# Patient Record
Sex: FEMALE | Race: WHITE | NOT HISPANIC OR LATINO | Employment: FULL TIME | URBAN - METROPOLITAN AREA
[De-identification: names, ages, dates, MRNs, and addresses within clinical notes are randomized per-mention and may not be internally consistent; named-entity substitution may affect disease eponyms.]

---

## 2017-05-30 ENCOUNTER — TRANSCRIBE ORDERS (OUTPATIENT)
Dept: LAB | Facility: CLINIC | Age: 72
End: 2017-05-30

## 2017-05-30 ENCOUNTER — GENERIC CONVERSION - ENCOUNTER (OUTPATIENT)
Dept: OTHER | Facility: OTHER | Age: 72
End: 2017-05-30

## 2017-05-30 ENCOUNTER — APPOINTMENT (OUTPATIENT)
Dept: LAB | Facility: CLINIC | Age: 72
End: 2017-05-30
Payer: MEDICARE

## 2017-05-30 DIAGNOSIS — F32.A VASCULAR DEMENTIA WITH DEPRESSED MOOD (HCC): ICD-10-CM

## 2017-05-30 DIAGNOSIS — I10 ESSENTIAL HYPERTENSION, MALIGNANT: ICD-10-CM

## 2017-05-30 DIAGNOSIS — E78.5 OTHER AND UNSPECIFIED HYPERLIPIDEMIA: ICD-10-CM

## 2017-05-30 DIAGNOSIS — I10 ESSENTIAL HYPERTENSION, MALIGNANT: Primary | ICD-10-CM

## 2017-05-30 DIAGNOSIS — E78.5 HYPERLIPIDEMIA: ICD-10-CM

## 2017-05-30 DIAGNOSIS — F32.9 MAJOR DEPRESSIVE DISORDER, SINGLE EPISODE: ICD-10-CM

## 2017-05-30 DIAGNOSIS — F01.50 VASCULAR DEMENTIA WITH DEPRESSED MOOD (HCC): ICD-10-CM

## 2017-05-30 DIAGNOSIS — E55.9 UNSPECIFIED VITAMIN D DEFICIENCY: ICD-10-CM

## 2017-05-30 DIAGNOSIS — E55.9 VITAMIN D DEFICIENCY: ICD-10-CM

## 2017-05-30 DIAGNOSIS — I10 ESSENTIAL (PRIMARY) HYPERTENSION: ICD-10-CM

## 2017-05-30 LAB
25(OH)D3 SERPL-MCNC: 24 NG/ML (ref 30–100)
ALBUMIN SERPL BCP-MCNC: 3.6 G/DL (ref 3.5–5)
ALP SERPL-CCNC: 44 U/L (ref 46–116)
ALT SERPL W P-5'-P-CCNC: 20 U/L (ref 12–78)
ANION GAP SERPL CALCULATED.3IONS-SCNC: 5 MMOL/L (ref 4–13)
AST SERPL W P-5'-P-CCNC: 20 U/L (ref 5–45)
BASOPHILS # BLD AUTO: 0.04 THOUSANDS/ΜL (ref 0–0.1)
BASOPHILS NFR BLD AUTO: 1 % (ref 0–1)
BILIRUB SERPL-MCNC: 0.4 MG/DL (ref 0.2–1)
BUN SERPL-MCNC: 16 MG/DL (ref 5–25)
CALCIUM SERPL-MCNC: 9 MG/DL (ref 8.3–10.1)
CHLORIDE SERPL-SCNC: 101 MMOL/L (ref 100–108)
CHOLEST SERPL-MCNC: 202 MG/DL (ref 50–200)
CO2 SERPL-SCNC: 32 MMOL/L (ref 21–32)
CREAT SERPL-MCNC: 0.79 MG/DL (ref 0.6–1.3)
EOSINOPHIL # BLD AUTO: 0.17 THOUSAND/ΜL (ref 0–0.61)
EOSINOPHIL NFR BLD AUTO: 3 % (ref 0–6)
ERYTHROCYTE [DISTWIDTH] IN BLOOD BY AUTOMATED COUNT: 12.4 % (ref 11.6–15.1)
GFR SERPL CREATININE-BSD FRML MDRD: >60 ML/MIN/1.73SQ M
GLUCOSE P FAST SERPL-MCNC: 96 MG/DL (ref 65–99)
HCT VFR BLD AUTO: 37.6 % (ref 34.8–46.1)
HDLC SERPL-MCNC: 64 MG/DL (ref 40–60)
HGB BLD-MCNC: 12.1 G/DL (ref 11.5–15.4)
LDLC SERPL CALC-MCNC: 118 MG/DL (ref 0–100)
LYMPHOCYTES # BLD AUTO: 1.85 THOUSANDS/ΜL (ref 0.6–4.47)
LYMPHOCYTES NFR BLD AUTO: 33 % (ref 14–44)
MCH RBC QN AUTO: 31.3 PG (ref 26.8–34.3)
MCHC RBC AUTO-ENTMCNC: 32.2 G/DL (ref 31.4–37.4)
MCV RBC AUTO: 97 FL (ref 82–98)
MONOCYTES # BLD AUTO: 0.49 THOUSAND/ΜL (ref 0.17–1.22)
MONOCYTES NFR BLD AUTO: 9 % (ref 4–12)
NEUTROPHILS # BLD AUTO: 3.09 THOUSANDS/ΜL (ref 1.85–7.62)
NEUTS SEG NFR BLD AUTO: 54 % (ref 43–75)
PLATELET # BLD AUTO: 256 THOUSANDS/UL (ref 149–390)
PMV BLD AUTO: 9.5 FL (ref 8.9–12.7)
POTASSIUM SERPL-SCNC: 4.1 MMOL/L (ref 3.5–5.3)
PROT SERPL-MCNC: 6.9 G/DL (ref 6.4–8.2)
RBC # BLD AUTO: 3.86 MILLION/UL (ref 3.81–5.12)
SODIUM SERPL-SCNC: 138 MMOL/L (ref 136–145)
TRIGL SERPL-MCNC: 98 MG/DL
TSH SERPL DL<=0.05 MIU/L-ACNC: 0.82 UIU/ML (ref 0.36–3.74)
WBC # BLD AUTO: 5.64 THOUSAND/UL (ref 4.31–10.16)

## 2017-05-30 PROCEDURE — 82306 VITAMIN D 25 HYDROXY: CPT

## 2017-05-30 PROCEDURE — 36415 COLL VENOUS BLD VENIPUNCTURE: CPT

## 2017-05-30 PROCEDURE — 80061 LIPID PANEL: CPT

## 2017-05-30 PROCEDURE — 84443 ASSAY THYROID STIM HORMONE: CPT

## 2017-05-30 PROCEDURE — 85025 COMPLETE CBC W/AUTO DIFF WBC: CPT

## 2017-05-30 PROCEDURE — 80053 COMPREHEN METABOLIC PANEL: CPT

## 2017-05-31 ENCOUNTER — GENERIC CONVERSION - ENCOUNTER (OUTPATIENT)
Dept: OTHER | Facility: OTHER | Age: 72
End: 2017-05-31

## 2017-06-05 ENCOUNTER — ALLSCRIPTS OFFICE VISIT (OUTPATIENT)
Dept: OTHER | Facility: OTHER | Age: 72
End: 2017-06-05

## 2017-07-06 ENCOUNTER — GENERIC CONVERSION - ENCOUNTER (OUTPATIENT)
Dept: OTHER | Facility: OTHER | Age: 72
End: 2017-07-06

## 2017-12-04 ENCOUNTER — TRANSCRIBE ORDERS (OUTPATIENT)
Dept: LAB | Facility: CLINIC | Age: 72
End: 2017-12-04

## 2017-12-04 ENCOUNTER — GENERIC CONVERSION - ENCOUNTER (OUTPATIENT)
Dept: OTHER | Facility: OTHER | Age: 72
End: 2017-12-04

## 2017-12-04 ENCOUNTER — APPOINTMENT (OUTPATIENT)
Dept: LAB | Facility: CLINIC | Age: 72
End: 2017-12-04
Payer: MEDICARE

## 2017-12-04 DIAGNOSIS — F32.A VASCULAR DEMENTIA WITH DEPRESSED MOOD (HCC): ICD-10-CM

## 2017-12-04 DIAGNOSIS — M15.9 GENERALIZED OSTEOARTHROSIS, INVOLVING MULTIPLE SITES: ICD-10-CM

## 2017-12-04 DIAGNOSIS — E55.9 AVITAMINOSIS D: ICD-10-CM

## 2017-12-04 DIAGNOSIS — F32.9 MAJOR DEPRESSIVE DISORDER, SINGLE EPISODE: ICD-10-CM

## 2017-12-04 DIAGNOSIS — M15.9 POLYOSTEOARTHRITIS: ICD-10-CM

## 2017-12-04 DIAGNOSIS — F01.50 VASCULAR DEMENTIA WITH DEPRESSED MOOD (HCC): ICD-10-CM

## 2017-12-04 DIAGNOSIS — I10 ESSENTIAL (PRIMARY) HYPERTENSION: ICD-10-CM

## 2017-12-04 DIAGNOSIS — E78.5 HYPERLIPIDEMIA: ICD-10-CM

## 2017-12-04 DIAGNOSIS — I10 ESSENTIAL HYPERTENSION, MALIGNANT: ICD-10-CM

## 2017-12-04 DIAGNOSIS — E55.9 VITAMIN D DEFICIENCY: ICD-10-CM

## 2017-12-04 DIAGNOSIS — E78.5 HYPERLIPIDEMIA, UNSPECIFIED HYPERLIPIDEMIA TYPE: ICD-10-CM

## 2017-12-04 DIAGNOSIS — F01.50 VASCULAR DEMENTIA WITH DEPRESSED MOOD (HCC): Primary | ICD-10-CM

## 2017-12-04 DIAGNOSIS — F32.A VASCULAR DEMENTIA WITH DEPRESSED MOOD (HCC): Primary | ICD-10-CM

## 2017-12-04 LAB
25(OH)D3 SERPL-MCNC: 31.5 NG/ML (ref 30–100)
ALBUMIN SERPL BCP-MCNC: 3.6 G/DL (ref 3.5–5)
ALP SERPL-CCNC: 45 U/L (ref 46–116)
ALT SERPL W P-5'-P-CCNC: 22 U/L (ref 12–78)
ANION GAP SERPL CALCULATED.3IONS-SCNC: 8 MMOL/L (ref 4–13)
AST SERPL W P-5'-P-CCNC: 17 U/L (ref 5–45)
BASOPHILS # BLD AUTO: 0.02 THOUSANDS/ΜL (ref 0–0.1)
BASOPHILS NFR BLD AUTO: 0 % (ref 0–1)
BILIRUB SERPL-MCNC: 0.5 MG/DL (ref 0.2–1)
BUN SERPL-MCNC: 18 MG/DL (ref 5–25)
CALCIUM SERPL-MCNC: 8.9 MG/DL (ref 8.3–10.1)
CHLORIDE SERPL-SCNC: 102 MMOL/L (ref 100–108)
CHOLEST SERPL-MCNC: 213 MG/DL (ref 50–200)
CO2 SERPL-SCNC: 30 MMOL/L (ref 21–32)
CREAT SERPL-MCNC: 0.93 MG/DL (ref 0.6–1.3)
EOSINOPHIL # BLD AUTO: 0.1 THOUSAND/ΜL (ref 0–0.61)
EOSINOPHIL NFR BLD AUTO: 1 % (ref 0–6)
ERYTHROCYTE [DISTWIDTH] IN BLOOD BY AUTOMATED COUNT: 12 % (ref 11.6–15.1)
GFR SERPL CREATININE-BSD FRML MDRD: 62 ML/MIN/1.73SQ M
GLUCOSE P FAST SERPL-MCNC: 89 MG/DL (ref 65–99)
HCT VFR BLD AUTO: 37.4 % (ref 34.8–46.1)
HDLC SERPL-MCNC: 71 MG/DL (ref 40–60)
HGB BLD-MCNC: 12.1 G/DL (ref 11.5–15.4)
LDLC SERPL CALC-MCNC: 131 MG/DL (ref 0–100)
LYMPHOCYTES # BLD AUTO: 2.15 THOUSANDS/ΜL (ref 0.6–4.47)
LYMPHOCYTES NFR BLD AUTO: 27 % (ref 14–44)
MCH RBC QN AUTO: 31.5 PG (ref 26.8–34.3)
MCHC RBC AUTO-ENTMCNC: 32.4 G/DL (ref 31.4–37.4)
MCV RBC AUTO: 97 FL (ref 82–98)
MONOCYTES # BLD AUTO: 0.58 THOUSAND/ΜL (ref 0.17–1.22)
MONOCYTES NFR BLD AUTO: 7 % (ref 4–12)
NEUTROPHILS # BLD AUTO: 5.22 THOUSANDS/ΜL (ref 1.85–7.62)
NEUTS SEG NFR BLD AUTO: 65 % (ref 43–75)
PLATELET # BLD AUTO: 251 THOUSANDS/UL (ref 149–390)
PMV BLD AUTO: 9.6 FL (ref 8.9–12.7)
POTASSIUM SERPL-SCNC: 3.8 MMOL/L (ref 3.5–5.3)
PROT SERPL-MCNC: 7.1 G/DL (ref 6.4–8.2)
RBC # BLD AUTO: 3.84 MILLION/UL (ref 3.81–5.12)
SODIUM SERPL-SCNC: 140 MMOL/L (ref 136–145)
TRIGL SERPL-MCNC: 55 MG/DL
TSH SERPL DL<=0.05 MIU/L-ACNC: 1.22 UIU/ML (ref 0.36–3.74)
WBC # BLD AUTO: 8.07 THOUSAND/UL (ref 4.31–10.16)

## 2017-12-04 PROCEDURE — 85025 COMPLETE CBC W/AUTO DIFF WBC: CPT

## 2017-12-04 PROCEDURE — 80061 LIPID PANEL: CPT

## 2017-12-04 PROCEDURE — 36415 COLL VENOUS BLD VENIPUNCTURE: CPT

## 2017-12-04 PROCEDURE — 80053 COMPREHEN METABOLIC PANEL: CPT

## 2017-12-04 PROCEDURE — 84443 ASSAY THYROID STIM HORMONE: CPT

## 2017-12-04 PROCEDURE — 82306 VITAMIN D 25 HYDROXY: CPT

## 2017-12-05 ENCOUNTER — GENERIC CONVERSION - ENCOUNTER (OUTPATIENT)
Dept: OTHER | Facility: OTHER | Age: 72
End: 2017-12-05

## 2017-12-05 DIAGNOSIS — M15.9 POLYOSTEOARTHRITIS: ICD-10-CM

## 2017-12-05 DIAGNOSIS — I10 ESSENTIAL (PRIMARY) HYPERTENSION: ICD-10-CM

## 2017-12-05 DIAGNOSIS — F32.9 MAJOR DEPRESSIVE DISORDER, SINGLE EPISODE: ICD-10-CM

## 2017-12-05 DIAGNOSIS — E78.5 HYPERLIPIDEMIA: ICD-10-CM

## 2017-12-05 DIAGNOSIS — E55.9 VITAMIN D DEFICIENCY: ICD-10-CM

## 2017-12-12 ENCOUNTER — ALLSCRIPTS OFFICE VISIT (OUTPATIENT)
Dept: OTHER | Facility: OTHER | Age: 72
End: 2017-12-12

## 2018-01-10 NOTE — RESULT NOTES
Verified Results  (1) VITAMIN D 25-HYDROXY [id number] 09:01AM Gamaliel Figueredo     Test Name Result Flag Reference   VIT D 25-HYDROX 27 7 ng/mL L 30 0-100 0   This assay is a certified procedure of the CDC Vitamin D Standardization Certification Program (VDSCP)     Deficiency <20ng/ml   Insufficiency 20-30ng/ml   Sufficient  ng/ml     *Patients undergoing fluorescein dye angiography may retain small amounts of fluorescein in the body for 48-72 hours post procedure  Samples containing fluorescein can produce falsely elevated Vitamin D values  If the patient had this procedure, a specimen should be resubmitted post fluorescein clearance

## 2018-01-10 NOTE — RESULT NOTES
Verified Results  (1) VITAMIN D 25-HYDROXY [id number] 10:30AM Shama Ramirez     Test Name Result Flag Reference   VIT D 25-HYDROX 24 0 ng/mL L 30 0-100 0   This assay is a certified procedure of the CDC Vitamin D Standardization Certification Program (VDSCP)     Deficiency <20ng/ml   Insufficiency 20-30ng/ml   Sufficient  ng/ml     *Patients undergoing fluorescein dye angiography may retain small amounts of fluorescein in the body for 48-72 hours post procedure  Samples containing fluorescein can produce falsely elevated Vitamin D values  If the patient had this procedure, a specimen should be resubmitted post fluorescein clearance

## 2018-01-11 NOTE — RESULT NOTES
Verified Results  (1) CBC/PLT/DIFF 31Oct2016 09:01AM Nathen Russell Order Number: [id number]     Test Name Result Flag Reference   WBC COUNT 6 04 Thousand/uL  4 31-10 16   RBC COUNT 3 90 Million/uL  3 81-5 12   HEMOGLOBIN 12 3 g/dL  11 5-15 4   HEMATOCRIT 37 4 %  34 8-46  1   MCV 96 fL  82-98   MCH 31 5 pg  26 8-34 3   MCHC 32 9 g/dL  31 4-37 4   RDW 12 3 %  11 6-15 1   MPV 9 5 fL  8 9-12 7   PLATELET COUNT 261 Thousands/uL  149-390   NEUTROPHILS RELATIVE PERCENT 50 %  43-75   LYMPHOCYTES RELATIVE PERCENT 37 %  14-44   MONOCYTES RELATIVE PERCENT 10 %  4-12   EOSINOPHILS RELATIVE PERCENT 2 %  0-6   BASOPHILS RELATIVE PERCENT 1 %  0-1   NEUTROPHILS ABSOLUTE COUNT 3 05 Thousands/?L  1 85-7 62   LYMPHOCYTES ABSOLUTE COUNT 2 24 Thousands/?L  0 60-4 47   MONOCYTES ABSOLUTE COUNT 0 61 Thousand/?L  0 17-1 22   EOSINOPHILS ABSOLUTE COUNT 0 11 Thousand/?L  0 00-0 61   BASOPHILS ABSOLUTE COUNT 0 03 Thousands/?L  0 00-0 10     (1) COMPREHENSIVE METABOLIC PANEL [id number] 08:86CW [place] [facility]    Order Number: [id number]     Test Name Result Flag Reference   GLUCOSE,RANDM 97 mg/dL     If the patient is fasting, the ADA then defines impaired fasting glucose as > 100 mg/dL and diabetes as > or equal to 123 mg/dL     SODIUM 141 mmol/L  136-145   POTASSIUM 4 0 mmol/L  3 5-5 3   CHLORIDE 103 mmol/L  100-108   CARBON DIOXIDE 31 mmol/L  21-32   ANION GAP (CALC) 7 mmol/L  4-13   BLOOD UREA NITROGEN 17 mg/dL  5-25   CREATININE 0 77 mg/dL  0 60-1 30   Standardized to IDMS reference method   CALCIUM 9 1 mg/dL  8 3-10 1   BILI, TOTAL 0 50 mg/dL  0 20-1 00   ALK PHOSPHATAS 56 U/L     ALT (SGPT) 14 U/L  12-78   AST(SGOT) 17 U/L  5-45   ALBUMIN 3 7 g/dL  3 5-5 0   TOTAL PROTEIN 7 3 g/dL  6 4-8 2   eGFR Non-African American      >60 0 ml/min/1 73sq m   [employer] Disease Education Program recommendations are as follows:  GFR calculation is accurate only with a steady state creatinine  Chronic Kidney disease less than 60 ml/min/1 73 sq  meters  Kidney failure less than 15 ml/min/1 73 sq  meters  (1) LIPID PANEL, FASTING 31Oct2016 09:01AM Daisha Ashlyn Order Number: [id number]     Test Name Result Flag Reference   CHOLESTEROL 230 mg/dL H    HDL,DIRECT 61 mg/dL H 40-60   Specimen collection should occur prior to Metamizole administration due to the potential for falsely depressed results  LDL CHOLESTEROL CALCULATED 154 mg/dL H 0-100   Triglyceride:         Normal              <150 mg/dl       Borderline High    150-199 mg/dl       High               200-499 mg/dl       Very High          >499 mg/dl  Cholesterol:         Desirable        <200 mg/dl      Borderline High  200-239 mg/dl      High             >239 mg/dl  HDL Cholesterol:        High    >59 mg/dL      Low     <41 mg/dL  LDL CALCULATED:    This screening LDL is a calculated result  It does not have the accuracy of the Direct Measured LDL in the monitoring of patients with hyperlipidemia and/or statin therapy  Direct Measure LDL (MXK408) must be ordered separately in these patients  TRIGLYCERIDES 77 mg/dL  <=150   Specimen collection should occur prior to N-Acetylcysteine or Metamizole administration due to the potential for falsely depressed results  (1) TSH WITH FT4 REFLEX 31Oct2016 09:01AM Daisha Ashlyn Order Number: [id number]     Test Name Result Flag Reference   TSH 1 419 uIU/mL  0 358-3 740   Patients undergoing fluorescein dye angiography may retain small amounts of fluorescein in the body for 48-72 hours post procedure  Samples containing fluorescein can produce falsely depressed TSH values  If the patient had this procedure,a specimen should be resubmitted post fluorescein clearance            The recommended reference ranges for TSH during pregnancy are as follows:  First trimester 0 1 to 2 5 uIU/mL  Second trimester  0 2 to 3 0 uIU/mL  Third trimester 0 3 to 3 0 uIU/m

## 2018-01-12 VITALS
RESPIRATION RATE: 16 BRPM | SYSTOLIC BLOOD PRESSURE: 122 MMHG | HEART RATE: 68 BPM | HEIGHT: 66 IN | DIASTOLIC BLOOD PRESSURE: 64 MMHG | WEIGHT: 156 LBS | BODY MASS INDEX: 25.07 KG/M2

## 2018-01-12 NOTE — RESULT NOTES
Verified Results  (1) CBC/PLT/DIFF [id number] 10:30AM [facility] Order Number: [id number]     Test Name Result Flag Reference   WBC COUNT 5 64 Thousand/uL  4 31-10 16   RBC COUNT 3 86 Million/uL  3 81-5 12   HEMOGLOBIN 12 1 g/dL  11 5-15 4   HEMATOCRIT 37 6 %  34 8-46  1   MCV 97 fL  82-98   MCH 31 3 pg  26 8-34 3   MCHC 32 2 g/dL  31 4-37 4   RDW 12 4 %  11 6-15 1   MPV 9 5 fL  8 9-12 7   PLATELET COUNT 711 Thousands/uL  149-390   NEUTROPHILS RELATIVE PERCENT 54 %  43-75   LYMPHOCYTES RELATIVE PERCENT 33 %  14-44   MONOCYTES RELATIVE PERCENT 9 %  4-12   EOSINOPHILS RELATIVE PERCENT 3 %  0-6   BASOPHILS RELATIVE PERCENT 1 %  0-1   NEUTROPHILS ABSOLUTE COUNT 3 09 Thousands/? ??L  1 85-7 62   LYMPHOCYTES ABSOLUTE COUNT 1 85 Thousands/? ??L  0 60-4 47   MONOCYTES ABSOLUTE COUNT 0 49 Thousand/? ??L  0 17-1 22   EOSINOPHILS ABSOLUTE COUNT 0 17 Thousand/? ??L  0 00-0 61   BASOPHILS ABSOLUTE COUNT 0 04 Thousands/? ??L  0 00-0 10   - Patient Instructions: This bloodwork is non-fasting  Please drink two glasses of water morning of bloodwork  (1) COMPREHENSIVE METABOLIC PANEL [id number] 80:13MQ [facility] Order Number: [id number]     Test Name Result Flag Reference   SODIUM 138 mmol/L  136-145   POTASSIUM 4 1 mmol/L  3 5-5 3   CHLORIDE 101 mmol/L  100-108   CARBON DIOXIDE 32 mmol/L  21-32   ANION GAP (CALC) 5 mmol/L  4-13   BLOOD UREA NITROGEN 16 mg/dL  5-25   CREATININE 0 79 mg/dL  0 60-1 30   Standardized to IDMS reference method   CALCIUM 9 0 mg/dL  8 3-10 1   BILI, TOTAL 0 40 mg/dL  0 20-1 00   ALK PHOSPHATAS 44 U/L L    ALT (SGPT) 20 U/L  12-78   AST(SGOT) 20 U/L  5-45   ALBUMIN 3 6 g/dL  3 5-5 0   TOTAL PROTEIN 6 9 g/dL  6 4-8 2   eGFR Non-African American      >60 0 ml/min/1 73sq [facility] Disease Education Program recommendations are as follows:  GFR calculation is accurate only with a steady state creatinine  Chronic Kidney disease less than 60 ml/min/1 73 sq  meters  Kidney failure less than 15 ml/min/1 73 sq  meters  GLUCOSE FASTING 96 mg/dL  65-99     (1) LIPID PANEL, FASTING [id number] 10:30AM Marny Tony Order Number: [id number]     Test Name Result Flag Reference   CHOLESTEROL 202 mg/dL H    HDL,DIRECT 64 mg/dL H 40-60   Specimen collection should occur prior to Metamizole administration due to the potential for falsely depressed results  LDL CHOLESTEROL CALCULATED 118 mg/dL H 0-100   - Patient Instructions: This is a fasting blood test  Water,black tea or black  coffee only after 9:00pm the night before test   Drink 2 glasses of water the morning of test       Triglyceride:         Normal              <150 mg/dl       Borderline High    150-199 mg/dl       High               200-499 mg/dl       Very High          >499 mg/dl  Cholesterol:         Desirable        <200 mg/dl      Borderline High  200-239 mg/dl      High             >239 mg/dl  HDL Cholesterol:        High    >59 mg/dL      Low     <41 mg/dL  LDL CALCULATED:    This screening LDL is a calculated result  It does not have the accuracy of the Direct Measured LDL in the monitoring of patients with hyperlipidemia and/or statin therapy  Direct Measure LDL ([id number]) must be ordered separately in these patients  TRIGLYCERIDES 98 mg/dL  <=150   Specimen collection should occur prior to N-Acetylcysteine or Metamizole administration due to the potential for falsely depressed results  (1) TSH WITH FT4 REFLEX [id number] 10:30AM Harpal Jimenez Order Number: [id number]     Test Name Result Flag Reference   TSH 0 819 uIU/mL  0 358-3 740   Patients undergoing fluorescein dye angiography may retain small amounts of fluorescein in the body for 48-72 hours post procedure  Samples containing fluorescein can produce falsely depressed TSH values  If the patient had this procedure,a specimen should be resubmitted post fluorescein clearance            The recommended reference ranges for TSH during pregnancy are as follows:  First trimester 0 1 to 2 5 uIU/mL  Second trimester  0 2 to 3 0 uIU/mL  Third trimester 0 3 to 3 0 uIU/m

## 2018-01-15 NOTE — RESULT NOTES
Verified Results  (1) COMPREHENSIVE METABOLIC PANEL [id number] 93:64DS [facility]   [facility] Kidney Disease Education Program recommendations are as follows:  GFR calculation is accurate only with a steady state creatinine  Chronic Kidney disease less than 60 ml/min/1 73 sq  meters  Kidney failure less than 15 ml/min/1 73 sq  meters  Test Name Result Flag Reference   GLUCOSE,RANDM 90 mg/dL     If the patient is fasting, the ADA then defines impaired fasting glucose as > 100 mg/dL and diabetes as > or equal to 123 mg/dL  SODIUM 139 mmol/L  136-145   POTASSIUM 4 0 mmol/L  3 5-5 3   CHLORIDE 101 mmol/L  100-108   CARBON DIOXIDE 31 mmol/L  21-32   ANION GAP (CALC) 7 mmol/L  4-13   BLOOD UREA NITROGEN 16 mg/dL  5-25   CREATININE 0 90 mg/dL  0 60-1 30   Standardized to IDMS reference method   CALCIUM 8 8 mg/dL  8 3-10 1   BILI, TOTAL 0 40 mg/dL  0 20-1 00   ALK PHOSPHATAS 54 U/L     ALT (SGPT) 11 U/L L 12-78   AST(SGOT) 14 U/L  5-45   ALBUMIN 3 7 g/dL  3 5-5 0   TOTAL PROTEIN 7 1 g/dL  6 4-8 2   eGFR Non-African American      >60 0 ml/min/1 73sq m     (1) LIPID PANEL, FASTING 25Apr2016 10:34AM Neelima Love   Triglyceride:         Normal              <150 mg/dl       Borderline High    150-199 mg/dl       High               200-499 mg/dl       Very High          >499 mg/dl  Cholesterol:         Desirable        <200 mg/dl      Borderline High  200-239 mg/dl      High             >239 mg/dl  HDL Cholesterol:        High    >59 mg/dL      Low     <41 mg/dL  LDL CALCULATED:    This screening LDL is a calculated result  It does not have the accuracy of the Direct Measured LDL in the monitoring of patients with hyperlipidemia and/or statin therapy  Direct Measure LDL (DVF924) must be ordered separately in these patients       Test Name Result Flag Reference   CHOLESTEROL 218 mg/dL H    HDL,DIRECT 62 mg/dL H 40-60   Specimen collection should occur prior to Metamizole administration due to the potential for falsely depressed results  LDL CHOLESTEROL CALCULATED 140 mg/dL H 0-100   TRIGLYCERIDES 80 mg/dL  <=150   Specimen collection should occur prior to N-Acetylcysteine or Metamizole administration due to the potential for falsely depressed results

## 2018-01-15 NOTE — CONSULTS
Assessment    1  History of colon polyps (V12 72) (Z86 010)    Plan  History of colon polyps    · Dulcolax 5 MG Oral Tablet Delayed Release; TAKE 2 TABLET ONCE   Rx By: Daisy Moncada; Dispense: 1 Days ; #:2 Tablet Delayed Release; Refill: 0; For: History of colon polyps; SHERRON = N; Sent To: [facility]   · Golytely 227 1 GM Oral Solution Reconstituted; TAKE AS DIRECTED   Rx By: Daisy Moncada; Dispense: 0 Days ; #:1 Solution Reconstituted; Refill: 0; For: History of colon polyps; SHERRON = N; Sent To: [facility]   · COLONOSCOPY; Status:Active; Requested YPT:[id number];    Perform:[facility]; [facility]:[id number];SLOAN;  For:History of colon polyps; Ordered By:Anna Valencia; Discussion/Summary  Discussion Summary:     ASSESSMENT:    #1  Personal history of colon polyps- patient is at increased risk for colon cancer screening  Rule out colorectal lesions including polyps or malignancy  #2  Hypertension, hyperlipidemia    PLAN:    #1  Schedule for colonoscopy  #2  High-fiber diet  #3  Patient was given instructions about the colonoscopy prep  #4  Patient was explained about the risks and benefits of the procedure  Risks including but not limited to bleeding, infection, perforation were explained in detail  Also explained about less than 100% sensitivity with the exam and other alternatives  Understands and agrees with treatment plan: The treatment plan was reviewed with the patient/guardian  The patient/guardian understands and agrees with the treatment plan   Counseling Documentation With Imm: The patient was counseled regarding instructions for management, risk factor reductions, patient and family education, risks and benefits of treatment options, importance of compliance with treatment        Chief Complaint  Chief Complaint Free Text Note Form:   Screening for colon cancer      History of Present Illness  HPI:   77-year-old female with history of hypertension was referred for colonoscopy  Patient had colonoscopy in 1998 and was told about a polyp that was removed  She never went back for repeat colonoscopy after that  Patient has regular bowel movements and denies any blood or mucus in the stool  Appetite is good and denies any recent weight loss  Denies any abdominal pain, nausea, or vomiting  Has no heartburn or acid reflux  Denies any difficulty swallowing  History Reviewed: The history was obtained today from the patient and I agree with the documented history  Review of Systems  Complete-Female GI Adult:   Constitutional: No fever, no chills, feels well, no tiredness, no recent weight gain or weight loss  Eyes: No complaints of eye pain, no red eyes, no eyesight problems, no discharge, no dry eyes, no itching of eyes  ENT: no complaints of earache, no loss of hearing, no nose bleeds, no nasal discharge, no sore throat, no hoarseness  Cardiovascular: No complaints of slow heart rate, no fast heart rate, no chest pain, no palpitations, no leg claudication, no lower extremity edema  Respiratory: No complaints of shortness of breath, no wheezing, no cough, no SOB on exertion, no orthopnea, no PND  Gastrointestinal: as noted in HPI  Genitourinary: No complaints of dysuria, no incontinence, no pelvic pain, no dysmenorrhea, no vaginal discharge or bleeding  Musculoskeletal: No complaints of arthralgias, no myalgias, no joint swelling or stiffness, no limb pain or swelling  Integumentary: No complaints of skin rash or lesions, no itching, no skin wounds, no breast pain or lump  Neurological: No complaints of headache, no confusion, no convulsions, no numbness, no dizziness or fainting, no tingling, no limb weakness, no difficulty walking  Psychiatric: Not suicidal, no sleep disturbance, no anxiety or depression, no change in personality, no emotional problems     Endocrine: No complaints of proptosis, no hot flashes, no muscle weakness, no deepening of the voice, no feelings of weakness  Hematologic/Lymphatic: No complaints of swollen glands, no swollen glands in the neck, does not bleed easily, does not bruise easily  Active Problems    1  Abnormal loss of weight (783 21) (R63 4)   2  Back strain (847 9) (S39 012A)   3  Benign essential hypertension (401 1) (I10)   4  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)   5  Colon cancer screening (V76 51) (Z12 11)   6  Depression (311) (F32 9)   7  Disc degeneration, lumbar (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 52) (M51 36)   8  Dizziness (780 4) (R42)   9  Dyspnea on exertion (786 09) (R06 09)   10  Encounter for screening mammogram for breast cancer (V76 12) (Z12 31)   11  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)   12  Generalized osteoarthritis (715 00) (M15 9)   13  Hyperlipidemia (272 4) (E78 5)   14  Insomnia (780 52) (G47 00)   15  Knee pain, right (719 46) (M25 561)   16  Medicare annual wellness visit, subsequent (V70 0) (Z00 00)   17  Menopause (627 2)   18  Muscle ache (729 1) (M79 1)   19  Need for prophylactic vaccination and inoculation against influenza (V04 81) (Z23)   20  Peripheral neuropathy (356 9) (G62 9)   21  Polyp of sigmoid colon (211 3) (D12 5)   22  Primary osteoarthritis of right knee (715 16) (M17 11)   23  Ptosis, left eyelid (374 30) (H02 402)   24  Right knee pain (719 46) (M25 561)   25  Strain of knee and leg, left (844 9) (S86 912A)   26  Toxicity From Tricyclic Antidepressants (969 05)   27  Vertigo (780 4) (R42)    Past Medical History    1  History of Hip pain, unspecified laterality   2  History of Lumbago (724 2) (M54 5)   3  History of Need for chickenpox vaccination (V05 4) (Z23)   4  History of Shoulder joint pain, unspecified laterality (719 41) (M25 519)   5  History of Well adult on routine health check (V70 0) (Z00 00)  Active Problems And Past Medical History Reviewed: The active problems and past medical history were reviewed and updated today        Surgical History  Surgical History Reviewed: The surgical history was reviewed and updated today  Family History    1  Family history of cardiac disorder (V17 49) (Z82 49)  Family History Reviewed: The family history was reviewed and updated today  Social History    · Alcohol Use (History)   · Caffeine Use   · Marital History - Currently    · Never A Smoker  Social History Reviewed: The social history was reviewed and updated today  The social history was reviewed and is unchanged  Current Meds   1  Aspirin 81 MG Oral Tablet; Take 1 tablet daily Recorded   2  Hydrocodone-Acetaminophen 5-325 MG Oral Tablet; take 1 tablet every 8 hours as   needed for pain; Therapy: 16SFK2578 to (Evaluate:07Jun2015); Last Rx:02Jun2015 Ordered   3  Ibuprofen 600 MG Oral Tablet; take one tablet every 8 hours as needed for pain; Therapy: 44FZR4880 to (Evaluate:25Jun2015)  Requested for: 34GPC7379; Last   Rx:15Jun2015 Ordered   4  Lisinopril-Hydrochlorothiazide 20-25 MG Oral Tablet; 1 every day  Requested for:   57YGI1884; Last Rx:26Oct2015 Ordered  Medication List Reviewed: The medication list was reviewed and updated today  Allergies    1  No Known Drug Allergies    Vitals  Vital Signs [Data Includes: Current Encounter]    Recorded: 21Jan2016 01:52PM   Heart Rate 70   Systolic 279, LUE, Sitting   Diastolic 66, LUE, Sitting   BP Comments manual b/p   Height 5 ft 6 in   Weight 160 lb 6 oz   BMI Calculated 25 89   BSA Calculated 1 82     Physical Exam    Constitutional   General appearance: No acute distress, well appearing and well nourished  Eyes   Conjunctiva and lids: No swelling, erythema or discharge  Pupils and irises: Equal, round and reactive to light  Ears, Nose, Mouth, and Throat   External inspection of ears and nose: Normal     Oropharynx: Normal with no erythema, edema, exudate or lesions  Pulmonary   Respiratory effort: No increased work of breathing or signs of respiratory distress      Auscultation of lungs: Clear to auscultation  Cardiovascular   Palpation of heart: Normal PMI, no thrills  Auscultation of heart: Normal rate and rhythm, normal S1 and S2, without murmurs  Examination of extremities for edema and/or varicosities: Normal     Carotid pulses: Normal     Abdomen   Abdomen: Non-tender, no masses  Liver and spleen: No hepatomegaly or splenomegaly  Lymphatic   Palpation of lymph nodes in neck: No lymphadenopathy  Musculoskeletal   Gait and station: Normal     Digits and nails: Normal without clubbing or cyanosis  Inspection/palpation of joints, bones, and muscles: Normal     Skin   Skin and subcutaneous tissue: Normal without rashes or lesions  Psychiatric   Orientation to person, place, and time: Normal     Mood and affect: Normal          Future Appointments    Date/Time Provider Specialty Site   05/02/2016 10:40 AM Akila Luciano DO Family Medicine [facility]   04/11/2016 10:00 AM GORDO Coreas   Gastroenterology Adult [address]     Signatures   Electronically signed by : GORDO Blankenship ; Jan 21 2016  2:07PM EST                       (Author)

## 2018-01-16 NOTE — PROGRESS NOTES
Assessment    1  Benign essential hypertension (401 1) (I10)   2  Depression (311) (F32 9)   3  Hyperlipidemia (272 4) (E78 5)   4  Vitamin D deficiency (268 9) (E55 9)   5  Seasonal allergies (477 9) (J30 2)   6  Medicare annual wellness visit, subsequent (V70 0) (Z00 00)   7  Encounter for screening mammogram for breast cancer (V76 12) (Z12 31)   8  Incurved toenail (703 0) (L60 0)   9  Onychomycosis of toenail (110 1) (B35 1)    Plan   Benign essential hypertension    · Lisinopril-Hydrochlorothiazide 20-25 MG Oral Tablet; 1 every day  Benign essential hypertension, Depression, Hyperlipidemia, Vitamin D deficiency    · (1) CBC/PLT/DIFF; Status:Active; Requested BDW:[id number];    · (1) COMPREHENSIVE METABOLIC PANEL; Status:Active; Requested MAGDI:[id number];    · (1) LIPID PANEL, FASTING; Status:Active; Requested HNO:[id number];    · (1) TSH WITH FT4 REFLEX; Status:Active; Requested JXV:[id number];    · *(Q) VITAMIN D, 25-HYDROXY, LC/MS/MS; Status:Active; Requested RKK:[id number];   Medicare annual wellness visit, subsequent    · Zostavax 36326 UNT/0 65ML Subcutaneous Solution Reconstituted (Zostavax  88333 UNT/0 65ML Subcutaneous Solution Reconstituted); INJECT 0 65 ML Once  Need for prophylactic vaccination and inoculation against influenza    · Fluzone High-Dose 0 5 ML Intramuscular Suspension Prefilled Syringe;  INJECT 0 5  ML Intramuscular; To Be Done: [id number]   · Fluzone High-Dose 0 5 ML Intramuscular Suspension Prefilled Syringe  Seasonal allergies    · Montelukast Sodium 10 MG Oral Tablet; 1 every day  Vitamin D deficiency    · Vitamin D (Ergocalciferol) 22184 UNIT Oral Capsule; TAKE 1 CAPSULE EVERY 2  WEEKS      1 - Wale Tolliver MD, Pee PICKARD (Gastroenterology) Physician Referral  Consult  Status: Active  Requested for: 74DCD1172  Ordered;    For: Colon cancer screening;  Ordered By: Tiffani Casarez  Performed:   Due: 20FNO2898   * MAMMO SCREENING BILATERAL W CAD; Status:Hold For - Scheduling; Requested for:07Ueb9683; Perform:[facility]; XVZ[facility][id number];[facility];    [facility] for screening mammogram for breast cancer, Medicare annual wellness visit, subsequent; Ordered By:Alvaro Guzman;   2 - Marylou Whiting DPM, Kathy Lee  (Podiatry) Physician Referral  Consult  Status: Hold For - Scheduling  Requested for: 71DRK7416  Ordered; For: Incurved toenail, Onychomycosis of toenail;  Ordered By: Radha Lazo  Performed:   Due: 40VDK4600     Discussion/Summary    - Hypertension is well-controlled  She will continue on current dose of lisinopril/hydrochlorothiazide for hypertension   -The patient's symptoms of dizziness are likely due to environmental allergies  She did have carotid Dopplers done last year which were completely normal as well as stress testing which was also unremarkable  We'll place the patient on Singulair 10 mg once daily which should help with her allergy symptoms  - High-dose Flu vaccine provided in the office  -Patient again given an order for screening mammogram  - Patient is noted to be vitamin D deficient  She was given a prescription for vitamin D supplementation 50,000 international units once every 2 weeks  If she is unable to afford this then she can simply buy over-the-counter vitamin D supplement with a recommended dosage of 2000 international units daily   -Patient will follow-up in 6 months with repeat lipid profile, vitamin D level  - Patient given referral to podiatrist for nail care  Impression: Subsequent Annual Wellness Visit, with preventive exam as well as age and risk appropriate counseling completed  Cardiovascular screening and counseling: screening is current  Diabetes screening and counseling: screening is current  Breast cancer screening and counseling: the risks and benefits of screening were discussed and due for a screening mammogram    Cervical cancer screening and counseling: the risks and benefits of screening were discussed and screening not indicated        Chief Complaint  AWV      History of Present Illness  HPI: Patient presents for subsequent annual wellness visit and followup of chronic conditions including hypertension, of her lipidemia, generalized osteoarthritis, dizziness and vertigo  Patient does complain of sneezing, nasal congestion  No fevers or chills  Patient never had screening mammogram performed which was ordered last year  She would like to receive flu vaccination  Hypertension remains well controlled  Patient works 13 hours a day 5 days a week  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  Patient also complains of discoloration of her great toenail as well as difficulty cutting incurved toenails  She would like referral to podiatrist    Welcome to Medicare and Wellness Visits: The patient is being seen for the subsequent annual wellness visit  Medicare Screening and Risk Factors   Hospitalizations: no previous hospitalizations  Medicare Screening Tests Risk Questions   Abdominal aortic aneurysm risk assessment: none indicated  Osteoporosis risk assessment: , female gender and over 48years of age  HIV risk assessment: none indicated  Drug and Alcohol Use: The patient has never smoked cigarettes and has never used smokeless tobacco  The patient reports rare alcohol use  Alcohol concern:   The patient has no concerns about alcohol abuse  She has never used illicit drugs  Diet and Physical Activity: Current diet includes well balanced meals, 1 servings of fruit per day, 1-2 servings of vegetables per day, 1-2 servings of meat per day, 1-2 servings of whole grains per day, 1-2 servings of simple carbohydrates per day, 1-2 servings of dairy products per day, 1-2 cups of coffee per day, 0 cans of regular soda per day and 0 cans of diet soda per day  She exercises infrequently  Exercise: walking, bilke riding     Mood Disorder and Cognitive Impairment Screening: She denies feeling down, depressed, or hopeless over the past two weeks  She denies feeling little interest or pleasure in doing things over the past two weeks  Cognitive impairment screening: denies difficulty learning/retaining new information, denies difficulty handling complex tasks, denies difficulty with reasoning, denies difficulty with spatial ability and orientation, denies difficulty with language and denies difficulty with behavior  Functional Ability/Level of Safety: Hearing is slightly decreased and a hearing aid is not used  The patient is currently able to do activities of daily living without limitations, able to do instrumental activities of daily living without limitations and able to participate in social activities without limitations  Activities of daily living details: does not need help using the phone, no transportation help needed, does not need help shopping, no meal preparation help needed, does not need help doing laundry, does not need help managing medications and does not need help managing money  Fall risk factors:  no polypharmacy, no alcohol use, no mobility impairment, no antidepressant use, no deconditioning, no postural hypotension, no sedative use, no visual impairment, no urinary incontinence, no antihypertensive use, no cognitive impairment and up and go test was normal  Home safety risk factors:  no unfamiliar surroundings, no loose rugs, no poor household lighting, no uneven floors, no household clutter, grab bars in the bathroom and handrails on the stairs  Advance Directives: Advance directives: living will, durable power of  for health care directives and advance directives  Co-Managers and Medical Equipment/Suppliers: See Patient Care Team   Reviewed Updated [employer]:   Last Medicare Wellness Visit Information was reviewed, patient interviewed, no change since last [facility]  Preventive Quality Program 65 and Older: Falls Risk: The patient fell none times in the past 12 months   The patient is currently asymptomatic Symptoms Include:  Associated symptoms:  No associated symptoms are reported no pain from the injury  The patient currently has no urinary incontinence symptoms  Patient Care Team    Care Team Member Role Specialty Office Number   Gegenelson Mian NGUYEN  Orthopedic Surgery [phone]   Jake NGUYEN  Gastroenterology Adult [phone]     Review of Systems    Constitutional: negative  Eyes: negative  ENT: negative  Cardiovascular: negative  Respiratory: negative  Gastrointestinal: negative  Genitourinary: negative  Musculoskeletal: negative  Integumentary and Breasts: negative  Neurological: negative  Psychiatric: negative  Endocrine: negative  Hematologic and Lymphatic: negative  Over the past 2 weeks, how often have you been bothered by the following problems? 1 ) Little interest or pleasure in doing things? Not at all    2 ) Feeling down, depressed or hopeless? Not at all    3 ) Trouble falling asleep or sleeping too much? Not at all    4 ) Feeling tired or having little energy? Not at all    5 ) Poor appetite or overeating? Not at all    6 ) Feeling bad about yourself, or that you are a failure, or have let yourself or your family down? Not at all    7 ) Trouble concentrating on things, such as reading a newspaper or watching television? Not at all    8 ) Moving or speaking so slowly that other people could have noticed, or the opposite, moving or speaking faster than usual? Not at all  How difficult have these problems made it for you to do your work, take care of things at home, or get along with people? 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)   9  Generalized osteoarthritis (715 00) (M15 9)   10  History of colon polyps (V12 72) (Z86 010)   11  Hyperlipidemia (272 4) (E78 5)   12  Insomnia (780 52) (G47 00)   13  Medicare annual wellness visit, subsequent (V70 0) (Z00 00)   14  Menopause (627 2)   15  Need for pneumococcal vaccination (V03 82) (Z23)   16  Peripheral neuropathy (356 9) (G62 9)   17  Polyp of sigmoid colon (211 3) (D12 5)   18  Primary osteoarthritis of right knee (715 16) (M17 11)   19  Ptosis, left eyelid (374 30) (H02 402)   20  Vertigo (780 4) (R42)    Past Medical History    · History of Colon cancer screening (V76 51) (Z12 11)   · History of Hip pain, unspecified laterality   · History of dizziness (V13 89) (K28 726)   · History of strain of back (V13 59) (Z87 39)   · History of Knee pain, right (719 46) (M25 561)   · History of Lumbago (724 2) (M54 5)   · History of Muscle ache (729 1) (M79 1)   · History of Need for chickenpox vaccination (V05 4) (Z23)   · History of Right knee pain (719 46) (M25 561)   · History of Shoulder joint pain, unspecified laterality   · History of Strain of knee and leg, left (844 9) (G39 022S)   · History of Toxicity From Tricyclic Antidepressants (969 05)   · History of Well adult on routine health check (V70 0) (Z00 00)    The active problems and past medical history were reviewed and updated today        Family History  Mother    · Denied: Family history of Colon cancer   · Denied: Family history of Crohn's disease without complication, unspecified  gastrointestinal tract location   · Denied: Family history of liver disease  Father    · Denied: Family history of Colon cancer   · Denied: Family history of Crohn's disease without complication, unspecified  gastrointestinal tract location   · Denied: Family history of liver disease  Grandparent    · Family history of cardiac disorder (V17 49) (Z82 49)    The family history was reviewed and updated today  Social History    · Alcohol Use (History)   · Rarely   · Caffeine Use   · Marital History - Currently    · Never A Smoker  The social history was reviewed and updated today  The social history was reviewed and is unchanged  Current Meds   1  Aspirin 81 MG TABS; Take 1 tablet daily Recorded   2  Ibuprofen 600 MG Oral Tablet; take one tablet every 8 hours as needed for pain; Therapy: 68POT0631 to (Evaluate:82Urc2379)  Requested for: 88MHH6274; Last   Rx:[id number] Ordered   3  Lisinopril-Hydrochlorothiazide 20-25 MG Oral Tablet; 1 every day  Requested for:   61MKN3296; Last Rx:[id number] Ordered    The medication list was reviewed and updated today  Allergies    1  No Known Drug Allergies    Immunizations   1 2 3    Influenza  24-Sep-2013 08-Oct-2014 26-Oct-2015    PCV  09-May-2011 02-May-2016      Vitals  Signs    Temperature: 98 2 F  Heart Rate: 64  Respiration: 16  Systolic: 093  Diastolic: 68  Height: 5 ft 6 in  Weight: 161 lb 6 08 oz  BMI Calculated: 26 05  BSA Calculated: 1 83  O2 Saturation: 99    Physical Exam    Constitutional   General appearance: No acute distress, well appearing and well nourished  Eyes   Conjunctiva and lids: No swelling, erythema or discharge  Pupils and irises: Equal, round, reactive to light  Ophthalmoscopic examination: Normal fundi and optic discs  Ears, Nose, Mouth, and Throat   External inspection of ears and nose: Normal     Otoscopic examination: Tympanic membranes translucent with normal light reflex  Canals patent without erythema  Hearing: Normal     Nasal mucosa, septum, and turbinates: Normal without edema or erythema  Lips, teeth, and gums: Normal, good dentition  Oropharynx: Normal with no erythema, edema, exudate or lesions  Neck   Neck: Supple, symmetric, trachea midline, no masses  Thyroid: Normal, no thyromegaly      Pulmonary   Respiratory effort: No increased work of breathing or signs of respiratory distress  Percussion of chest: Normal     Palpation of chest: Normal     Auscultation of lungs: Clear to auscultation  Cardiovascular   Palpation of heart: Normal PMI, no thrills  Auscultation of heart: Normal rate and rhythm, normal S1 and S2, no murmurs  Carotid pulses: 2+ bilaterally  Abdominal aorta: Normal     Femoral pulses: 2+ bilaterally  Pedal pulses: 2+ bilaterally  Examination of extremities for edema and/or varicosities: Normal     Abdomen   Abdomen: Non-tender, no masses  Liver and spleen: No hepatomegaly or splenomegaly  Examination for hernias: No hernia appreciated  Lymphatic   Palpation of lymph nodes in neck: No lymphadenopathy  Palpation of lymph nodes in axillae: No lymphadenopathy  Palpation of lymph nodes in groin: No lymphadenopathy  Palpation of lymph nodes in other areas: No lymphadenopathy  Musculoskeletal   Gait and station: Normal     Digits and nails: Normal without clubbing or cyanosis  Joints, bones, and muscles: Normal     Range of motion: Normal     Stability: Normal     Muscle strength/tone: Normal     Skin   Skin and subcutaneous tissue: Normal without rashes or lesions  Onychomycosis of bilateral great toenails  Palpation of skin and subcutaneous tissue: Normal turgor  Neurologic   Cranial nerves: Cranial nerves II-XII intact  Reflexes: 2+ and symmetric  Sensation: No sensory loss  Psychiatric   Judgment and insight: Normal     Orientation to person, place, and time: Normal     Recent and remote memory: Intact  Mood and affect: Normal        Health Management  History of colon polyps   COLONOSCOPY; every 3 years; Last 47Dkp3383; Next Due: 96Ofg4480;  Active    Future Appointments    Date/Time Provider Specialty Site   06/05/2017 10:40 AM Fátima Mendez DO [facility]     Signatures   Electronically signed by : Anthony Washburn DO; Dec  1 2016 12:56PM EST (Author)

## 2018-01-23 VITALS
RESPIRATION RATE: 16 BRPM | HEART RATE: 82 BPM | BODY MASS INDEX: 25.39 KG/M2 | WEIGHT: 158 LBS | OXYGEN SATURATION: 95 % | TEMPERATURE: 98 F | DIASTOLIC BLOOD PRESSURE: 62 MMHG | HEIGHT: 66 IN | SYSTOLIC BLOOD PRESSURE: 126 MMHG

## 2018-01-23 NOTE — RESULT NOTES
Verified Results  (1) VITAMIN D 25-HYDROXY [id number] 12:34PM Tereza Ritchie     Test Name Result Flag Reference   VIT D 25-HYDROX 31 5 ng/mL  30 0-100 0   This assay is a certified procedure of the CDC Vitamin D Standardization Certification Program (VDSCP)     Deficiency <20ng/ml   Insufficiency 20-30ng/ml   Sufficient  ng/ml     *Patients undergoing fluorescein dye angiography may retain small amounts of fluorescein in the body for 48-72 hours post procedure  Samples containing fluorescein can produce falsely elevated Vitamin D values  If the patient had this procedure, a specimen should be resubmitted post fluorescein clearance

## 2018-01-23 NOTE — PROGRESS NOTES
Assessment    1  Benign essential hypertension (401 1) (I10)   2  Encounter for preventive health examination (V70 0) (Z00 00)   3  Hyperlipidemia (272 4) (E78 5)   4  Medicare annual wellness visit, subsequent (V70 0) (Z00 00)   5  Vitamin D deficiency (268 9) (E55 9)   6  Generalized osteoarthritis (715 00) (M15 9)    Plan  Benign essential hypertension    · Lisinopril-Hydrochlorothiazide 20-25 MG Oral Tablet; 1 every day  Benign essential hypertension, Generalized osteoarthritis, Medicare annual wellness  visit, subsequent, Vitamin D deficiency    · (1) CBC/PLT/DIFF; Status:Active; Requested [id number];    · (1) COMPREHENSIVE METABOLIC PANEL; Status:Active; Requested SPE:[id number];    · (1) LIPID PANEL, FASTING; Status:Active; Requested JGZ:[id number];    · (1) TSH WITH FT4 REFLEX; Status:Active; Requested VKW:81VWQ1638;    · *(Q) VITAMIN D, 25-HYDROXY, LC/MS/MS; Status:Active; Requested OEX:75BDQ4471;   PMH: Knee pain, right    · Ibuprofen 600 MG Oral Tablet; take one tablet every 8 hours as needed for pain        1 Cyrus Parrish MD, Lesley Khan (Gastroenterology) Physician Referral  Consult  Status: Active  Requested for: [id number]  Ordered; For: Colon cancer screening;  Ordered By: Demetri Daley  Performed:   Due: 96XUJ1568   * MAMMO SCREENING BILATERAL W CAD; Status:Hold For - Scheduling; Requested for:[id number];   Perform:[facility]; JXN:[id number];MDCIOQQ;    David Roman for screening mammogram for breast cancer, Medicare annual wellness visit, subsequent; Ordered By:Roxy Guzman;   2 - Trip Haile DPM, Carolee Gardner  (Podiatry) Physician Referral  Consult  Status: Hold For - Scheduling  Requested for: 56NIK0632  Ordered; For: Incurved toenail, Onychomycosis of toenail;  Ordered By: Demetri Daley  Performed:   Due: 89NZD0622     Discussion/Summary    -subsequent annual wellness visit completed  High-dose flu vaccine provided in the office   -Hypertension is well-controlled   She will continue on current dose of lisinopril/hydrochlorothiazide for hypertension   -vitamin-D improved with over-the-counter supplementation  Continue same  -patient has slightly worse cholesterol  Due to some dietary indiscretion and decreased physical activity  Reluctant to go on cholesterol lowering agents  Lifestyle modifications once again advised  -patient is current with screening colonoscopy  Impression: Subsequent Annual Wellness Visit, with preventive exam as well as age and risk appropriate counseling completed  Cardiovascular screening and counseling: screening is current  Diabetes screening and counseling: screening is current  Colorectal cancer screening and counseling: screening is current  Breast cancer screening and counseling: screening is current  Cervical cancer screening and counseling: screening not indicated and Hysterectomy  Possible side effects of new medications were reviewed with the patient/guardian today  The treatment plan was reviewed with the patient/guardian  The patient/guardian understands and agrees with the treatment plan      Chief Complaint  AWV      History of Present Illness  HPI: Patient presents for subsequent annual wellness visit and followup of chronic conditions including hypertension, hyperlipidemia, generalized osteoarthritis of bilateral knees  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  She does work evening to 3 days per week  Somewhat of an irregular sleep schedule  Labs reviewed which showed normal CBC, CMP, TSH  Cholesterol profile is slightly worse with total cholesterol 213, LDL of 131  Slightly higher than her last cholesterol profile but she states that with heard new client she is less active  Vitamin-D level has improved up to 31 5 on over-the-counter supplementation    Patient also complains of discoloration of her great toenail as well as difficulty cutting incurved toenails   She would like referral to podiatrist    Welcome to Medicare and Wellness Visits: The patient is being seen for the subsequent annual wellness visit  Medicare Screening and Risk Factors   Hospitalizations: no previous hospitalizations  Medicare Screening Tests Risk Questions   Abdominal aortic aneurysm risk assessment: none indicated  Osteoporosis risk assessment: , female gender and over 48years of age  HIV risk assessment: none indicated  Drug and Alcohol Use: The patient has never smoked cigarettes and has never used smokeless tobacco  The patient reports rare alcohol use  Alcohol concern:   The patient has no concerns about alcohol abuse  She has never used illicit drugs  Diet and Physical Activity: Current diet includes well balanced meals, 1 servings of fruit per day, 1-2 servings of vegetables per day, 1-2 servings of meat per day, 1-2 servings of whole grains per day, 1-2 servings of simple carbohydrates per day, 1-2 servings of dairy products per day, 1-2 cups of coffee per day, 0 cans of regular soda per day and 0 cans of diet soda per day  She exercises infrequently  Exercise: walking, bilke riding  Mood Disorder and Cognitive Impairment Screening: She denies feeling down, depressed, or hopeless over the past two weeks  She denies feeling little interest or pleasure in doing things over the past two weeks  Cognitive impairment screening: denies difficulty learning/retaining new information, denies difficulty handling complex tasks, denies difficulty with reasoning, denies difficulty with spatial ability and orientation, denies difficulty with language and denies difficulty with behavior  Advance Directives: Advance directives: living will, durable power of  for health care directives and advance directives  Co-Managers and Medical Equipment/Suppliers: See Patient Care Team   Reviewed Updated [facility]:   Last Medicare Wellness Visit Information was reviewed, patient interviewed, no change since last AWV     Preventive Quality Program 65 and Older: Falls Risk: The patient fell none times in the past 12 months  The patient is currently asymptomatic Symptoms Include:  Associated symptoms:  No associated symptoms are reported no pain from the injury  The patient currently has no urinary incontinence symptoms  Patient Care Team    Care Team Member Role Specialty Office Number   Verito NGUYEN  Orthopedic Surgery [phone]   Micah Turcios M D  Gastroenterology Adult [phone]     Review of Systems    Constitutional: negative  Eyes: negative  ENT: negative  Cardiovascular: negative  Respiratory: negative  Gastrointestinal: negative  Genitourinary: negative  Musculoskeletal: negative  Integumentary and Breasts: negative  Neurological: negative  Psychiatric: negative  Endocrine: negative  Hematologic and Lymphatic: negative  Over the past 2 weeks, how often have you been bothered by the following problems? 1 ) Little interest or pleasure in doing things? Not at all    2 ) Feeling down, depressed or hopeless? Not at all    3 ) Trouble falling asleep or sleeping too much? Not at all    4 ) Feeling tired or having little energy? Not at all    5 ) Poor appetite or overeating? Not at all    6 ) Feeling bad about yourself, or that you are a failure, or have let yourself or your family down? Not at all    7 ) Trouble concentrating on things, such as reading a newspaper or watching television? Not at all    8 ) Moving or speaking so slowly that other people could have noticed, or the opposite, moving or speaking faster than usual? Not at all  How difficult have these problems made it for you to do your work, take care of things at home, or get along with people? 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)   8  Generalized osteoarthritis (715 00) (M15 9)   9  History of colon polyps (V12 72) (Z86 010)   10  Hyperlipidemia (272 4) (E78 5)   11  Incurved toenail (703 0) (L60 0)   12  Insomnia (780 52) (G47 00)   13  Medicare annual wellness visit, subsequent (V70 0) (Z00 00)   14  Menopause (627 2)   15  Need for pneumococcal vaccination (V03 82) (Z23)   16  Need for prophylactic vaccination and inoculation against influenza (V04 81) (Z23)   17  Onychomycosis of toenail (110 1) (B35 1)   18  Peripheral neuropathy (356 9) (G62 9)   19  Polyp of sigmoid colon (211 3) (D12 5)   20  Primary osteoarthritis of right knee (715 16) (M17 11)   21  Seasonal allergies (477 9) (J30 2)   22  Vitamin D deficiency (268 9) (E55 9)    Past Medical History    · History of Colon cancer screening (V76 51) (Z12 11)   · History of Hip pain, unspecified laterality   · History of abnormal weight loss (V13 89) (A10 843)   · 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 (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 59) (Z86 018)   · History of dizziness (V13 89) (P91 291)   · History of strain of back (V13 59) (Z87 39)   · History of vertigo (V12 49) (Z87 898)   · History of Knee pain, right (719 46) (M25 561)   · History of Lumbago (724 2) (M54 5)   · History of Muscle ache (729 1) (M79 1)   · History of Need for chickenpox vaccination (V05 4) (Z23)   · History of Ptosis, left eyelid (374 30) (H02 402)   · History of Right knee pain (719 46) (M25 561)   · History of Shoulder joint pain, unspecified laterality   · History of Strain of knee and leg, left (844 9) (B80 839K)   · History of Toxicity From Tricyclic Antidepressants (969 05)   · History of Well adult on routine health check (V70 0) (Z00 00)    The active problems and past medical history were reviewed and updated today        Family History  Mother    · Denied: Family history of Colon cancer   · Denied: Family history of Crohn's disease without complication, unspecified  gastrointestinal tract location   · Denied: Family history of liver disease  Father    · Denied: Family history of Colon cancer   · Denied: Family history of Crohn's disease without complication, unspecified  gastrointestinal tract location   · Denied: Family history of liver disease  Grandparent    · Family history of cardiac disorder (V17 49) (Z82 49)    The family history was reviewed and updated today  Social History    · Alcohol Use (History)   · Rarely   · Caffeine Use   · Marital History - Currently    · Never A Smoker  The social history was reviewed and updated today  The social history was reviewed and is unchanged  Current Meds   1  Aspirin 81 MG TABS; Take 1 tablet daily Recorded   2  Ibuprofen 600 MG Oral Tablet; take one tablet every 8 hours as needed for pain; Therapy: 09CQQ3854 to (Evaluate:75Fdd8351)  Requested for: 00YOW0236; Last   Rx:79Qsn9013 Ordered   3  Lisinopril-Hydrochlorothiazide 20-25 MG Oral Tablet; 1 every day  Requested for:   89GSL2229; Last Rx:13Wnk9198 Ordered   4  Vitamin D3 2000 UNIT Oral Capsule; take 1 capsule daily; Therapy: 06HIG3881 to Recorded   5  Zostavax 11232 UNT/0 65ML Subcutaneous Solution Reconstituted; INJECT 0 65 ML   Once; Therapy: 14JSK9614 to (Evaluate:59Zrf9102); Last Rx:26Fgq8686 Ordered    The medication list was reviewed and updated today  Allergies    1  No Known Drug Allergies    Immunizations   1 2 3 4    Influenza  24-Sep-2013 08-Oct-2014 26-Oct-2015 01-Dec-2016    PCV  09-May-2011 02-May-2016      PPSV  09-May-2011        Vitals  Signs    Temperature: 98 F  Heart Rate: 82  Respiration: 16  Systolic: 983  Diastolic: 62  Height: 5 ft 6 in  Weight: 158 lb   BMI Calculated: 25 5  BSA Calculated: 1 81  O2 Saturation: 95    Physical Exam    Constitutional   General appearance: No acute distress, well appearing and well nourished      Eyes   Conjunctiva and lids: No swelling, erythema or discharge  Pupils and irises: Equal, round, reactive to light  Ophthalmoscopic examination: Normal fundi and optic discs  Ears, Nose, Mouth, and Throat   External inspection of ears and nose: Normal     Otoscopic examination: Tympanic membranes translucent with normal light reflex  Canals patent without erythema  Hearing: Normal     Nasal mucosa, septum, and turbinates: Normal without edema or erythema  Lips, teeth, and gums: Normal, good dentition  Oropharynx: Normal with no erythema, edema, exudate or lesions  Neck   Neck: Supple, symmetric, trachea midline, no masses  Thyroid: Normal, no thyromegaly  Pulmonary   Respiratory effort: No increased work of breathing or signs of respiratory distress  Percussion of chest: Normal     Palpation of chest: Normal     Auscultation of lungs: Clear to auscultation  Cardiovascular   Palpation of heart: Normal PMI, no thrills  Auscultation of heart: Normal rate and rhythm, normal S1 and S2, no murmurs  Carotid pulses: 2+ bilaterally  Abdominal aorta: Normal     Femoral pulses: 2+ bilaterally  Pedal pulses: 2+ bilaterally  Examination of extremities for edema and/or varicosities: Normal     Abdomen   Abdomen: Non-tender, no masses  Liver and spleen: No hepatomegaly or splenomegaly  Examination for hernias: No hernia appreciated  Lymphatic   Palpation of lymph nodes in neck: No lymphadenopathy  Palpation of lymph nodes in axillae: No lymphadenopathy  Palpation of lymph nodes in groin: No lymphadenopathy  Palpation of lymph nodes in other areas: No lymphadenopathy  Musculoskeletal   Gait and station: Normal     Digits and nails: Normal without clubbing or cyanosis  Joints, bones, and muscles: Normal     Range of motion: Normal     Stability: Normal     Muscle strength/tone: Normal     Skin   Skin and subcutaneous tissue: Normal without rashes or lesions  Onychomycosis of bilateral great toenails  Palpation of skin and subcutaneous tissue: Normal turgor  Neurologic   Cranial nerves: Cranial nerves II-XII intact  Reflexes: 2+ and symmetric  Sensation: No sensory loss  Psychiatric   Judgment and insight: Normal     Orientation to person, place, and time: Normal     Recent and remote memory: Intact  Mood and affect: Normal        Health Management  History of colon polyps   COLONOSCOPY; every 3 years; Last 55Cau6479; Next Due: 75Soq3065;  Active    Signatures   Electronically signed by : Edmond Christopher DO; Dec 12 2017 11:50AM EST                       (Author)

## 2018-01-23 NOTE — RESULT NOTES
Verified Results  (1) CBC/PLT/DIFF 76YMP3330 12:34PM [facility] Order Number: [id number]     Test Name Result Flag Reference   WBC COUNT 8 07 Thousand/uL  4 31-10 16   RBC COUNT 3 84 Million/uL  3 81-5 12   HEMOGLOBIN 12 1 g/dL  11 5-15 4   HEMATOCRIT 37 4 %  34 8-46  1   MCV 97 fL  82-98   MCH 31 5 pg  26 8-34 3   MCHC 32 4 g/dL  31 4-37 4   RDW 12 0 %  11 6-15 1   MPV 9 6 fL  8 9-12 7   PLATELET COUNT 581 Thousands/uL  149-390   NEUTROPHILS RELATIVE PERCENT 65 %  43-75   LYMPHOCYTES RELATIVE PERCENT 27 %  14-44   MONOCYTES RELATIVE PERCENT 7 %  4-12   EOSINOPHILS RELATIVE PERCENT 1 %  0-6   BASOPHILS RELATIVE PERCENT 0 %  0-1   NEUTROPHILS ABSOLUTE COUNT 5 22 Thousands/? ??L  1 85-7 62   LYMPHOCYTES ABSOLUTE COUNT 2 15 Thousands/? ??L  0 60-4 47   MONOCYTES ABSOLUTE COUNT 0 58 Thousand/? ??L  0 17-1 22   EOSINOPHILS ABSOLUTE COUNT 0 10 Thousand/? ??L  0 00-0 61   BASOPHILS ABSOLUTE COUNT 0 02 Thousands/? ??L  0 00-0 10     (1) COMPREHENSIVE METABOLIC PANEL 09PJW6698 62:88CO Av [facility] Order Number: [id number]     Test Name Result Flag Reference   SODIUM 140 mmol/L  136-145   POTASSIUM 3 8 mmol/L  3 5-5 3   CHLORIDE 102 mmol/L  100-108   CARBON DIOXIDE 30 mmol/L  21-32   ANION GAP (CALC) 8 mmol/L  4-13   BLOOD UREA NITROGEN 18 mg/dL  5-25   CREATININE 0 93 mg/dL  0 60-1 30   Standardized to IDMS reference method   CALCIUM 8 9 mg/dL  8 3-10 1   BILI, TOTAL 0 50 mg/dL  0 20-1 00   ALK PHOSPHATAS 45 U/L L    ALT (SGPT) 22 U/L  12-78   Specimen collection should occur prior to Sulfasalazine administration due to the potential for falsely depressed results  AST(SGOT) 17 U/L  5-45   Specimen collection should occur prior to Sulfasalazine administration due to the potential for falsely depressed results     ALBUMIN 3 6 g/dL  3 5-5 0   TOTAL PROTEIN 7 1 g/dL  6 4-8 2   eGFR 62 ml/min/1 73sq m     National Kidney Disease Education Program recommendations are as follows:  GFR calculation is accurate only with a steady state creatinine  Chronic Kidney disease less than 60 ml/min/1 73 sq  meters  Kidney failure less than 15 ml/min/1 73 sq  meters  GLUCOSE FASTING 89 mg/dL  65-99   Specimen collection should occur prior to Sulfasalazine administration due to the potential for falsely depressed results  Specimen collection should occur prior to Sulfapyridine administration due to the potential for falsely elevated results  (1) LIPID PANEL, FASTING 53OBF5076 12:34PM Reg[facility] Order Number: [id number]     Test Name Result Flag Reference   CHOLESTEROL 213 mg/dL H    HDL,DIRECT 71 mg/dL H 40-60   Specimen collection should occur prior to Metamizole administration due to the potential for falsley depressed results  LDL CHOLESTEROL CALCULATED 131 mg/dL H 0-100   Triglyceride:        Normal <150 mg/dl   Borderline High 150-199 mg/dl   High 200-499 mg/dl   Very High >499 mg/dl      Cholesterol:       Desirable <200 mg/dl    Borderline High 200-239 mg/dl    High >239 mg/dl      HDL Cholesterol:       High>59 mg/dL    Low <41 mg/dL      This screening LDL is a calculated result  It does not have the accuracy of the Direct Measured LDL in the monitoring of patients with hyperlipidemia and/or statin therapy  Direct Measure LDL (QBQ136) must be ordered separately in these patients  TRIGLYCERIDES 55 mg/dL  <=150   Specimen collection should occur prior to N-Acetylcysteine or Metamizole administration due to the potential for falsely depressed results  (1) TSH WITH FT4 REFLEX 60HWS4264 12:34PM Deatrice Mean   TW Order Number: [id number]     Test Name Result Flag Reference   TSH 1 223 uIU/mL  0 358-3 740   Patients undergoing fluorescein dye angiography may retain small amounts of fluorescein in the body for 48-72 hours post procedure  Samples containing fluorescein can produce falsely depressed TSH values   If the patient had this procedure,a specimen should be resubmitted post fluorescein clearance            The recommended reference ranges for TSH during pregnancy are as follows:  First trimester 0 1 to 2 5 uIU/mL  Second trimester  0 2 to 3 0 uIU/mL  Third trimester 0 3 to 3 0 uIU/m

## 2018-06-12 ENCOUNTER — APPOINTMENT (OUTPATIENT)
Dept: LAB | Facility: CLINIC | Age: 73
End: 2018-06-12
Payer: MEDICARE

## 2018-06-12 ENCOUNTER — TRANSCRIBE ORDERS (OUTPATIENT)
Dept: LAB | Facility: CLINIC | Age: 73
End: 2018-06-12

## 2018-06-12 DIAGNOSIS — E55.9 VITAMIN D DEFICIENCY: ICD-10-CM

## 2018-06-12 DIAGNOSIS — I10 ESSENTIAL HYPERTENSION, MALIGNANT: ICD-10-CM

## 2018-06-12 DIAGNOSIS — E55.9 AVITAMINOSIS D: ICD-10-CM

## 2018-06-12 DIAGNOSIS — I10 ESSENTIAL (PRIMARY) HYPERTENSION: ICD-10-CM

## 2018-06-12 DIAGNOSIS — Z00.00 ROUTINE GENERAL MEDICAL EXAMINATION AT A HEALTH CARE FACILITY: ICD-10-CM

## 2018-06-12 DIAGNOSIS — Z00.00 ENCOUNTER FOR GENERAL ADULT MEDICAL EXAMINATION WITHOUT ABNORMAL FINDINGS: ICD-10-CM

## 2018-06-12 DIAGNOSIS — I10 ESSENTIAL HYPERTENSION, MALIGNANT: Primary | ICD-10-CM

## 2018-06-12 DIAGNOSIS — M15.9 POLYOSTEOARTHRITIS: ICD-10-CM

## 2018-06-12 DIAGNOSIS — M15.9 GENERALIZED OSTEOARTHROSIS, INVOLVING MULTIPLE SITES: ICD-10-CM

## 2018-06-12 LAB
25(OH)D3 SERPL-MCNC: 45.9 NG/ML (ref 30–100)
ALBUMIN SERPL BCP-MCNC: 3.8 G/DL (ref 3.5–5)
ALP SERPL-CCNC: 45 U/L (ref 46–116)
ALT SERPL W P-5'-P-CCNC: 22 U/L (ref 12–78)
ANION GAP SERPL CALCULATED.3IONS-SCNC: 6 MMOL/L (ref 4–13)
AST SERPL W P-5'-P-CCNC: 18 U/L (ref 5–45)
BASOPHILS # BLD AUTO: 0.04 THOUSANDS/ΜL (ref 0–0.1)
BASOPHILS NFR BLD AUTO: 1 % (ref 0–1)
BILIRUB SERPL-MCNC: 0.4 MG/DL (ref 0.2–1)
BUN SERPL-MCNC: 15 MG/DL (ref 5–25)
CALCIUM SERPL-MCNC: 8.9 MG/DL (ref 8.3–10.1)
CHLORIDE SERPL-SCNC: 101 MMOL/L (ref 100–108)
CHOLEST SERPL-MCNC: 232 MG/DL (ref 50–200)
CO2 SERPL-SCNC: 31 MMOL/L (ref 21–32)
CREAT SERPL-MCNC: 0.84 MG/DL (ref 0.6–1.3)
EOSINOPHIL # BLD AUTO: 0.1 THOUSAND/ΜL (ref 0–0.61)
EOSINOPHIL NFR BLD AUTO: 2 % (ref 0–6)
ERYTHROCYTE [DISTWIDTH] IN BLOOD BY AUTOMATED COUNT: 12.3 % (ref 11.6–15.1)
GFR SERPL CREATININE-BSD FRML MDRD: 70 ML/MIN/1.73SQ M
GLUCOSE P FAST SERPL-MCNC: 102 MG/DL (ref 65–99)
HCT VFR BLD AUTO: 37.5 % (ref 34.8–46.1)
HDLC SERPL-MCNC: 63 MG/DL (ref 40–60)
HGB BLD-MCNC: 12.4 G/DL (ref 11.5–15.4)
LDLC SERPL CALC-MCNC: 149 MG/DL (ref 0–100)
LYMPHOCYTES # BLD AUTO: 1.78 THOUSANDS/ΜL (ref 0.6–4.47)
LYMPHOCYTES NFR BLD AUTO: 38 % (ref 14–44)
MCH RBC QN AUTO: 32 PG (ref 26.8–34.3)
MCHC RBC AUTO-ENTMCNC: 33.1 G/DL (ref 31.4–37.4)
MCV RBC AUTO: 97 FL (ref 82–98)
MONOCYTES # BLD AUTO: 0.38 THOUSAND/ΜL (ref 0.17–1.22)
MONOCYTES NFR BLD AUTO: 8 % (ref 4–12)
NEUTROPHILS # BLD AUTO: 2.44 THOUSANDS/ΜL (ref 1.85–7.62)
NEUTS SEG NFR BLD AUTO: 52 % (ref 43–75)
NONHDLC SERPL-MCNC: 169 MG/DL
PLATELET # BLD AUTO: 254 THOUSANDS/UL (ref 149–390)
PMV BLD AUTO: 9.3 FL (ref 8.9–12.7)
POTASSIUM SERPL-SCNC: 4.1 MMOL/L (ref 3.5–5.3)
PROT SERPL-MCNC: 7.4 G/DL (ref 6.4–8.2)
RBC # BLD AUTO: 3.88 MILLION/UL (ref 3.81–5.12)
SODIUM SERPL-SCNC: 138 MMOL/L (ref 136–145)
TRIGL SERPL-MCNC: 98 MG/DL
TSH SERPL DL<=0.05 MIU/L-ACNC: 1.66 UIU/ML (ref 0.36–3.74)
WBC # BLD AUTO: 4.74 THOUSAND/UL (ref 4.31–10.16)

## 2018-06-12 PROCEDURE — 82306 VITAMIN D 25 HYDROXY: CPT

## 2018-06-12 PROCEDURE — 85025 COMPLETE CBC W/AUTO DIFF WBC: CPT

## 2018-06-12 PROCEDURE — 36415 COLL VENOUS BLD VENIPUNCTURE: CPT

## 2018-06-12 PROCEDURE — 84443 ASSAY THYROID STIM HORMONE: CPT

## 2018-06-12 PROCEDURE — 80053 COMPREHEN METABOLIC PANEL: CPT

## 2018-06-12 PROCEDURE — 80061 LIPID PANEL: CPT

## 2018-06-13 RX ORDER — MAG HYDROX/ALUMINUM HYD/SIMETH 400-400-40
2000 SUSPENSION, ORAL (FINAL DOSE FORM) ORAL EVERY OTHER DAY
COMMUNITY
Start: 2017-06-05

## 2018-06-18 ENCOUNTER — OFFICE VISIT (OUTPATIENT)
Dept: FAMILY MEDICINE CLINIC | Facility: CLINIC | Age: 73
End: 2018-06-18
Payer: MEDICARE

## 2018-06-18 VITALS
DIASTOLIC BLOOD PRESSURE: 60 MMHG | HEIGHT: 66 IN | OXYGEN SATURATION: 96 % | RESPIRATION RATE: 16 BRPM | HEART RATE: 81 BPM | BODY MASS INDEX: 24.72 KG/M2 | SYSTOLIC BLOOD PRESSURE: 122 MMHG | WEIGHT: 153.8 LBS

## 2018-06-18 DIAGNOSIS — Z12.39 BREAST CANCER SCREENING: ICD-10-CM

## 2018-06-18 DIAGNOSIS — G62.9 PERIPHERAL POLYNEUROPATHY: ICD-10-CM

## 2018-06-18 DIAGNOSIS — I10 BENIGN ESSENTIAL HYPERTENSION: Primary | ICD-10-CM

## 2018-06-18 DIAGNOSIS — E78.2 MIXED HYPERLIPIDEMIA: ICD-10-CM

## 2018-06-18 DIAGNOSIS — E55.9 VITAMIN D DEFICIENCY: ICD-10-CM

## 2018-06-18 PROCEDURE — 99215 OFFICE O/P EST HI 40 MIN: CPT | Performed by: FAMILY MEDICINE

## 2018-06-18 RX ORDER — IBUPROFEN 600 MG/1
600 TABLET ORAL EVERY 6 HOURS PRN
COMMUNITY
End: 2019-07-15 | Stop reason: SDUPTHER

## 2018-06-18 NOTE — ASSESSMENT & PLAN NOTE
Significant improvement in vitamin-D level on over-the-counter supplementation  Continue same  Recheck vitamin-D level in 6 months

## 2018-06-18 NOTE — ASSESSMENT & PLAN NOTE
Order provided for screening mammogram   Patient is very much overdue for testing    It has been years since her last mammogram

## 2018-06-18 NOTE — ASSESSMENT & PLAN NOTE
Cholesterol is largely unchanged  Patient is still reluctant to medication     Patient will need to make dietary changes including the limitation of ice cream and pastries

## 2018-06-18 NOTE — PROGRESS NOTES
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, unspecified     Last assessed: 10/17/13    Colon polyps     Depression     Hyperlipidemia     Hypertension     Lumbago     last assessed: 1/15/14    Menopause     Neuropathy     Ptosis, left eyelid     last assessed: 10/16/15    Shortness of breath     Vertigo     last assessed: 10/26/15       Family History   Problem Relation Age of Onset    Heart disease Other         cardiac disorder       Past Surgical History:   Procedure Laterality Date    BLADDER SURGERY      HYSTERECTOMY      IA COLONOSCOPY FLX DX W/COLLJ SPEC WHEN PFRMD N/A 4/11/2016    Procedure: COLONOSCOPY;  Surgeon: Mecca Anderson MD;  Location: [facility]; Service: Gastroenterology        reports that she has never smoked  She has never used smokeless tobacco  She reports that she drinks alcohol  She reports that she does not use drugs  Current Outpatient Prescriptions:     acetaminophen (TYLENOL) 500 mg tablet, Take 500 mg by mouth every 6 (six) hours as needed for mild pain , Disp: , Rfl:     aspirin 81 MG tablet, Take 81 mg by mouth daily  , Disp: , Rfl:     Cholecalciferol (VITAMIN D3) 5000 units CAPS, Take 2,000 Units by mouth every other day  , Disp: , Rfl:     ibuprofen (MOTRIN) 600 mg tablet, Take 600 mg by mouth every 6 (six) hours as needed for mild pain, Disp: , Rfl:     lisinopril-hydrochlorothiazide (PRINZIDE,ZESTORETIC) 20-25 MG per tablet, Take 1 tablet by mouth daily Indications: take 1/2 tablet dialy  , Disp: , Rfl:     The following portions of the patient's history were reviewed and updated as appropriate: allergies, current medications, past family history, past medical history, past social history, past surgical history and problem list     Review of Systems   Constitutional: Negative  HENT: Negative  Eyes: Negative  Respiratory: Negative  Cardiovascular: Negative  Gastrointestinal: Negative  Endocrine: Negative  Genitourinary: Negative  Musculoskeletal: Negative  Skin: Negative  Allergic/Immunologic: Negative  Neurological: Positive for facial asymmetry (mild left facial weakness)  Negative for speech difficulty  Hematological: Negative  Psychiatric/Behavioral: Negative  Objective:    /60   Pulse 81   Resp 16   Ht 5' 6" (1 676 m)   Wt 69 8 kg (153 lb 12 8 oz)   SpO2 96%   BMI 24 82 kg/m²      Physical Exam   Constitutional: She is oriented to person, place, and time  She appears well-developed and well-nourished  HENT:   Head: Normocephalic and atraumatic  Eyes: Conjunctivae are normal  Pupils are equal, round, and reactive to light  Neck: Normal range of motion  Neck supple  Cardiovascular: Normal rate and regular rhythm  Pulmonary/Chest: Effort normal and breath sounds normal    Abdominal: Soft  Bowel sounds are normal    Neurological: She is alert and oriented to person, place, and time  She has normal reflexes  Very mild ptosis of the left eyelid   Psychiatric: She has a normal mood and affect  Her behavior is normal  Judgment and thought content normal    Nursing note and vitals reviewed          Recent Results (from the past 1008 hour(s))   Lipid panel    Collection Time: 06/12/18  8:54 AM   Result Value Ref Range    Cholesterol 232 (H) 50 - 200 mg/dL    Triglycerides 98 <=150 mg/dL    HDL, Direct 63 (H) 40 - 60 mg/dL LDL Calculated 149 (H) 0 - 100 mg/dL    Non-HDL-Chol (CHOL-HDL) 169 mg/dl   CBC and differential    Collection Time: 06/12/18  8:54 AM   Result Value Ref Range    WBC 4 74 4 31 - 10 16 Thousand/uL    RBC 3 88 3 81 - 5 12 Million/uL    Hemoglobin 12 4 11 5 - 15 4 g/dL    Hematocrit 37 5 34 8 - 46 1 %    MCV 97 82 - 98 fL    MCH 32 0 26 8 - 34 3 pg    MCHC 33 1 31 4 - 37 4 g/dL    RDW 12 3 11 6 - 15 1 %    MPV 9 3 8 9 - 12 7 fL    Platelets 934 435 - 926 Thousands/uL    Neutrophils Relative 52 43 - 75 %    Lymphocytes Relative 38 14 - 44 %    Monocytes Relative 8 4 - 12 %    Eosinophils Relative 2 0 - 6 %    Basophils Relative 1 0 - 1 %    Neutrophils Absolute 2 44 1 85 - 7 62 Thousands/µL    Lymphocytes Absolute 1 78 0 60 - 4 47 Thousands/µL    Monocytes Absolute 0 38 0 17 - 1 22 Thousand/µL    Eosinophils Absolute 0 10 0 00 - 0 61 Thousand/µL    Basophils Absolute 0 04 0 00 - 0 10 Thousands/µL   Comprehensive metabolic panel    Collection Time: 06/12/18  8:54 AM   Result Value Ref Range    Sodium 138 136 - 145 mmol/L    Potassium 4 1 3 5 - 5 3 mmol/L    Chloride 101 100 - 108 mmol/L    CO2 31 21 - 32 mmol/L    Anion Gap 6 4 - 13 mmol/L    BUN 15 5 - 25 mg/dL    Creatinine 0 84 0 60 - 1 30 mg/dL    Glucose, Fasting 102 (H) 65 - 99 mg/dL    Calcium 8 9 8 3 - 10 1 mg/dL    AST 18 5 - 45 U/L    ALT 22 12 - 78 U/L    Alkaline Phosphatase 45 (L) 46 - 116 U/L    Total Protein 7 4 6 4 - 8 2 g/dL    Albumin 3 8 3 5 - 5 0 g/dL    Total Bilirubin 0 40 0 20 - 1 00 mg/dL    eGFR 70 ml/min/1 73sq m   TSH, 3rd generation with T4 reflex    Collection Time: 06/12/18  8:54 AM   Result Value Ref Range    TSH 3RD GENERATON 1 655 0 358 - 3 740 uIU/mL   Vitamin D 25 hydroxy    Collection Time: 06/12/18  8:54 AM   Result Value Ref Range    Vit D, 25-Hydroxy 45 9 30 0 - 100 0 ng/mL       Assessment/Plan:    No problem-specific Assessment & Plan notes found for this encounter             Problem List Items Addressed This Visit     Benign essential hypertension - Primary     Well controlled on zestoretic  Refill is not necessary at this time  Relevant Orders    CBC and differential    Comprehensive metabolic panel    TSH, 3rd generation with Free T4 reflex    Hyperlipidemia       Cholesterol is largely unchanged  Patient is still reluctant to medication  Patient will need to make dietary changes including the limitation of ice cream and pastries         Relevant Orders    TSH, 3rd generation with Free T4 reflex    Lipid panel    RESOLVED: Peripheral neuropathy    Vitamin D deficiency      Significant improvement in vitamin-D level on over-the-counter supplementation  Continue same  Recheck vitamin-D level in 6 months  Relevant Orders    Vitamin D 1,25 dihydroxy    Breast cancer screening      Order provided for screening mammogram   Patient is very much overdue for testing  It has been years since her last mammogram         Relevant Orders    Mammo screening bilateral w cad          I have spent 41 minutes with Patient  today in which greater than 50% of this time was spent in counseling/coordination of care regarding Diagnostic results, Prognosis, Risks and benefits of tx options, Intructions for management, Patient and family education, Importance of tx compliance, Risk factor reductions and Impressions

## 2018-07-12 DIAGNOSIS — I10 ESSENTIAL HYPERTENSION: Primary | ICD-10-CM

## 2018-07-12 RX ORDER — LISINOPRIL AND HYDROCHLOROTHIAZIDE 25; 20 MG/1; MG/1
1 TABLET ORAL DAILY
Qty: 90 TABLET | Refills: 0 | Status: SHIPPED | OUTPATIENT
Start: 2018-07-12 | End: 2019-07-15 | Stop reason: SDUPTHER

## 2018-12-06 ENCOUNTER — APPOINTMENT (OUTPATIENT)
Dept: LAB | Facility: CLINIC | Age: 73
End: 2018-12-06
Payer: MEDICARE

## 2018-12-06 DIAGNOSIS — I10 BENIGN ESSENTIAL HYPERTENSION: ICD-10-CM

## 2018-12-06 DIAGNOSIS — E55.9 VITAMIN D DEFICIENCY: ICD-10-CM

## 2018-12-06 DIAGNOSIS — E78.2 MIXED HYPERLIPIDEMIA: ICD-10-CM

## 2018-12-06 LAB
ALBUMIN SERPL BCP-MCNC: 4.1 G/DL (ref 3.5–5)
ALP SERPL-CCNC: 51 U/L (ref 46–116)
ALT SERPL W P-5'-P-CCNC: 25 U/L (ref 12–78)
ANION GAP SERPL CALCULATED.3IONS-SCNC: 9 MMOL/L (ref 4–13)
AST SERPL W P-5'-P-CCNC: 15 U/L (ref 5–45)
BASOPHILS # BLD AUTO: 0.03 THOUSANDS/ΜL (ref 0–0.1)
BASOPHILS NFR BLD AUTO: 0 % (ref 0–1)
BILIRUB SERPL-MCNC: 0.4 MG/DL (ref 0.2–1)
BUN SERPL-MCNC: 19 MG/DL (ref 5–25)
CALCIUM SERPL-MCNC: 9.6 MG/DL (ref 8.3–10.1)
CHLORIDE SERPL-SCNC: 99 MMOL/L (ref 100–108)
CHOLEST SERPL-MCNC: 244 MG/DL (ref 50–200)
CO2 SERPL-SCNC: 30 MMOL/L (ref 21–32)
CREAT SERPL-MCNC: 0.92 MG/DL (ref 0.6–1.3)
EOSINOPHIL # BLD AUTO: 0.12 THOUSAND/ΜL (ref 0–0.61)
EOSINOPHIL NFR BLD AUTO: 2 % (ref 0–6)
ERYTHROCYTE [DISTWIDTH] IN BLOOD BY AUTOMATED COUNT: 12.1 % (ref 11.6–15.1)
GFR SERPL CREATININE-BSD FRML MDRD: 62 ML/MIN/1.73SQ M
GLUCOSE P FAST SERPL-MCNC: 88 MG/DL (ref 65–99)
HCT VFR BLD AUTO: 39.3 % (ref 34.8–46.1)
HDLC SERPL-MCNC: 67 MG/DL (ref 40–60)
HGB BLD-MCNC: 13.4 G/DL (ref 11.5–15.4)
IMM GRANULOCYTES # BLD AUTO: 0.01 THOUSAND/UL (ref 0–0.2)
IMM GRANULOCYTES NFR BLD AUTO: 0 % (ref 0–2)
LDLC SERPL CALC-MCNC: 156 MG/DL (ref 0–100)
LYMPHOCYTES # BLD AUTO: 1.88 THOUSANDS/ΜL (ref 0.6–4.47)
LYMPHOCYTES NFR BLD AUTO: 27 % (ref 14–44)
MCH RBC QN AUTO: 32.9 PG (ref 26.8–34.3)
MCHC RBC AUTO-ENTMCNC: 34.1 G/DL (ref 31.4–37.4)
MCV RBC AUTO: 97 FL (ref 82–98)
MONOCYTES # BLD AUTO: 0.53 THOUSAND/ΜL (ref 0.17–1.22)
MONOCYTES NFR BLD AUTO: 8 % (ref 4–12)
NEUTROPHILS # BLD AUTO: 4.32 THOUSANDS/ΜL (ref 1.85–7.62)
NEUTS SEG NFR BLD AUTO: 63 % (ref 43–75)
NONHDLC SERPL-MCNC: 177 MG/DL
NRBC BLD AUTO-RTO: 0 /100 WBCS
PLATELET # BLD AUTO: 267 THOUSANDS/UL (ref 149–390)
PMV BLD AUTO: 9.2 FL (ref 8.9–12.7)
POTASSIUM SERPL-SCNC: 3.8 MMOL/L (ref 3.5–5.3)
PROT SERPL-MCNC: 7.8 G/DL (ref 6.4–8.2)
RBC # BLD AUTO: 4.07 MILLION/UL (ref 3.81–5.12)
SODIUM SERPL-SCNC: 138 MMOL/L (ref 136–145)
TRIGL SERPL-MCNC: 107 MG/DL
TSH SERPL DL<=0.05 MIU/L-ACNC: 1.09 UIU/ML (ref 0.36–3.74)
WBC # BLD AUTO: 6.89 THOUSAND/UL (ref 4.31–10.16)

## 2018-12-06 PROCEDURE — 85025 COMPLETE CBC W/AUTO DIFF WBC: CPT

## 2018-12-06 PROCEDURE — 80053 COMPREHEN METABOLIC PANEL: CPT

## 2018-12-06 PROCEDURE — 82652 VIT D 1 25-DIHYDROXY: CPT

## 2018-12-06 PROCEDURE — 84443 ASSAY THYROID STIM HORMONE: CPT

## 2018-12-06 PROCEDURE — 36415 COLL VENOUS BLD VENIPUNCTURE: CPT

## 2018-12-06 PROCEDURE — 80061 LIPID PANEL: CPT

## 2018-12-07 LAB — 1,25(OH)2D3 SERPL-MCNC: 41.8 PG/ML (ref 19.9–79.3)

## 2018-12-13 ENCOUNTER — OFFICE VISIT (OUTPATIENT)
Dept: FAMILY MEDICINE CLINIC | Facility: CLINIC | Age: 73
End: 2018-12-13
Payer: MEDICARE

## 2018-12-13 VITALS
OXYGEN SATURATION: 98 % | WEIGHT: 153 LBS | SYSTOLIC BLOOD PRESSURE: 102 MMHG | HEIGHT: 66 IN | DIASTOLIC BLOOD PRESSURE: 60 MMHG | BODY MASS INDEX: 24.59 KG/M2 | HEART RATE: 70 BPM

## 2018-12-13 DIAGNOSIS — Z00.00 MEDICARE ANNUAL WELLNESS VISIT, SUBSEQUENT: Primary | ICD-10-CM

## 2018-12-13 DIAGNOSIS — E55.9 VITAMIN D DEFICIENCY: ICD-10-CM

## 2018-12-13 DIAGNOSIS — Z23 FLU VACCINE NEED: ICD-10-CM

## 2018-12-13 DIAGNOSIS — G47.00 INSOMNIA, UNSPECIFIED TYPE: ICD-10-CM

## 2018-12-13 DIAGNOSIS — I10 BENIGN ESSENTIAL HYPERTENSION: ICD-10-CM

## 2018-12-13 DIAGNOSIS — E78.2 MIXED HYPERLIPIDEMIA: ICD-10-CM

## 2018-12-13 PROCEDURE — G0439 PPPS, SUBSEQ VISIT: HCPCS | Performed by: FAMILY MEDICINE

## 2018-12-13 PROCEDURE — 99213 OFFICE O/P EST LOW 20 MIN: CPT | Performed by: FAMILY MEDICINE

## 2018-12-13 PROCEDURE — 90662 IIV NO PRSV INCREASED AG IM: CPT

## 2018-12-13 PROCEDURE — G0008 ADMIN INFLUENZA VIRUS VAC: HCPCS

## 2018-12-13 NOTE — PROGRESS NOTES
Assessment and Plan:    Problem List Items Addressed This Visit     None        Health Maintenance Due   Topic Date Due    Hepatitis C Screening  1945    MAMMOGRAM  1945    DTaP,Tdap,and Td Vaccines (1 - Tdap) 08/07/1966    INFLUENZA VACCINE  07/01/2018    Medicare Annual Wellness Visit (AWV)  12/12/2018         HPI:  Fred Giles is a 68 y o  female here for her Subsequent Wellness Visit  Patient Active Problem List   Diagnosis    Benign essential hypertension    Benign neoplasm of bone    Disc degeneration, lumbar    Facial nerve disorder    Generalized osteoarthritis    Hyperlipidemia    Insomnia    Symptomatic menopausal or female climacteric states    Polyp of sigmoid colon    Primary osteoarthritis of right knee    Vertigo    Vitamin D deficiency    Breast cancer screening     Past Medical History:   Diagnosis Date    Arthritis     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, unspecified     Last assessed: 10/17/13    Colon polyps     Depression     Hyperlipidemia     Hypertension     Lumbago     last assessed: 1/15/14    Menopause     Neuropathy     Ptosis, left eyelid     last assessed: 10/16/15    Shortness of breath     Vertigo     last assessed: 10/26/15     Past Surgical History:   Procedure Laterality Date    BLADDER SURGERY      HYSTERECTOMY      VT COLONOSCOPY FLX DX W/COLLJ SPEC WHEN PFRMD N/A 4/11/2016    Procedure: COLONOSCOPY;  Surgeon: Rupesh Eagle MD;  Location: [facility];   Service: Gastroenterology     Family History   Problem Relation Age of Onset    Heart disease Other         cardiac disorder     History   Smoking Status    Never Smoker   Smokeless Tobacco    Never Used     History   Alcohol Use    Yes     Comment: social, rarely, per [employer]      History   Drug Use No       Current Outpatient Prescriptions   Medication Sig Dispense Refill    acetaminophen (TYLENOL) 500 mg tablet Take 500 mg by mouth every 6 (six) hours as needed for mild pain   aspirin 81 MG tablet Take 81 mg by mouth daily   Cholecalciferol (VITAMIN D3) 5000 units CAPS Take 2,000 Units by mouth every other day        ibuprofen (MOTRIN) 600 mg tablet Take 600 mg by mouth every 6 (six) hours as needed for mild pain      lisinopril-hydrochlorothiazide (PRINZIDE,ZESTORETIC) 20-25 MG per tablet Take 1 tablet by mouth daily 90 tablet 0     No current facility-administered medications for this visit  Allergies   Allergen Reactions    Benzoyl Peroxide Rash     Immunization History   Administered Date(s) Administered    Influenza Split High Dose Preservative Free IM 09/24/2013, 10/08/2014, 10/26/2015, 12/01/2016, 12/12/2017    Pneumococcal Conjugate 13-Valent 05/02/2016    Pneumococcal Conjugate PCV 7 05/09/2011    Pneumococcal Polysaccharide PPV23 05/09/2011       Patient Care Team:  Deanna Mcintyre DO as PCP - General  Mortimer Mathieu, MD Freddi Bast, MD    Medicare Screening Tests and Risk Assessments:  Diana Padgett is here for her Subsequent Wellness visit  Last Medicare Wellness visit information reviewed, patient interviewed, no change since last AWV  Health Risk Assessment:  Patient rates overall health as good  Patient feels that their physical health rating is Same  Eyesight was rated as Same  Hearing was rated as Same  Patient feels that their emotional and mental health rating is Same  Pain experienced by patient in the last 7 days has been Some  Patient's pain rating has been 3/10  Emotional/Mental Health:  Patient has been feeling nervous/anxious  PHQ-9 Depression Screening:    Frequency of the following problems over the past two weeks:      1  Little interest or pleasure in doing things: 0 - not at all      2  Feeling down, depressed, or hopeless: 0 - not at all  PHQ-2 Score: 0          Broken Bones/Falls:     Fall Risk Assessment:    In the past year, patient has experienced: No history of falling in past year          Bladder/Bowel:  Patient has leaked urine accidently in the last six months  Patient reports no loss of bowel control  Immunizations:  Patient has not had a flu vaccination within the last year  Patient has received a pneumonia shot  Patient has not received a shingles shot  Home Safety:  Patient does not have trouble with stairs inside or outside of their home  Patient currently reports that there are safety hazards present in home , working smoke alarms, working carbon monoxide detectors  Preventative Screenings:   No breast cancer screening performed, colon cancer screen completed, cholesterol screen completed, glaucoma eye exam completed,     Nutrition:  Current diet: Regular with servings of the following:    Medications:  Patient is currently taking over-the-counter supplements  Patient is able to manage medications  Lifestyle Choices:  Patient reports no tobacco use  Patient has not smoked or used tobacco in the past   Patient reports no alcohol use  Patient drives a vehicle  Patient wears seat belt  Activities of Daily Living:  Can get out of bed by his or her self, able to dress self, able to make own meals, able to do own shopping, able to bathe self, unable to do laundry/housekeeping, can manage own money, pay bills and track expenses    Previous Hospitalizations:  No hospitalization or ED visit in past 12 months        Advanced Directives:  Patient has not decided on power of   Patient has not completed advanced directive          Preventative Screening/Counseling:      Cardiovascular:      General: Screening Current and Risks and Benefits Discussed      Counseling: Healthy Diet, Healthy Weight and Improve Cholesterol          Diabetes:      General: Screening Current      Counseling: Healthy Diet and Healthy Weight          Colorectal Cancer:      General: Screening Current          Cervical Cancer:      General: Screening Current Osteoporosis:      Counseling: Calcium and Vitamin D Intake and Regular Weightbearing Exercise          AAA:      General: Patient Declines and Screening Not Indicated          Glaucoma:      Referrals: Optometry      Comments: Dr Jeneane Burkitt        HIV:      General: Screening Not Indicated and Patient Declines          Hepatitis C:      General: Patient Declines and Screening Not Indicated        Advanced Directives:   Patient has living will for healthcare, has durable POA for healthcare, patient has an advanced directive  Information on ACP and/or AD not provided  No 5 wishes given  End of life assessment reviewed with patient  Provider agrees with end of life decisions        Immunizations:      Influenza: Influenza Due Today      Pneumococcal: Lifetime Vaccine Completed      Shingrix: Shingrix Vaccine Needed Today      Zostavax: Zostavax Vaccine UTD

## 2018-12-13 NOTE — PROGRESS NOTES
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  She is having difficulty adjusting to that schedule  Patient does need to complete screening mammogram   She still has order for screening mammogram   Labs reviewed which showed normal CBC, CMP, elevated cholesterol, normal TSH and vitamin-D level  Patient attributes her elevated cholesterol to dietary indiscretion of potato chips and ice cream   She is also eating more cookies recently  She is current with screening colonoscopy  Patient remains on the counter vitamin-D supplementation lisinopril/hydrochlorothiazide  Hypertension very well controlled  Past Medical History:   Diagnosis Date    Arthritis     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, unspecified     Last assessed: 10/17/13    Colon polyps     Depression     Hyperlipidemia     Hypertension     Lumbago     last assessed: 1/15/14    Menopause     Neuropathy     Ptosis, left eyelid     last assessed: 10/16/15    Shortness of breath     Vertigo     last assessed: 10/26/15       Family History   Problem Relation Age of Onset    Heart disease Other         cardiac disorder       Past Surgical History:   Procedure Laterality Date    BLADDER SURGERY      HYSTERECTOMY      IA COLONOSCOPY FLX DX W/COLLJ SPEC WHEN PFRMD N/A 4/11/2016    Procedure: COLONOSCOPY;  Surgeon: Saima Pathak MD;  Location: [facility]; Service: Gastroenterology        reports that she has never smoked  She has never used smokeless tobacco  She reports that she drinks alcohol  She reports that she does not use drugs        Current Outpatient Prescriptions:     acetaminophen (TYLENOL) 500 mg tablet, Take 500 mg by mouth every 6 (six) hours as needed for mild pain , Disp: , Rfl:     aspirin 81 MG tablet, Take 81 mg by mouth daily  , Disp: , Rfl:     Cholecalciferol (VITAMIN D3) 5000 units CAPS, Take 2,000 Units by mouth every other day  , Disp: , Rfl:     ibuprofen (MOTRIN) 600 mg tablet, Take 600 mg by mouth every 6 (six) hours as needed for mild pain, Disp: , Rfl:     lisinopril-hydrochlorothiazide (PRINZIDE,ZESTORETIC) 20-25 MG per tablet, Take 1 tablet by mouth daily, Disp: 90 tablet, Rfl: 0    The following portions of the patient's history were reviewed and updated as appropriate: allergies, current medications, past family history, past medical history, past social history, past surgical history and problem list     Review of Systems   Constitutional: Positive for fatigue  HENT: Negative  Eyes: Negative  Respiratory: Negative  Cardiovascular: Negative  Gastrointestinal: Negative  Endocrine: Negative  Genitourinary: Negative  Musculoskeletal: Negative  Skin: Negative  Allergic/Immunologic: Negative  Neurological: Positive for facial asymmetry (mild left facial weakness)  Negative for speech difficulty  Hematological: Negative  Psychiatric/Behavioral: Positive for sleep disturbance (Shift work sleep disorder)  Objective:    /60   Pulse 70   Ht 5' 6" (1 676 m)   Wt 69 4 kg (153 lb)   SpO2 98%   BMI 24 69 kg/m²      Physical Exam   Constitutional: She is oriented to person, place, and time  She appears well-developed and well-nourished  No distress  Appears tired   HENT:   Head: Normocephalic and atraumatic  Eyes: Pupils are equal, round, and reactive to light  Conjunctivae are normal    Neck: Normal range of motion  Neck supple  Cardiovascular: Normal rate and regular rhythm  Pulmonary/Chest: Effort normal and breath sounds normal    Abdominal: Soft  Bowel sounds are normal    Musculoskeletal: She exhibits no edema  Neurological: She is alert and oriented to person, place, and time  She has normal reflexes  Very mild ptosis of the left eyelid   Skin: She is not diaphoretic  Psychiatric: She has a normal mood and affect  Her behavior is normal  Judgment and thought content normal    Nursing note and vitals reviewed          Recent Results (from the past 1008 hour(s))   CBC and differential    Collection Time: 12/06/18 12:18 PM   Result Value Ref Range    WBC 6 89 4 31 - 10 16 Thousand/uL    RBC 4 07 3 81 - 5 12 Million/uL    Hemoglobin 13 4 11 5 - 15 4 g/dL    Hematocrit 39 3 34 8 - 46 1 %    MCV 97 82 - 98 fL    MCH 32 9 26 8 - 34 3 pg    MCHC 34 1 31 4 - 37 4 g/dL    RDW 12 1 11 6 - 15 1 %    MPV 9 2 8 9 - 12 7 fL    Platelets 304 359 - 189 Thousands/uL    nRBC 0 /100 WBCs    Neutrophils Relative 63 43 - 75 %    Immat GRANS % 0 0 - 2 %    Lymphocytes Relative 27 14 - 44 %    Monocytes Relative 8 4 - 12 %    Eosinophils Relative 2 0 - 6 %    Basophils Relative 0 0 - 1 %    Neutrophils Absolute 4 32 1 85 - 7 62 Thousands/µL    Immature Grans Absolute 0 01 0 00 - 0 20 Thousand/uL    Lymphocytes Absolute 1 88 0 60 - 4 47 Thousands/µL    Monocytes Absolute 0 53 0 17 - 1 22 Thousand/µL    Eosinophils Absolute 0 12 0 00 - 0 61 Thousand/µL    Basophils Absolute 0 03 0 00 - 0 10 Thousands/µL   Comprehensive metabolic panel    Collection Time: 12/06/18 12:18 PM   Result Value Ref Range    Sodium 138 136 - 145 mmol/L    Potassium 3 8 3 5 - 5 3 mmol/L    Chloride 99 (L) 100 - 108 mmol/L    CO2 30 21 - 32 mmol/L    ANION GAP 9 4 - 13 mmol/L    BUN 19 5 - 25 mg/dL    Creatinine 0 92 0 60 - 1 30 mg/dL    Glucose, Fasting 88 65 - 99 mg/dL    Calcium 9 6 8 3 - 10 1 mg/dL    AST 15 5 - 45 U/L    ALT 25 12 - 78 U/L    Alkaline Phosphatase 51 46 - 116 U/L    Total Protein 7 8 6 4 - 8 2 g/dL    Albumin 4 1 3 5 - 5 0 g/dL    Total Bilirubin 0 40 0 20 - 1 00 mg/dL    eGFR 62 ml/min/1 73sq m   Vitamin D 1,25 dihydroxy    Collection Time: 12/06/18 12:18 PM   Result Value Ref Range    Vit D, 1,25-Dihydroxy 41 8 19 9 - 79 3 pg/mL   TSH, 3rd generation with Free T4 reflex    Collection Time: 12/06/18 12:18 PM   Result Value Ref Range    TSH 3RD GENERATON 1 089 0 358 - 3 740 uIU/mL   Lipid panel    Collection Time: 12/06/18 12:18 PM   Result Value Ref Range    Cholesterol 244 (H) 50 - 200 mg/dL    Triglycerides 107 <=150 mg/dL    HDL, Direct 67 (H) 40 - 60 mg/dL    LDL Calculated 156 (H) 0 - 100 mg/dL    Non-HDL-Chol (CHOL-HDL) 177 mg/dl       Assessment/Plan:    Benign essential hypertension  Well controlled on zestoretic  Refill is not necessary at this time  Flu vaccine need  High-dose flu vaccine provided in the office    Hyperlipidemia  Worsening hyperlipidemia  This is due to dietary indiscretion  Patient does not know she will be able to change her diet  She will try to make some effort  Recommended over-the-counter fish oil supplementation on a daily basis  Repeat lipid profile in 6 months    Insomnia  Element of shift work sleep disorder as the patient is working nights and trying to sleep in the day and other times working during the day and trying to sleep at night  Recommended over-the-counter melatonin    Medicare annual wellness visit, subsequent  Subsequent annual wellness visit completed    Vitamin D deficiency  Continue on vitamin-D supplementation  Check vitamin-D level in 6          Problem List Items Addressed This Visit     Benign essential hypertension     Well controlled on zestoretic  Refill is not necessary at this time  Hyperlipidemia     Worsening hyperlipidemia  This is due to dietary indiscretion  Patient does not know she will be able to change her diet  She will try to make some effort  Recommended over-the-counter fish oil supplementation on a daily basis    Repeat lipid profile in 6 months         Relevant Orders    Lipid panel    Comprehensive metabolic panel    Insomnia     Element of shift work sleep disorder as the patient is working nights and trying to sleep in the day and other times working during the day and trying to sleep at night  Recommended over-the-counter melatonin         Vitamin D deficiency     Continue on vitamin-D supplementation    Check vitamin-D level in 6         Relevant Orders    Vitamin D 1,25 dihydroxy    Flu vaccine need     High-dose flu vaccine provided in the office         Relevant Orders    PREFERRED: influenza vaccine, [id number], high-dose, PF 0 5 mL, for patients 65 yr+ (FLUZONE HIGH-DOSE) (Completed)    Medicare annual wellness visit, subsequent - Primary     Subsequent annual wellness visit completed

## 2018-12-13 NOTE — ASSESSMENT & PLAN NOTE
Worsening hyperlipidemia  This is due to dietary indiscretion  Patient does not know she will be able to change her diet  She will try to make some effort  Recommended over-the-counter fish oil supplementation on a daily basis    Repeat lipid profile in 6 months

## 2018-12-13 NOTE — ASSESSMENT & PLAN NOTE
Element of shift work sleep disorder as the patient is working nights and trying to sleep in the day and other times working during the day and trying to sleep at night    Recommended over-the-counter melatonin

## 2019-01-15 ENCOUNTER — OFFICE VISIT (OUTPATIENT)
Dept: FAMILY MEDICINE CLINIC | Facility: CLINIC | Age: 74
End: 2019-01-15
Payer: MEDICARE

## 2019-01-15 ENCOUNTER — TELEPHONE (OUTPATIENT)
Dept: FAMILY MEDICINE CLINIC | Facility: CLINIC | Age: 74
End: 2019-01-15

## 2019-01-15 VITALS
BODY MASS INDEX: 24.23 KG/M2 | DIASTOLIC BLOOD PRESSURE: 62 MMHG | WEIGHT: 150.8 LBS | RESPIRATION RATE: 18 BRPM | HEART RATE: 78 BPM | TEMPERATURE: 98.2 F | OXYGEN SATURATION: 98 % | HEIGHT: 66 IN | SYSTOLIC BLOOD PRESSURE: 100 MMHG

## 2019-01-15 DIAGNOSIS — J00 ACUTE NASOPHARYNGITIS: Primary | ICD-10-CM

## 2019-01-15 PROCEDURE — 99213 OFFICE O/P EST LOW 20 MIN: CPT | Performed by: PHYSICIAN ASSISTANT

## 2019-01-15 RX ORDER — FLUTICASONE PROPIONATE 50 MCG
2 SPRAY, SUSPENSION (ML) NASAL DAILY
Qty: 1 BOTTLE | Refills: 0 | Status: SHIPPED | OUTPATIENT
Start: 2019-01-15 | End: 2019-07-15 | Stop reason: ALTCHOICE

## 2019-01-15 RX ORDER — AMOXICILLIN 875 MG/1
875 TABLET, COATED ORAL 2 TIMES DAILY
Qty: 14 TABLET | Refills: 0 | Status: SHIPPED | OUTPATIENT
Start: 2019-01-15 | End: 2019-01-22

## 2019-01-15 NOTE — PROGRESS NOTES
Assessment/Plan:     Diagnoses and all orders for this visit:    Acute nasopharyngitis  Discussed possible allergic etiology due to sneezing, itchy nose and runny eyes  - advised to continue OTC supportive care with Tylenol/Motrin, Mucinex and saline gargle   - encouraged rest and fluid intake   - educated Pt that cough can take 10-14 days total to resolve  - Amoxicillin due do the patients age   - directed to return if fever over 102, symptoms persist for 14 days, thick productive cough  - Patient will call back in 4 days if symptoms persist   -     fluticasone (FLONASE) 50 mcg/act nasal spray; 2 sprays into each nostril daily  -     amoxicillin (AMOXIL) 875 mg tablet; Take 1 tablet (875 mg total) by mouth 2 (two) times a day for 7 days          Subjective:    Patient ID: Mary Sampson is a 68 y o  female  Pt is presenting today for Productive Cough, runny nose, itchy eye and sneezing for 5 days  Started with sore throat and lymph nodes enlarged but improved a few days ago  No sinus, ear pressure  Taking theraflu which provides moderate short term relief  No sick contacts at home  Cough   Associated symptoms include nasal congestion, postnasal drip and rhinorrhea  Pertinent negatives include no ear congestion, ear pain, fever, sore throat or shortness of breath  There is no history of asthma or COPD  The following portions of the patient's history were reviewed and updated as appropriate: allergies, current medications, past social history and problem list     Review of Systems   Constitutional: Negative for fever  HENT: Positive for postnasal drip and rhinorrhea  Negative for ear pain and sore throat  Respiratory: Positive for cough  Negative for shortness of breath            Objective:  /62   Pulse 78   Temp 98 2 °F (36 8 °C)   Resp 18   Ht 5' 6" (1 676 m)   Wt 68 4 kg (150 lb 12 8 oz)   SpO2 98%   BMI 24 34 kg/m²      Physical Exam   Constitutional: She is oriented to person, place, and time  She appears well-developed and well-nourished  No distress  HENT:   Head: Normocephalic and atraumatic  Right Ear: Tympanic membrane, external ear and ear canal normal    Left Ear: Tympanic membrane, external ear and ear canal normal    Mouth/Throat: Oropharynx is clear and moist  No oropharyngeal exudate  Eyes: Pupils are equal, round, and reactive to light  Neck: Normal range of motion  Neck supple  Cardiovascular: Normal rate, regular rhythm, normal heart sounds and intact distal pulses  Exam reveals no gallop and no friction rub  No murmur heard  Pulmonary/Chest: Effort normal and breath sounds normal  No respiratory distress  She has no wheezes  She has no rales  Lymphadenopathy:     She has no cervical adenopathy  Neurological: She is alert and oriented to person, place, and time  Skin: She is not diaphoretic

## 2019-07-10 ENCOUNTER — APPOINTMENT (OUTPATIENT)
Dept: LAB | Facility: CLINIC | Age: 74
End: 2019-07-10
Payer: MEDICARE

## 2019-07-10 DIAGNOSIS — E78.2 MIXED HYPERLIPIDEMIA: ICD-10-CM

## 2019-07-10 DIAGNOSIS — E55.9 VITAMIN D DEFICIENCY: ICD-10-CM

## 2019-07-10 LAB
ALBUMIN SERPL BCP-MCNC: 3.9 G/DL (ref 3.5–5)
ALP SERPL-CCNC: 52 U/L (ref 46–116)
ALT SERPL W P-5'-P-CCNC: 19 U/L (ref 12–78)
ANION GAP SERPL CALCULATED.3IONS-SCNC: 7 MMOL/L (ref 4–13)
AST SERPL W P-5'-P-CCNC: 16 U/L (ref 5–45)
BILIRUB SERPL-MCNC: 0.4 MG/DL (ref 0.2–1)
BUN SERPL-MCNC: 16 MG/DL (ref 5–25)
CALCIUM SERPL-MCNC: 9.5 MG/DL (ref 8.3–10.1)
CHLORIDE SERPL-SCNC: 99 MMOL/L (ref 100–108)
CHOLEST SERPL-MCNC: 247 MG/DL (ref 50–200)
CO2 SERPL-SCNC: 31 MMOL/L (ref 21–32)
CREAT SERPL-MCNC: 0.88 MG/DL (ref 0.6–1.3)
GFR SERPL CREATININE-BSD FRML MDRD: 65 ML/MIN/1.73SQ M
GLUCOSE P FAST SERPL-MCNC: 98 MG/DL (ref 65–99)
HDLC SERPL-MCNC: 66 MG/DL (ref 40–60)
LDLC SERPL CALC-MCNC: 164 MG/DL (ref 0–100)
NONHDLC SERPL-MCNC: 181 MG/DL
POTASSIUM SERPL-SCNC: 4.2 MMOL/L (ref 3.5–5.3)
PROT SERPL-MCNC: 7.5 G/DL (ref 6.4–8.2)
SODIUM SERPL-SCNC: 137 MMOL/L (ref 136–145)
TRIGL SERPL-MCNC: 85 MG/DL

## 2019-07-10 PROCEDURE — 36415 COLL VENOUS BLD VENIPUNCTURE: CPT

## 2019-07-10 PROCEDURE — 82652 VIT D 1 25-DIHYDROXY: CPT

## 2019-07-10 PROCEDURE — 80061 LIPID PANEL: CPT

## 2019-07-10 PROCEDURE — 80053 COMPREHEN METABOLIC PANEL: CPT

## 2019-07-12 LAB — 1,25(OH)2D3 SERPL-MCNC: 57.9 PG/ML (ref 19.9–79.3)

## 2019-07-15 ENCOUNTER — OFFICE VISIT (OUTPATIENT)
Dept: FAMILY MEDICINE CLINIC | Facility: CLINIC | Age: 74
End: 2019-07-15
Payer: MEDICARE

## 2019-07-15 VITALS
RESPIRATION RATE: 18 BRPM | WEIGHT: 157.2 LBS | HEIGHT: 66 IN | BODY MASS INDEX: 25.26 KG/M2 | TEMPERATURE: 98.6 F | OXYGEN SATURATION: 96 % | HEART RATE: 72 BPM | SYSTOLIC BLOOD PRESSURE: 110 MMHG | DIASTOLIC BLOOD PRESSURE: 70 MMHG

## 2019-07-15 DIAGNOSIS — M15.9 GENERALIZED OSTEOARTHRITIS: ICD-10-CM

## 2019-07-15 DIAGNOSIS — I10 BENIGN ESSENTIAL HYPERTENSION: Primary | ICD-10-CM

## 2019-07-15 DIAGNOSIS — Z11.59 NEED FOR HEPATITIS C SCREENING TEST: ICD-10-CM

## 2019-07-15 DIAGNOSIS — E55.9 VITAMIN D DEFICIENCY: ICD-10-CM

## 2019-07-15 DIAGNOSIS — H25.011 CORTICAL AGE-RELATED CATARACT OF RIGHT EYE: ICD-10-CM

## 2019-07-15 DIAGNOSIS — I10 ESSENTIAL HYPERTENSION: ICD-10-CM

## 2019-07-15 DIAGNOSIS — Z12.39 BREAST CANCER SCREENING: ICD-10-CM

## 2019-07-15 DIAGNOSIS — E78.2 MIXED HYPERLIPIDEMIA: ICD-10-CM

## 2019-07-15 PROCEDURE — 99215 OFFICE O/P EST HI 40 MIN: CPT | Performed by: FAMILY MEDICINE

## 2019-07-15 RX ORDER — IBUPROFEN 600 MG/1
600 TABLET ORAL EVERY 6 HOURS PRN
Qty: 60 TABLET | Refills: 4 | Status: SHIPPED | OUTPATIENT
Start: 2019-07-15 | End: 2021-08-31 | Stop reason: SDUPTHER

## 2019-07-15 RX ORDER — LISINOPRIL AND HYDROCHLOROTHIAZIDE 25; 20 MG/1; MG/1
1 TABLET ORAL DAILY
Qty: 90 TABLET | Refills: 0 | Status: SHIPPED | OUTPATIENT
Start: 2019-07-15 | End: 2019-07-15 | Stop reason: SDUPTHER

## 2019-07-15 RX ORDER — LISINOPRIL AND HYDROCHLOROTHIAZIDE 25; 20 MG/1; MG/1
1 TABLET ORAL DAILY
Qty: 90 TABLET | Refills: 1 | Status: SHIPPED | OUTPATIENT
Start: 2019-07-15 | End: 2020-04-03

## 2019-07-15 NOTE — ASSESSMENT & PLAN NOTE
Cholesterol is once again elevated  Advised on dietary recommendations to help lower her cholesterol  Patient is reluctant to go on cholesterol-lowering agents  Again recommended over-the-counter fish oil supplementation increasing her oral intake of fiber    Repeat lipid profile in 6 months

## 2019-07-15 NOTE — PROGRESS NOTES
Subjective:      Patient ID: Antonella Lux is a 68 y o  female  Patient presents for 6 month follow-up of chronic conditions including hyperlipidemia, history of hypertension  Patient does complain worsening vision in her right eye  She is legally blind in her left eye  She does see optometrist who informed her that she has an immature cataract then she should wait until her cataract progresses to have it surgically removed  Patient does have difficulty with her vision  She does drive at night  Patient does complain of numbness and tingling of the toes of both feet which is not always present  Patient does have history of DJD of the lumbar spine  Labs reviewed which showed normal CBC, CMP, TSH  Cholesterol is slightly more elevated  Patient has not really made any significant dietary changes  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  She does eat ice cream at night, pork chops and occasionally red meat  Past Medical History:   Diagnosis Date    Arthritis     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, unspecified     Last assessed: 10/17/13    Colon polyps     Depression     Hyperlipidemia     Hypertension     Lumbago     last assessed: 1/15/14    Menopause     Neuropathy     Ptosis, left eyelid     last assessed: 10/16/15    Shortness of breath     Vertigo     last assessed: 10/26/15       Family History   Problem Relation Age of Onset    Heart disease Other         cardiac disorder       Past Surgical History:   Procedure Laterality Date    BLADDER SURGERY      HYSTERECTOMY      [place] COLONOSCOPY FLX DX W/COLLJ SPEC WHEN PFRMD N/A 4/11/2016    Procedure: COLONOSCOPY;  Surgeon: Sasha French MD;  Location: [facility]; Service: Gastroenterology        reports that she has never smoked  She has never used smokeless tobacco  She reports that she drinks alcohol  She reports that she does not use drugs        Current Outpatient Medications:     acetaminophen (TYLENOL) 500 mg tablet, Take 500 mg by mouth every 6 (six) hours as needed for mild pain , Disp: , Rfl:     aspirin 81 MG tablet, Take 81 mg by mouth daily  , Disp: , Rfl:     Cholecalciferol (VITAMIN D3) 5000 units CAPS, Take 2,000 Units by mouth every other day  , Disp: , Rfl:     ibuprofen (MOTRIN) 600 mg tablet, Take 1 tablet (600 mg total) by mouth every 6 (six) hours as needed for mild pain, Disp: 60 tablet, Rfl: 4    lisinopril-hydrochlorothiazide (PRINZIDE,ZESTORETIC) 20-25 MG per tablet, Take 1 tablet by mouth daily, Disp: 90 tablet, Rfl: 1    The following portions of the patient's history were reviewed and updated as appropriate: allergies, current medications, past family history, past medical history, past social history, past surgical history and problem list     Review of Systems   Constitutional: Negative  HENT: Negative  Eyes: Positive for visual disturbance (Worsening vision of the right eye)  Respiratory: Negative  Cardiovascular: Negative  Gastrointestinal: Negative  Endocrine: Negative  Genitourinary: Negative  Musculoskeletal: Negative  Skin: Negative  Allergic/Immunologic: Negative  Neurological: Positive for numbness ( toes of bilateral feet intermittently)  Negative for weakness  Hematological: Negative  Psychiatric/Behavioral: Negative  Objective:    /70   Pulse 72   Temp 98 6 °F (37 °C)   Resp 18   Ht 5' 6" (1 676 m)   Wt 71 3 kg (157 lb 3 2 oz)   SpO2 96%   BMI 25 37 kg/m²      Physical Exam   Constitutional: She is oriented to person, place, and time  She appears well-developed and well-nourished  HENT:   Head: Normocephalic and atraumatic  Eyes: Pupils are equal, round, and reactive to light  Conjunctivae are normal    Mild ptosis of the left eye   Neck: Normal range of motion  Neck supple  Cardiovascular: Normal rate and regular rhythm  Pulmonary/Chest: Effort normal and breath sounds normal    Abdominal: Soft   Bowel sounds are normal    Musculoskeletal: She exhibits tenderness (Lumbar spine)  She exhibits no edema  Neurological: She is alert and oriented to person, place, and time  She displays normal reflexes  No cranial nerve deficit or sensory deficit  She exhibits normal muscle tone  Psychiatric: She has a normal mood and affect  Her behavior is normal  Judgment and thought content normal    Nursing note and vitals reviewed  Recent Results (from the past 1008 hour(s))   Vitamin D 1,25 dihydroxy    Collection Time: 07/10/19 10:09 AM   Result Value Ref Range    Vit D, 1,25-Dihydroxy 57 9 19 9 - 79 3 pg/mL   Lipid panel    Collection Time: 07/10/19 10:09 AM   Result Value Ref Range    Cholesterol 247 (H) 50 - 200 mg/dL    Triglycerides 85 <=150 mg/dL    HDL, Direct 66 (H) 40 - 60 mg/dL    LDL Calculated 164 (H) 0 - 100 mg/dL    Non-HDL-Chol (CHOL-HDL) 181 mg/dl   Comprehensive metabolic panel    Collection Time: 07/10/19 10:09 AM   Result Value Ref Range    Sodium 137 136 - 145 mmol/L    Potassium 4 2 3 5 - 5 3 mmol/L    Chloride 99 (L) 100 - 108 mmol/L    CO2 31 21 - 32 mmol/L    ANION GAP 7 4 - 13 mmol/L    BUN 16 5 - 25 mg/dL    Creatinine 0 88 0 60 - 1 30 mg/dL    Glucose, Fasting 98 65 - 99 mg/dL    Calcium 9 5 8 3 - 10 1 mg/dL    AST 16 5 - 45 U/L    ALT 19 12 - 78 U/L    Alkaline Phosphatase 52 46 - 116 U/L    Total Protein 7 5 6 4 - 8 2 g/dL    Albumin 3 9 3 5 - 5 0 g/dL    Total Bilirubin 0 40 0 20 - 1 00 mg/dL    eGFR 65 ml/min/1 73sq m       Assessment/Plan:    Benign essential hypertension  Well controlled on Zestoretic  Continue same  Refill provided  Re-evaluate in 6 months    Polyp of sigmoid colon  Patient had screening colonoscopy repeated in 2016  She did not have any abnormalities aside from a tortuous colon noted  She was advised to have repeat colonoscopy in 3 years  She had no pathology  No biopsies  No polyps  She does have prior history of a colon polyp that was completely benign      Generalized osteoarthritis  Refill provided of p r n  Ibuprofen    Vitamin D deficiency  Normalized vitamin-D level on over-the-counter vitamin-D supplementation  Continue same  Repeat vitamin-D level in 6 months    Need for hepatitis C screening test  Hepatitis C antibody testing ordered    Hyperlipidemia  Cholesterol is once again elevated  Advised on dietary recommendations to help lower her cholesterol  Patient is reluctant to go on cholesterol-lowering agents  Again recommended over-the-counter fish oil supplementation increasing her oral intake of fiber  Repeat lipid profile in 6 months    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  Recommended the patient see ophthalmologist for 2nd opinion  It would be understandable to wait for her cataract to mature however this is the only I that she actually has decent vision from  To wait for a cataract much shore would essentially equate her to being visually blind  Patient does drive on her own  Breast cancer screening  Overdue for screening mammogram   Order provided  Problem List Items Addressed This Visit        Cardiovascular and Mediastinum    Benign essential hypertension - Primary     Well controlled on Zestoretic  Continue same  Refill provided  Re-evaluate in 6 months         Relevant Medications    lisinopril-hydrochlorothiazide (PRINZIDE,ZESTORETIC) 20-25 MG per tablet    Other Relevant Orders    CBC and differential    Comprehensive metabolic panel       Musculoskeletal and Integument    Generalized osteoarthritis     Refill provided of p r n  Ibuprofen         Relevant Medications    ibuprofen (MOTRIN) 600 mg tablet       Other    Hyperlipidemia     Cholesterol is once again elevated  Advised on dietary recommendations to help lower her cholesterol  Patient is reluctant to go on cholesterol-lowering agents  Again recommended over-the-counter fish oil supplementation increasing her oral intake of fiber    Repeat lipid profile in 6 months Relevant Orders    Lipid panel    Vitamin D deficiency     Normalized vitamin-D level on over-the-counter vitamin-D supplementation  Continue same  Repeat vitamin-D level in 6 months         Relevant Orders    Vitamin D 25 hydroxy    Breast cancer screening     Overdue for screening mammogram   Order provided  Relevant Orders    Mammo screening bilateral w cad    Need for hepatitis C screening test     Hepatitis C antibody testing ordered         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     Recommended the patient see ophthalmologist for 2nd opinion  It would be understandable to wait for her cataract to mature however this is the only I that she actually has decent vision from  To wait for a cataract much shore would essentially equate her to being visually blind  Patient does drive on her own  Relevant Orders    Ambulatory referral to Ophthalmology      Other Visit Diagnoses     Essential hypertension        Relevant Medications    lisinopril-hydrochlorothiazide (PRINZIDE,ZESTORETIC) 20-25 MG per tablet          BMI Counseling: Body mass index is 25 37 kg/m²  Discussed the patient's BMI with her  The BMI is above average  BMI counseling and education was provided to the patient  Nutrition recommendations include reducing intake of saturated fat and trans fat and reducing intake of cholesterol  I have spent 42 minutes with Patient  today in which greater than 50% of this time was spent in counseling/coordination of care regarding Diagnostic results, Prognosis, Risks and benefits of tx options, Intructions for management, Patient and family education, Importance of tx compliance, Risk factor reductions and Impressions

## 2019-07-15 NOTE — ASSESSMENT & PLAN NOTE
Recommended the patient see ophthalmologist for 2nd opinion  It would be understandable to wait for her cataract to mature however this is the only I that she actually has decent vision from  To wait for a cataract much shore would essentially equate her to being visually blind  Patient does drive on her own

## 2019-07-15 NOTE — ASSESSMENT & PLAN NOTE
Well controlled on Zestoretic  Continue same  Refill provided    Re-evaluate in 6 months [phone]

## 2019-07-15 NOTE — ASSESSMENT & PLAN NOTE
Patient had screening colonoscopy repeated in 2016  She did not have any abnormalities aside from a tortuous colon noted  She was advised to have repeat colonoscopy in 3 years  She had no pathology  No biopsies  No polyps  She does have prior history of a colon polyp that was completely benign

## 2019-07-15 NOTE — ASSESSMENT & PLAN NOTE
Normalized vitamin-D level on over-the-counter vitamin-D supplementation  Continue same    Repeat vitamin-D level in 6 months

## 2019-12-09 ENCOUNTER — APPOINTMENT (OUTPATIENT)
Dept: LAB | Facility: CLINIC | Age: 74
End: 2019-12-09
Payer: MEDICARE

## 2019-12-09 DIAGNOSIS — E55.9 VITAMIN D DEFICIENCY: ICD-10-CM

## 2019-12-09 DIAGNOSIS — E78.2 MIXED HYPERLIPIDEMIA: ICD-10-CM

## 2019-12-09 DIAGNOSIS — I10 BENIGN ESSENTIAL HYPERTENSION: ICD-10-CM

## 2019-12-09 LAB
25(OH)D3 SERPL-MCNC: 55.9 NG/ML (ref 30–100)
ALBUMIN SERPL BCP-MCNC: 3.8 G/DL (ref 3.5–5)
ALP SERPL-CCNC: 60 U/L (ref 46–116)
ALT SERPL W P-5'-P-CCNC: 14 U/L (ref 12–78)
ANION GAP SERPL CALCULATED.3IONS-SCNC: 9 MMOL/L (ref 4–13)
AST SERPL W P-5'-P-CCNC: 19 U/L (ref 5–45)
BASOPHILS # BLD AUTO: 0.06 THOUSANDS/ΜL (ref 0–0.1)
BASOPHILS NFR BLD AUTO: 1 % (ref 0–1)
BILIRUB SERPL-MCNC: 0.45 MG/DL (ref 0.2–1)
BUN SERPL-MCNC: 19 MG/DL (ref 5–25)
CALCIUM SERPL-MCNC: 9.4 MG/DL (ref 8.3–10.1)
CHLORIDE SERPL-SCNC: 99 MMOL/L (ref 100–108)
CHOLEST SERPL-MCNC: 261 MG/DL (ref 50–200)
CO2 SERPL-SCNC: 30 MMOL/L (ref 21–32)
CREAT SERPL-MCNC: 0.86 MG/DL (ref 0.6–1.3)
EOSINOPHIL # BLD AUTO: 0.24 THOUSAND/ΜL (ref 0–0.61)
EOSINOPHIL NFR BLD AUTO: 3 % (ref 0–6)
ERYTHROCYTE [DISTWIDTH] IN BLOOD BY AUTOMATED COUNT: 12.8 % (ref 11.6–15.1)
GFR SERPL CREATININE-BSD FRML MDRD: 67 ML/MIN/1.73SQ M
GLUCOSE P FAST SERPL-MCNC: 95 MG/DL (ref 65–99)
HCT VFR BLD AUTO: 38.5 % (ref 34.8–46.1)
HDLC SERPL-MCNC: 65 MG/DL
HGB BLD-MCNC: 12.5 G/DL (ref 11.5–15.4)
IMM GRANULOCYTES # BLD AUTO: 0.01 THOUSAND/UL (ref 0–0.2)
IMM GRANULOCYTES NFR BLD AUTO: 0 % (ref 0–2)
LDLC SERPL CALC-MCNC: 180 MG/DL (ref 0–100)
LYMPHOCYTES # BLD AUTO: 2.37 THOUSANDS/ΜL (ref 0.6–4.47)
LYMPHOCYTES NFR BLD AUTO: 34 % (ref 14–44)
MCH RBC QN AUTO: 32.2 PG (ref 26.8–34.3)
MCHC RBC AUTO-ENTMCNC: 32.5 G/DL (ref 31.4–37.4)
MCV RBC AUTO: 99 FL (ref 82–98)
MONOCYTES # BLD AUTO: 0.64 THOUSAND/ΜL (ref 0.17–1.22)
MONOCYTES NFR BLD AUTO: 9 % (ref 4–12)
NEUTROPHILS # BLD AUTO: 3.7 THOUSANDS/ΜL (ref 1.85–7.62)
NEUTS SEG NFR BLD AUTO: 53 % (ref 43–75)
NONHDLC SERPL-MCNC: 196 MG/DL
NRBC BLD AUTO-RTO: 0 /100 WBCS
PLATELET # BLD AUTO: 258 THOUSANDS/UL (ref 149–390)
PMV BLD AUTO: 9.4 FL (ref 8.9–12.7)
POTASSIUM SERPL-SCNC: 3.9 MMOL/L (ref 3.5–5.3)
PROT SERPL-MCNC: 7.5 G/DL (ref 6.4–8.2)
RBC # BLD AUTO: 3.88 MILLION/UL (ref 3.81–5.12)
SODIUM SERPL-SCNC: 138 MMOL/L (ref 136–145)
TRIGL SERPL-MCNC: 81 MG/DL
WBC # BLD AUTO: 7.02 THOUSAND/UL (ref 4.31–10.16)

## 2019-12-09 PROCEDURE — 85025 COMPLETE CBC W/AUTO DIFF WBC: CPT

## 2019-12-09 PROCEDURE — 80061 LIPID PANEL: CPT

## 2019-12-09 PROCEDURE — 36415 COLL VENOUS BLD VENIPUNCTURE: CPT

## 2019-12-09 PROCEDURE — 82306 VITAMIN D 25 HYDROXY: CPT

## 2019-12-09 PROCEDURE — 80053 COMPREHEN METABOLIC PANEL: CPT

## 2019-12-16 ENCOUNTER — OFFICE VISIT (OUTPATIENT)
Dept: FAMILY MEDICINE CLINIC | Facility: CLINIC | Age: 74
End: 2019-12-16
Payer: MEDICARE

## 2019-12-16 VITALS
DIASTOLIC BLOOD PRESSURE: 68 MMHG | RESPIRATION RATE: 16 BRPM | SYSTOLIC BLOOD PRESSURE: 124 MMHG | WEIGHT: 154 LBS | HEART RATE: 74 BPM | HEIGHT: 66 IN | OXYGEN SATURATION: 98 % | BODY MASS INDEX: 24.75 KG/M2

## 2019-12-16 DIAGNOSIS — E78.2 MIXED HYPERLIPIDEMIA: ICD-10-CM

## 2019-12-16 DIAGNOSIS — G47.00 INSOMNIA, UNSPECIFIED TYPE: ICD-10-CM

## 2019-12-16 DIAGNOSIS — I10 BENIGN ESSENTIAL HYPERTENSION: ICD-10-CM

## 2019-12-16 DIAGNOSIS — Z00.00 MEDICARE ANNUAL WELLNESS VISIT, SUBSEQUENT: Primary | ICD-10-CM

## 2019-12-16 DIAGNOSIS — M15.9 GENERALIZED OSTEOARTHRITIS: ICD-10-CM

## 2019-12-16 DIAGNOSIS — Z23 FLU VACCINE NEED: ICD-10-CM

## 2019-12-16 DIAGNOSIS — E55.9 VITAMIN D DEFICIENCY: ICD-10-CM

## 2019-12-16 PROCEDURE — 99214 OFFICE O/P EST MOD 30 MIN: CPT | Performed by: FAMILY MEDICINE

## 2019-12-16 PROCEDURE — G0439 PPPS, SUBSEQ VISIT: HCPCS | Performed by: FAMILY MEDICINE

## 2019-12-16 PROCEDURE — 90662 IIV NO PRSV INCREASED AG IM: CPT | Performed by: FAMILY MEDICINE

## 2019-12-16 PROCEDURE — G0008 ADMIN INFLUENZA VIRUS VAC: HCPCS | Performed by: FAMILY MEDICINE

## 2019-12-16 RX ORDER — ROSUVASTATIN CALCIUM 5 MG/1
5 TABLET, COATED ORAL DAILY
Qty: 30 TABLET | Refills: 1 | Status: SHIPPED | OUTPATIENT
Start: 2019-12-16 | End: 2019-12-18

## 2019-12-16 NOTE — PROGRESS NOTES
Assessment and Plan:     Problem List Items Addressed This Visit        Cardiovascular and Mediastinum    Benign essential hypertension       Musculoskeletal and Integument    Generalized osteoarthritis       Other    Hyperlipidemia    Insomnia    Vitamin D deficiency    Flu vaccine need    Relevant Orders    influenza vaccine, 4845-0570, high-dose, PF 0 5 mL (FLUZONE HIGH-DOSE) (Completed)    Medicare annual wellness visit, subsequent - Primary           Preventive health issues were discussed with patient, and age appropriate screening tests were ordered as noted in patient's After Visit Summary  Personalized health advice and appropriate referrals for health education or preventive services given if needed, as noted in patient's After Visit Summary       History of Present Illness:     Patient presents for Medicare Annual Wellness visit    Patient Care Team:  Carl Sandoval DO as PCP - MD Robb Chavarria MD Nolen Bloomer, MD as Endoscopist     Problem List:     Patient Active Problem List   Diagnosis    Benign essential hypertension    Benign neoplasm of bone    Disc degeneration, lumbar    Facial nerve disorder    Generalized osteoarthritis    Hyperlipidemia    Insomnia    Symptomatic menopausal or female climacteric states    Polyp of sigmoid colon    Primary osteoarthritis of right knee    Vitamin D deficiency    Breast cancer screening    Flu vaccine need    Medicare annual wellness visit, subsequent    Need for hepatitis C screening test    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      Past Medical and Surgical History:     Past Medical History:   Diagnosis Date    Arthritis     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, unspecified     Last assessed: 10/17/13    Colon polyps     Depression     Hyperlipidemia     Hypertension     Lumbago     last assessed: 1/15/14    Menopause     Neuropathy     Ptosis, left eyelid     last assessed: 10/16/15    Shortness of breath     Vertigo     last assessed: 10/26/15     Past Surgical History:   Procedure Laterality Date    BLADDER SURGERY      HYSTERECTOMY      [place] COLONOSCOPY FLX DX W/COLLJ SPEC WHEN PFRMD N/A 4/11/2016    Procedure: COLONOSCOPY;  Surgeon: Estefanía Gamboa MD;  Location: [facility]; Service: Gastroenterology      Family History:     Family History   Problem Relation Age of Onset    Heart disease Other         cardiac disorder      Social History:     Social History     Socioeconomic History    Marital status:      Spouse name: None    Number of children: None    Years of education: None    Highest education level: None   Occupational History    None   Social Needs    Financial resource strain: None    Food insecurity:     Worry: None     Inability: None    Transportation needs:     Medical: None     Non-medical: None   Tobacco Use    Smoking status: Never Smoker    Smokeless tobacco: Never Used   Substance and Sexual Activity    Alcohol use: Yes     Comment: social, rarely, per [employer]    Drug use: No    Sexual activity: None   Lifestyle    Physical activity:     Days per week: None     Minutes per session: None    Stress: None   Relationships    Social connections:     Talks on phone: None     Gets together: None     Attends [employer] service: None     Active member of club or organization: None     Attends meetings of clubs or organizations: None     Relationship status: None    Intimate partner violence:     Fear of current or ex partner: None     Emotionally abused: None     Physically abused: None     Forced sexual activity: None   Other Topics Concern    None   Social History Narrative    Caffeine use    , per [employer]       Medications and Allergies:     Current Outpatient Medications   Medication Sig Dispense Refill    acetaminophen (TYLENOL) 500 mg tablet Take 500 mg by mouth every 6 (six) hours as needed for mild pain        aspirin 81 MG tablet Take 81 mg by mouth daily   Cholecalciferol (VITAMIN D3) 5000 units CAPS Take 2,000 Units by mouth every other day        ibuprofen (MOTRIN) 600 mg tablet Take 1 tablet (600 mg total) by mouth every 6 (six) hours as needed for mild pain 60 tablet 4    lisinopril-hydrochlorothiazide (PRINZIDE,ZESTORETIC) 20-25 MG per tablet Take 1 tablet by mouth daily 90 tablet 1     No current facility-administered medications for this visit  Allergies   Allergen Reactions    Benzoyl Peroxide Rash      Immunizations:     Immunization History   Administered Date(s) Administered    Influenza Split High Dose Preservative Free IM 09/24/2013, 10/08/2014, 10/26/2015, 12/01/2016, 12/12/2017    Influenza, high dose seasonal 0 5 mL 12/13/2018, 12/16/2019    Pneumococcal Conjugate 13-Valent 05/02/2016    Pneumococcal Conjugate PCV 7 05/09/2011    Pneumococcal Polysaccharide PPV23 05/09/2011      Health Maintenance:         Topic Date Due    MAMMOGRAM  1945    CRC Screening: Colonoscopy  07/15/2020 (Originally 4/11/2019)    Hepatitis C Screening  12/16/2020 (Originally 1945)     There are no preventive care reminders to display for this patient  Medicare Health Risk Assessment:     /68   Pulse 74   Resp 16   Ht 5' 6" (1 676 m)   Wt 69 9 kg (154 lb)   SpO2 98%   BMI 24 86 kg/m²      Betty Chang is here for her Subsequent Wellness visit  Health Risk Assessment:   Patient rates overall health as good  Patient feels that their physical health rating is slightly better  Eyesight was rated as slightly worse  Hearing was rated as same  Patient feels that their emotional and mental health rating is slightly better  Pain experienced in the last 7 days has been some  Patient's pain rating has been 3/10  Patient states that she has experienced no weight loss or gain in last 6 months  Depression Screening:   PHQ-2 Score: 0      Fall Risk Screening:    In the past year, patient has experienced: no history of falling in past year      Urinary Incontinence Screening:   Patient has not leaked urine accidently in the last six months  Home Safety:  Patient does not have trouble with stairs inside or outside of their home  Patient has working smoke alarms and has working carbon monoxide detector  Home safety hazards include: none  Nutrition:   Current diet is Regular  Medications:   Patient is currently taking over-the-counter supplements  OTC medications include: see medication list  Patient is able to manage medications  Activities of Daily Living (ADLs)/Instrumental Activities of Daily Living (IADLs):   Walk and transfer into and out of bed and chair?: Yes  Dress and groom yourself?: Yes    Bathe or shower yourself?: Yes    Feed yourself? Yes  Do your laundry/housekeeping?: Yes  Manage your money, pay your bills and track your expenses?: Yes  Make your own meals?: Yes    Do your own shopping?: Yes    Previous Hospitalizations:   Any hospitalizations or ED visits within the last 12 months?: No      Advance Care Planning:   Living will: Yes    Durable POA for healthcare:  Yes    Advanced directive: Yes    Advanced directive counseling given: No    Five wishes given: No    Patient declined ACP directive: No    End of Life Decisions reviewed with patient: Yes    Provider agrees with end of life decisions: Yes      PREVENTIVE SCREENINGS      Cardiovascular Screening:    General: Screening Not Indicated and History Lipid Disorder      Diabetes Screening:     General: Screening Current      Colorectal Cancer Screening:     General: Screening Current      Breast Cancer Screening:     General: Risks and Benefits Discussed    Due for: Mammogram        Cervical Cancer Screening:    General: Screening Not Indicated      Osteoporosis Screening:    General: Screening Current      Abdominal Aortic Aneurysm (AAA) Screening:        General: Screening Not Indicated      Lung Cancer Screening:     General: Screening Not Indicated      Hepatitis C Screening:    General: Screening Current      Danae Loyd DO

## 2019-12-16 NOTE — PATIENT INSTRUCTIONS
Medicare Preventive Visit Patient Instructions  Thank you for completing your Welcome to Medicare Visit or Medicare Annual Wellness Visit today  Your next wellness visit will be due in one year (12/16/2020)  The screening/preventive services that you may require over the next 5-10 years are detailed below  Some tests may not apply to you based off risk factors and/or age  Screening tests ordered at today's visit but not completed yet may show as past due  Also, please note that scanned in results may not display below  Preventive Screenings:  Service Recommendations Previous Testing/Comments   Colorectal Cancer Screening  * Colonoscopy    * Fecal Occult Blood Test (FOBT)/Fecal Immunochemical Test (FIT)  * Fecal DNA/Cologuard Test  * Flexible Sigmoidoscopy Age: 54-65 years old   Colonoscopy: every 10 years (may be performed more frequently if at higher risk)  OR  FOBT/FIT: every 1 year  OR  Cologuard: every 3 years  OR  Sigmoidoscopy: every 5 years  Screening may be recommended earlier than age 48 if at higher risk for colorectal cancer  Also, an individualized decision between you and your healthcare provider will decide whether screening between the ages of 74-80 would be appropriate  Colonoscopy: 04/11/2016  FOBT/FIT: Not on file  Cologuard: Not on file  Sigmoidoscopy: Not on file    Screening Current     Breast Cancer Screening Age: 36 years old  Frequency: every 1-2 years  Not required if history of left and right mastectomy Mammogram: Not on file       Cervical Cancer Screening Between the ages of 21-29, pap smear recommended once every 3 years  Between the ages of 33-67, can perform pap smear with HPV co-testing every 5 years     Recommendations may differ for women with a history of total hysterectomy, cervical cancer, or abnormal pap smears in past  Pap Smear: Not on file    Screening Not Indicated   Hepatitis C Screening Once for adults born between 1945 and 1965  More frequently in patients at high risk for Hepatitis C Hep C Antibody: Not on file    Screening Current   Diabetes Screening 1-2 times per year if you're at risk for diabetes or have pre-diabetes Fasting glucose: 95 mg/dL   A1C: No results in last 5 years    Screening Current   Cholesterol Screening Once every 5 years if you don't have a lipid disorder  May order more often based on risk factors  Lipid panel: 12/09/2019    Screening Not Indicated  History Lipid Disorder     Other Preventive Screenings Covered by Medicare:  1  Abdominal Aortic Aneurysm (AAA) Screening: covered once if your at risk  You're considered to be at risk if you have a family history of AAA  2  Lung Cancer Screening: covers low dose CT scan once per year if you meet all of the following conditions: (1) Age 50-69; (2) No signs or symptoms of lung cancer; (3) Current smoker or have quit smoking within the last 15 years; (4) You have a tobacco smoking history of at least 30 pack years (packs per day multiplied by number of years you smoked); (5) You get a written order from a healthcare provider  3  Glaucoma Screening: covered annually if you're considered high risk: (1) You have diabetes OR (2) Family history of glaucoma OR (3)  aged 48 and older OR (3)  American aged 72 and older  3  Osteoporosis Screening: covered every 2 years if you meet one of the following conditions: (1) You're estrogen deficient and at risk for osteoporosis based off medical history and other findings; (2) Have a vertebral abnormality; (3) On glucocorticoid therapy for more than 3 months; (4) Have primary hyperparathyroidism; (5) On osteoporosis medications and need to assess response to drug therapy  · Last bone density test (DXA Scan): Not on file  5  HIV Screening: covered annually if you're between the age of 12-76  Also covered annually if you are younger than 13 and older than 72 with risk factors for HIV infection   For pregnant patients, it is covered up to 3 times per pregnancy  Immunizations:  Immunization Recommendations   Influenza Vaccine Annual influenza vaccination during flu season is recommended for all persons aged >= 6 months who do not have contraindications   Pneumococcal Vaccine (Prevnar and Pneumovax)  * Prevnar = PCV13  * Pneumovax = PPSV23   Adults 25-60 years old: 1-3 doses may be recommended based on certain risk factors  Adults 72 years old: Prevnar (PCV13) vaccine recommended followed by Pneumovax (PPSV23) vaccine  If already received PPSV23 since turning 65, then PCV13 recommended at least one year after PPSV23 dose  Hepatitis B Vaccine 3 dose series if at intermediate or high risk (ex: diabetes, end stage renal disease, liver disease)   Tetanus (Td) Vaccine - COST NOT COVERED BY MEDICARE PART B Following completion of primary series, a booster dose should be given every 10 years to maintain immunity against tetanus  Td may also be given as tetanus wound prophylaxis  Tdap Vaccine - COST NOT COVERED BY MEDICARE PART B Recommended at least once for all adults  For pregnant patients, recommended with each pregnancy  Shingles Vaccine (Shingrix) - COST NOT COVERED BY MEDICARE PART B  2 shot series recommended in those aged 48 and above     Health Maintenance Due:      Topic Date Due    MAMMOGRAM  1945    CRC Screening: Colonoscopy  07/15/2020 (Originally 4/11/2019)    Hepatitis C Screening  12/16/2020 (Originally 1945)     Immunizations Due:  There are no preventive care reminders to display for this patient  Advance Directives   What are advance directives? Advance directives are legal documents that state your wishes and plans for medical care  These plans are made ahead of time in case you lose your ability to make decisions for yourself  Advance directives can apply to any medical decision, such as the treatments you want, and if you want to donate organs  What are the types of advance directives?   There are many types of advance directives, and each state has rules about how to use them  You may choose a combination of any of the following:  · Living will: This is a written record of the treatment you want  You can also choose which treatments you do not want, which to limit, and which to stop at a certain time  This includes surgery, medicine, IV fluid, and tube feedings  · Durable power of  for healthcare [facility]): This is a written record that states who you want to make healthcare choices for you when you are unable to make them for yourself  This person, called a proxy, is usually a family member or a friend  You may choose more than 1 proxy  · Do not resuscitate (DNR) order:  A DNR order is used in case your heart stops beating or you stop breathing  It is a request not to have certain forms of treatment, such as CPR  A DNR order may be included in other types of advance directives  · Medical directive: This covers the care that you want if you are in a coma, near death, or unable to make decisions for yourself  You can list the treatments you want for each condition  Treatment may include pain medicine, surgery, blood transfusions, dialysis, IV or tube feedings, and a ventilator (breathing machine)  · Values history: This document has questions about your views, beliefs, and how you feel and think about life  This information can help others choose the care that you would choose  Why are advance directives important? An advance directive helps you control your care  Although spoken wishes may be used, it is better to have your wishes written down  Spoken wishes can be misunderstood, or not followed  Treatments may be given even if you do not want them  An advance directive may make it easier for your family to make difficult choices about your care  © Copyright [employer] 2018 Information is for End User's use only and may not be sold, redistributed or otherwise used for commercial purposes   All illustrations and images included in CareNotes® are the copyrighted property of A [employer] A M , [employer]  or [employer] Land[employer]

## 2019-12-16 NOTE — ASSESSMENT & PLAN NOTE
Cholesterol is elevated even higher  Patient was previously on statin therapy back in 2013  She had wanted to get off of statin therapy  At this time she has difficulty regulating her diet  Will place the patient on low-dose Crestor 5 mg once daily  So long as the patient is able to tolerate Crestor then she will contact the office and I will provide a 3 month refill    Repeat lipid profile in 4 months with the patient coming into the office in 6 months

## 2019-12-16 NOTE — ASSESSMENT & PLAN NOTE
Well controlled on lisinopril/hydrochlorothiazide  Continue same  Refill necessary at this time    She will contact the office 1 refills needed

## 2019-12-16 NOTE — ASSESSMENT & PLAN NOTE
Vitamin-D level is improving  Continue on over-the-counter vitamin-D supplementation    Repeat vitamin-D level in 6 months

## 2019-12-16 NOTE — PROGRESS NOTES
Subjective:      Patient ID: Molly Santana is a 76 y o  female  49-year-old female presents for subsequent annual wellness visit and follow-up of chronic conditions including hypertension, hyperlipidemia  Patient has had diet-controlled hyperlipidemia in the past   Patient does admit to some dietary indiscretion  She lives alone and prepares her own meals  She does work as a home health aide  Often time she cannot make fresh foods during the day and by the time she gets home she is tired at night  She will eat ice cream   Total cholesterol 260 with an LDL of 180  Patient generally feels well  Remainder of labs reviewed which showed normal CBC, CMP, TSH and improving vitamin-D level  Past Medical History:   Diagnosis Date    Arthritis     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, unspecified     Last assessed: 10/17/13    Colon polyps     Depression     Hyperlipidemia     Hypertension     Lumbago     last assessed: 1/15/14    Menopause     Neuropathy     Ptosis, left eyelid     last assessed: 10/16/15    Shortness of breath     Vertigo     last assessed: 10/26/15       Family History   Problem Relation Age of Onset    Heart disease Other         cardiac disorder       Past Surgical History:   Procedure Laterality Date    BLADDER SURGERY      HYSTERECTOMY      MS COLONOSCOPY FLX DX W/COLLJ SPEC WHEN PFRMD N/A 4/11/2016    Procedure: COLONOSCOPY;  Surgeon: Kerri Patterson MD;  [facility]; Service: Gastroenterology        reports that she has never smoked  She has never used smokeless tobacco  She reports that she drinks alcohol  She reports that she does not use drugs  Current Outpatient Medications:     acetaminophen (TYLENOL) 500 mg tablet, Take 500 mg by mouth every 6 (six) hours as needed for mild pain , Disp: , Rfl:     aspirin 81 MG tablet, Take 81 mg by mouth daily  , Disp: , Rfl:     Cholecalciferol (VITAMIN D3) 5000 units CAPS, Take 2,000 Units by mouth every other day  , Disp: , Rfl:     ibuprofen (MOTRIN) 600 mg tablet, Take 1 tablet (600 mg total) by mouth every 6 (six) hours as needed for mild pain, Disp: 60 tablet, Rfl: 4    lisinopril-hydrochlorothiazide (PRINZIDE,ZESTORETIC) 20-25 MG per tablet, Take 1 tablet by mouth daily, Disp: 90 tablet, Rfl: 1    The following portions of the patient's history were reviewed and updated as appropriate: allergies, current medications, past family history, past medical history, past social history, past surgical history and problem list     Review of Systems   Constitutional: Negative  HENT: Negative  Eyes: Positive for visual disturbance (Worsening vision of the right eye)  Respiratory: Negative  Cardiovascular: Negative  Gastrointestinal: Negative  Endocrine: Negative  Genitourinary: Negative  Musculoskeletal: Negative  Skin: Negative  Allergic/Immunologic: Negative  Neurological: Negative  Hematological: Negative  Psychiatric/Behavioral: Negative  Objective:    /68   Pulse 74   Resp 16   Ht 5' 6" (1 676 m)   Wt 69 9 kg (154 lb)   SpO2 98%   BMI 24 86 kg/m²      Physical Exam   Constitutional: She is oriented to person, place, and time  She appears well-developed and well-nourished  HENT:   Head: Normocephalic and atraumatic  Eyes: Pupils are equal, round, and reactive to light  Conjunctivae are normal    Ptosis of the left eyelid   Neck: Normal range of motion  Neck supple  Cardiovascular: Normal rate, regular rhythm and normal heart sounds  Pulmonary/Chest: Effort normal and breath sounds normal    Abdominal: Soft  Bowel sounds are normal    Musculoskeletal: Normal range of motion  She exhibits no edema or deformity  Neurological: She is alert and oriented to person, place, and time  She has normal reflexes  Psychiatric: She has a normal mood and affect   Her behavior is normal  Judgment and thought content normal    Nursing note and vitals reviewed          Recent Results (from the past 1008 hour(s))   Vitamin D 25 hydroxy    Collection Time: 12/09/19 11:19 AM   Result Value Ref Range    Vit D, 25-Hydroxy 55 9 30 0 - 100 0 ng/mL   CBC and differential    Collection Time: 12/09/19 11:19 AM   Result Value Ref Range    WBC 7 02 4 31 - 10 16 Thousand/uL    RBC 3 88 3 81 - 5 12 Million/uL    Hemoglobin 12 5 11 5 - 15 4 g/dL    Hematocrit 38 5 34 8 - 46 1 %    MCV 99 (H) 82 - 98 fL    MCH 32 2 26 8 - 34 3 pg    MCHC 32 5 31 4 - 37 4 g/dL    RDW 12 8 11 6 - 15 1 %    MPV 9 4 8 9 - 12 7 fL    Platelets 023 167 - 898 Thousands/uL    nRBC 0 /100 WBCs    Neutrophils Relative 53 43 - 75 %    Immat GRANS % 0 0 - 2 %    Lymphocytes Relative 34 14 - 44 %    Monocytes Relative 9 4 - 12 %    Eosinophils Relative 3 0 - 6 %    Basophils Relative 1 0 - 1 %    Neutrophils Absolute 3 70 1 85 - 7 62 Thousands/µL    Immature Grans Absolute 0 01 0 00 - 0 20 Thousand/uL    Lymphocytes Absolute 2 37 0 60 - 4 47 Thousands/µL    Monocytes Absolute 0 64 0 17 - 1 22 Thousand/µL    Eosinophils Absolute 0 24 0 00 - 0 61 Thousand/µL    Basophils Absolute 0 06 0 00 - 0 10 Thousands/µL   Comprehensive metabolic panel    Collection Time: 12/09/19 11:19 AM   Result Value Ref Range    Sodium 138 136 - 145 mmol/L    Potassium 3 9 3 5 - 5 3 mmol/L    Chloride 99 (L) 100 - 108 mmol/L    CO2 30 21 - 32 mmol/L    ANION GAP 9 4 - 13 mmol/L    BUN 19 5 - 25 mg/dL    Creatinine 0 86 0 60 - 1 30 mg/dL    Glucose, Fasting 95 65 - 99 mg/dL    Calcium 9 4 8 3 - 10 1 mg/dL    AST 19 5 - 45 U/L    ALT 14 12 - 78 U/L    Alkaline Phosphatase 60 46 - 116 U/L    Total Protein 7 5 6 4 - 8 2 g/dL    Albumin 3 8 3 5 - 5 0 g/dL    Total Bilirubin 0 45 0 20 - 1 00 mg/dL    eGFR 67 ml/min/1 73sq m   Lipid panel    Collection Time: 12/09/19 11:19 AM   Result Value Ref Range    Cholesterol 261 (H) 50 - 200 mg/dL    Triglycerides 81 <=150 mg/dL    HDL, Direct 65 >=40 mg/dL    LDL Calculated 180 (H) 0 - 100 mg/dL Non-HDL-Chol (CHOL-HDL) 196 mg/dl       Assessment/Plan:    No problem-specific Assessment & Plan notes found for this encounter  Problem List Items Addressed This Visit        Cardiovascular and Mediastinum    Benign essential hypertension     Well controlled on lisinopril/hydrochlorothiazide  Continue same  Refill necessary at this time  She will contact the office 1 refills needed            Musculoskeletal and Integument    Generalized osteoarthritis     Patient does have p r n  Ibuprofen  This does provide some relief for her generalized osteoarthritis            Other    Flu vaccine need    Relevant Orders    influenza vaccine, 1754-2117, high-dose, PF 0 5 mL (FLUZONE HIGH-DOSE) (Completed)    Hyperlipidemia     Cholesterol is elevated even higher  Patient was previously on statin therapy back in 2013  She had wanted to get off of statin therapy  At this time she has difficulty regulating her diet  Will place the patient on low-dose Crestor 5 mg once daily  So long as the patient is able to tolerate Crestor then she will contact the office and I will provide a 3 month refill  Repeat lipid profile in 4 months with the patient coming into the office in 6 months          Relevant Medications    rosuvastatin (CRESTOR) 5 mg tablet    Other Relevant Orders    Comprehensive metabolic panel    Lipid panel    RESOLVED: Insomnia    Medicare annual wellness visit, subsequent - Primary     Subsequent annual wellness visit completed         Vitamin D deficiency     Vitamin-D level is improving  Continue on over-the-counter vitamin-D supplementation    Repeat vitamin-D level in 6 months

## 2019-12-16 NOTE — ASSESSMENT & PLAN NOTE
Patient does have p r n  Ibuprofen    This does provide some relief for her generalized osteoarthritis

## 2019-12-17 ENCOUNTER — TELEPHONE (OUTPATIENT)
Dept: FAMILY MEDICINE CLINIC | Facility: CLINIC | Age: 74
End: 2019-12-17

## 2019-12-17 NOTE — TELEPHONE ENCOUNTER
PATIENT CALLED IN AND SAID THE CRESTOR IS GOING TO BE [phone] FOR 30 DAYS    SHE WOULD LIKE TO KNOW IF THERE IS SOMETHING CHEAPER THAT CAN BE CALLED IN     SHE SAID SHE USED TO TAKE PRAVASTATIN

## 2019-12-17 NOTE — TELEPHONE ENCOUNTER
Pt called back requesting Dr Beatriz Castillo order Clindamycin Topical Solution 1% cream   She said it was previously ordered by a dermatologist

## 2019-12-18 DIAGNOSIS — E78.2 MIXED HYPERLIPIDEMIA: Primary | ICD-10-CM

## 2019-12-18 RX ORDER — PRAVASTATIN SODIUM 10 MG
10 TABLET ORAL
Qty: 90 TABLET | Refills: 1 | Status: SHIPPED | OUTPATIENT
Start: 2019-12-18 | End: 2020-06-15 | Stop reason: SDUPTHER

## 2019-12-18 NOTE — TELEPHONE ENCOUNTER
I spoke with Wilver De Leon and advised her that a new med was sent and she will contact dermatology for the other medication

## 2019-12-18 NOTE — TELEPHONE ENCOUNTER
Prescription sent to pharmacy for pravastatin instead    Dermatologist would have to order clindamycin because she is asking for solution/cream??

## 2020-03-31 ENCOUNTER — TELEPHONE (OUTPATIENT)
Dept: FAMILY MEDICINE CLINIC | Facility: CLINIC | Age: 75
End: 2020-03-31

## 2020-04-03 DIAGNOSIS — I10 ESSENTIAL HYPERTENSION: ICD-10-CM

## 2020-04-03 RX ORDER — LISINOPRIL AND HYDROCHLOROTHIAZIDE 25; 20 MG/1; MG/1
TABLET ORAL
Qty: 90 TABLET | Refills: 0 | Status: SHIPPED | OUTPATIENT
Start: 2020-04-03 | End: 2020-06-15 | Stop reason: SDUPTHER

## 2020-04-09 ENCOUNTER — APPOINTMENT (OUTPATIENT)
Dept: LAB | Facility: CLINIC | Age: 75
End: 2020-04-09
Payer: MEDICARE

## 2020-04-09 ENCOUNTER — TRANSCRIBE ORDERS (OUTPATIENT)
Dept: LAB | Facility: CLINIC | Age: 75
End: 2020-04-09

## 2020-04-09 DIAGNOSIS — E78.2 MIXED HYPERLIPIDEMIA: ICD-10-CM

## 2020-04-09 LAB
ALBUMIN SERPL BCP-MCNC: 3.6 G/DL (ref 3.5–5)
ALP SERPL-CCNC: 44 U/L (ref 46–116)
ALT SERPL W P-5'-P-CCNC: 22 U/L (ref 12–78)
ANION GAP SERPL CALCULATED.3IONS-SCNC: 5 MMOL/L (ref 4–13)
AST SERPL W P-5'-P-CCNC: 13 U/L (ref 5–45)
BILIRUB SERPL-MCNC: 0.42 MG/DL (ref 0.2–1)
BUN SERPL-MCNC: 23 MG/DL (ref 5–25)
CALCIUM SERPL-MCNC: 9.1 MG/DL (ref 8.3–10.1)
CHLORIDE SERPL-SCNC: 101 MMOL/L (ref 100–108)
CHOLEST SERPL-MCNC: 227 MG/DL (ref 50–200)
CO2 SERPL-SCNC: 33 MMOL/L (ref 21–32)
CREAT SERPL-MCNC: 0.83 MG/DL (ref 0.6–1.3)
GFR SERPL CREATININE-BSD FRML MDRD: 70 ML/MIN/1.73SQ M
GLUCOSE P FAST SERPL-MCNC: 100 MG/DL (ref 65–99)
HDLC SERPL-MCNC: 64 MG/DL
LDLC SERPL CALC-MCNC: 149 MG/DL (ref 0–100)
NONHDLC SERPL-MCNC: 163 MG/DL
POTASSIUM SERPL-SCNC: 3.9 MMOL/L (ref 3.5–5.3)
PROT SERPL-MCNC: 7.2 G/DL (ref 6.4–8.2)
SODIUM SERPL-SCNC: 139 MMOL/L (ref 136–145)
TRIGL SERPL-MCNC: 72 MG/DL

## 2020-04-09 PROCEDURE — 36415 COLL VENOUS BLD VENIPUNCTURE: CPT

## 2020-04-09 PROCEDURE — 80061 LIPID PANEL: CPT

## 2020-04-09 PROCEDURE — 80053 COMPREHEN METABOLIC PANEL: CPT

## 2020-06-15 ENCOUNTER — TELEPHONE (OUTPATIENT)
Dept: FAMILY MEDICINE CLINIC | Facility: CLINIC | Age: 75
End: 2020-06-15

## 2020-06-15 ENCOUNTER — OFFICE VISIT (OUTPATIENT)
Dept: FAMILY MEDICINE CLINIC | Facility: CLINIC | Age: 75
End: 2020-06-15
Payer: MEDICARE

## 2020-06-15 ENCOUNTER — LAB (OUTPATIENT)
Dept: LAB | Facility: CLINIC | Age: 75
End: 2020-06-15
Payer: MEDICARE

## 2020-06-15 VITALS
DIASTOLIC BLOOD PRESSURE: 64 MMHG | SYSTOLIC BLOOD PRESSURE: 124 MMHG | BODY MASS INDEX: 25.07 KG/M2 | RESPIRATION RATE: 16 BRPM | WEIGHT: 156 LBS | HEART RATE: 72 BPM | HEIGHT: 66 IN | OXYGEN SATURATION: 95 % | TEMPERATURE: 98.1 F

## 2020-06-15 DIAGNOSIS — E55.9 VITAMIN D DEFICIENCY: ICD-10-CM

## 2020-06-15 DIAGNOSIS — Z20.822 EXPOSURE TO COVID-19 VIRUS: ICD-10-CM

## 2020-06-15 DIAGNOSIS — I10 BENIGN ESSENTIAL HYPERTENSION: Primary | ICD-10-CM

## 2020-06-15 DIAGNOSIS — E78.2 MIXED HYPERLIPIDEMIA: ICD-10-CM

## 2020-06-15 DIAGNOSIS — I10 ESSENTIAL HYPERTENSION: ICD-10-CM

## 2020-06-15 PROBLEM — Z11.59 NEED FOR HEPATITIS C SCREENING TEST: Status: RESOLVED | Noted: 2019-07-15 | Resolved: 2020-06-15

## 2020-06-15 PROCEDURE — 86769 SARS-COV-2 COVID-19 ANTIBODY: CPT

## 2020-06-15 PROCEDURE — 1160F RVW MEDS BY RX/DR IN RCRD: CPT | Performed by: FAMILY MEDICINE

## 2020-06-15 PROCEDURE — 3074F SYST BP LT 130 MM HG: CPT | Performed by: FAMILY MEDICINE

## 2020-06-15 PROCEDURE — 1036F TOBACCO NON-USER: CPT | Performed by: FAMILY MEDICINE

## 2020-06-15 PROCEDURE — 4040F PNEUMOC VAC/ADMIN/RCVD: CPT | Performed by: FAMILY MEDICINE

## 2020-06-15 PROCEDURE — 3078F DIAST BP <80 MM HG: CPT | Performed by: FAMILY MEDICINE

## 2020-06-15 PROCEDURE — 3008F BODY MASS INDEX DOCD: CPT | Performed by: FAMILY MEDICINE

## 2020-06-15 PROCEDURE — 99214 OFFICE O/P EST MOD 30 MIN: CPT | Performed by: FAMILY MEDICINE

## 2020-06-15 PROCEDURE — 36415 COLL VENOUS BLD VENIPUNCTURE: CPT

## 2020-06-15 RX ORDER — LISINOPRIL AND HYDROCHLOROTHIAZIDE 25; 20 MG/1; MG/1
1 TABLET ORAL DAILY
Qty: 90 TABLET | Refills: 1 | Status: SHIPPED | OUTPATIENT
Start: 2020-06-15 | End: 2021-04-26 | Stop reason: SDUPTHER

## 2020-06-15 RX ORDER — PRAVASTATIN SODIUM 20 MG
20 TABLET ORAL
Qty: 90 TABLET | Refills: 1 | Status: SHIPPED | OUTPATIENT
Start: 2020-06-15 | End: 2020-10-19

## 2020-06-16 LAB — SARS-COV-2 IGG SERPL QL IA: NEGATIVE

## 2020-07-13 ENCOUNTER — TELEPHONE (OUTPATIENT)
Dept: FAMILY MEDICINE CLINIC | Facility: CLINIC | Age: 75
End: 2020-07-13

## 2020-07-13 NOTE — TELEPHONE ENCOUNTER
Pt called stated that her employer wants her to go back to work, working 3 days and 4 hours a day  She wants to make sure that Dr Ivory Khan is ok with that  She is having panic attacks thinking about going back to work

## 2020-07-15 NOTE — TELEPHONE ENCOUNTER
???  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

## 2020-10-14 ENCOUNTER — LAB (OUTPATIENT)
Dept: LAB | Facility: CLINIC | Age: 75
End: 2020-10-14
Payer: MEDICARE

## 2020-10-14 DIAGNOSIS — E55.9 VITAMIN D DEFICIENCY: ICD-10-CM

## 2020-10-14 DIAGNOSIS — E78.2 MIXED HYPERLIPIDEMIA: ICD-10-CM

## 2020-10-14 DIAGNOSIS — I10 BENIGN ESSENTIAL HYPERTENSION: ICD-10-CM

## 2020-10-14 LAB
ALBUMIN SERPL BCP-MCNC: 3.7 G/DL (ref 3.5–5)
ALP SERPL-CCNC: 49 U/L (ref 46–116)
ALT SERPL W P-5'-P-CCNC: 14 U/L (ref 12–78)
ANION GAP SERPL CALCULATED.3IONS-SCNC: 2 MMOL/L (ref 4–13)
AST SERPL W P-5'-P-CCNC: 16 U/L (ref 5–45)
BASOPHILS # BLD AUTO: 0.03 THOUSANDS/ΜL (ref 0–0.1)
BASOPHILS NFR BLD AUTO: 1 % (ref 0–1)
BILIRUB SERPL-MCNC: 0.51 MG/DL (ref 0.2–1)
BUN SERPL-MCNC: 18 MG/DL (ref 5–25)
CALCIUM SERPL-MCNC: 9.3 MG/DL (ref 8.3–10.1)
CHLORIDE SERPL-SCNC: 102 MMOL/L (ref 100–108)
CHOLEST SERPL-MCNC: 187 MG/DL (ref 50–200)
CO2 SERPL-SCNC: 32 MMOL/L (ref 21–32)
CREAT SERPL-MCNC: 0.86 MG/DL (ref 0.6–1.3)
EOSINOPHIL # BLD AUTO: 0.09 THOUSAND/ΜL (ref 0–0.61)
EOSINOPHIL NFR BLD AUTO: 1 % (ref 0–6)
ERYTHROCYTE [DISTWIDTH] IN BLOOD BY AUTOMATED COUNT: 12.5 % (ref 11.6–15.1)
GFR SERPL CREATININE-BSD FRML MDRD: 66 ML/MIN/1.73SQ M
GLUCOSE P FAST SERPL-MCNC: 94 MG/DL (ref 65–99)
HCT VFR BLD AUTO: 37.3 % (ref 34.8–46.1)
HDLC SERPL-MCNC: 67 MG/DL
HGB BLD-MCNC: 12.2 G/DL (ref 11.5–15.4)
IMM GRANULOCYTES # BLD AUTO: 0.02 THOUSAND/UL (ref 0–0.2)
IMM GRANULOCYTES NFR BLD AUTO: 0 % (ref 0–2)
LDLC SERPL CALC-MCNC: 102 MG/DL (ref 0–100)
LYMPHOCYTES # BLD AUTO: 2.82 THOUSANDS/ΜL (ref 0.6–4.47)
LYMPHOCYTES NFR BLD AUTO: 44 % (ref 14–44)
MCH RBC QN AUTO: 32.3 PG (ref 26.8–34.3)
MCHC RBC AUTO-ENTMCNC: 32.7 G/DL (ref 31.4–37.4)
MCV RBC AUTO: 99 FL (ref 82–98)
MONOCYTES # BLD AUTO: 0.49 THOUSAND/ΜL (ref 0.17–1.22)
MONOCYTES NFR BLD AUTO: 8 % (ref 4–12)
NEUTROPHILS # BLD AUTO: 2.97 THOUSANDS/ΜL (ref 1.85–7.62)
NEUTS SEG NFR BLD AUTO: 46 % (ref 43–75)
NONHDLC SERPL-MCNC: 120 MG/DL
NRBC BLD AUTO-RTO: 0 /100 WBCS
PLATELET # BLD AUTO: 240 THOUSANDS/UL (ref 149–390)
PMV BLD AUTO: 9.2 FL (ref 8.9–12.7)
POTASSIUM SERPL-SCNC: 3.7 MMOL/L (ref 3.5–5.3)
PROT SERPL-MCNC: 7.4 G/DL (ref 6.4–8.2)
RBC # BLD AUTO: 3.78 MILLION/UL (ref 3.81–5.12)
SODIUM SERPL-SCNC: 136 MMOL/L (ref 136–145)
TRIGL SERPL-MCNC: 92 MG/DL
TSH SERPL DL<=0.05 MIU/L-ACNC: 0.97 UIU/ML (ref 0.36–3.74)
WBC # BLD AUTO: 6.42 THOUSAND/UL (ref 4.31–10.16)

## 2020-10-14 PROCEDURE — 36415 COLL VENOUS BLD VENIPUNCTURE: CPT

## 2020-10-14 PROCEDURE — 80061 LIPID PANEL: CPT

## 2020-10-14 PROCEDURE — 82652 VIT D 1 25-DIHYDROXY: CPT

## 2020-10-14 PROCEDURE — 85025 COMPLETE CBC W/AUTO DIFF WBC: CPT

## 2020-10-14 PROCEDURE — 84443 ASSAY THYROID STIM HORMONE: CPT

## 2020-10-14 PROCEDURE — 80053 COMPREHEN METABOLIC PANEL: CPT

## 2020-10-16 LAB — 1,25(OH)2D3 SERPL-MCNC: 56.8 PG/ML (ref 19.9–79.3)

## 2020-10-19 ENCOUNTER — OFFICE VISIT (OUTPATIENT)
Dept: FAMILY MEDICINE CLINIC | Facility: CLINIC | Age: 75
End: 2020-10-19
Payer: MEDICARE

## 2020-10-19 VITALS
HEIGHT: 66 IN | WEIGHT: 158 LBS | BODY MASS INDEX: 25.39 KG/M2 | DIASTOLIC BLOOD PRESSURE: 64 MMHG | TEMPERATURE: 97.8 F | RESPIRATION RATE: 16 BRPM | SYSTOLIC BLOOD PRESSURE: 128 MMHG

## 2020-10-19 DIAGNOSIS — I10 BENIGN ESSENTIAL HYPERTENSION: ICD-10-CM

## 2020-10-19 DIAGNOSIS — E55.9 VITAMIN D DEFICIENCY: ICD-10-CM

## 2020-10-19 DIAGNOSIS — Z23 FLU VACCINE NEED: ICD-10-CM

## 2020-10-19 DIAGNOSIS — E78.2 MIXED HYPERLIPIDEMIA: Primary | ICD-10-CM

## 2020-10-19 PROCEDURE — 90662 IIV NO PRSV INCREASED AG IM: CPT | Performed by: FAMILY MEDICINE

## 2020-10-19 PROCEDURE — G0008 ADMIN INFLUENZA VIRUS VAC: HCPCS | Performed by: FAMILY MEDICINE

## 2020-10-19 PROCEDURE — 99214 OFFICE O/P EST MOD 30 MIN: CPT | Performed by: FAMILY MEDICINE

## 2020-10-19 RX ORDER — SIMVASTATIN 20 MG
20 TABLET ORAL
Qty: 90 TABLET | Refills: 1 | Status: SHIPPED | OUTPATIENT
Start: 2020-10-19 | End: 2021-04-07

## 2021-01-20 ENCOUNTER — TELEPHONE (OUTPATIENT)
Dept: FAMILY MEDICINE CLINIC | Facility: CLINIC | Age: 76
End: 2021-01-20

## 2021-01-20 NOTE — TELEPHONE ENCOUNTER
Patient called to let us know that [facility] was giving the COVID Vaccine and she received her first shot on 1/13/2021 and is scheduled on 2/10/2021 to receive her 2nd shot  Just wanted to let the doctor know

## 2021-04-06 ENCOUNTER — TELEPHONE (OUTPATIENT)
Dept: FAMILY MEDICINE CLINIC | Facility: CLINIC | Age: 76
End: 2021-04-06

## 2021-04-06 NOTE — TELEPHONE ENCOUNTER
Patient called stating that she read that her simvastatin causes liver damage if she drinks or eats pomegranates and that is her favorite thing to eat and drink so she wanted to switch back to her pravastatin, dr Brie Roa informed that is the case for any vastatin   I explained that to the patient she claimed she still wanted to switch because she never had any issues before, it was just more money     Please call patient back

## 2021-04-07 DIAGNOSIS — E78.2 MIXED HYPERLIPIDEMIA: Primary | ICD-10-CM

## 2021-04-07 RX ORDER — PRAVASTATIN SODIUM 20 MG
20 TABLET ORAL
Qty: 90 TABLET | Refills: 1 | Status: SHIPPED | OUTPATIENT
Start: 2021-04-07 | End: 2021-11-01 | Stop reason: SDUPTHER

## 2021-04-21 ENCOUNTER — APPOINTMENT (OUTPATIENT)
Dept: LAB | Facility: CLINIC | Age: 76
End: 2021-04-21
Payer: MEDICARE

## 2021-04-21 ENCOUNTER — TRANSCRIBE ORDERS (OUTPATIENT)
Dept: LAB | Facility: CLINIC | Age: 76
End: 2021-04-21

## 2021-04-21 DIAGNOSIS — I10 BENIGN ESSENTIAL HYPERTENSION: ICD-10-CM

## 2021-04-21 DIAGNOSIS — E78.2 MIXED HYPERLIPIDEMIA: ICD-10-CM

## 2021-04-21 DIAGNOSIS — E55.9 VITAMIN D DEFICIENCY: ICD-10-CM

## 2021-04-21 LAB
ALBUMIN SERPL BCP-MCNC: 3.7 G/DL (ref 3.5–5)
ALP SERPL-CCNC: 51 U/L (ref 46–116)
ALT SERPL W P-5'-P-CCNC: 17 U/L (ref 12–78)
ANION GAP SERPL CALCULATED.3IONS-SCNC: 7 MMOL/L (ref 4–13)
AST SERPL W P-5'-P-CCNC: 15 U/L (ref 5–45)
BASOPHILS # BLD AUTO: 0.03 THOUSANDS/ΜL (ref 0–0.1)
BASOPHILS NFR BLD AUTO: 1 % (ref 0–1)
BILIRUB SERPL-MCNC: 0.46 MG/DL (ref 0.2–1)
BUN SERPL-MCNC: 18 MG/DL (ref 5–25)
CALCIUM SERPL-MCNC: 8.9 MG/DL (ref 8.3–10.1)
CHLORIDE SERPL-SCNC: 102 MMOL/L (ref 100–108)
CHOLEST SERPL-MCNC: 187 MG/DL (ref 50–200)
CO2 SERPL-SCNC: 30 MMOL/L (ref 21–32)
CREAT SERPL-MCNC: 0.87 MG/DL (ref 0.6–1.3)
EOSINOPHIL # BLD AUTO: 0.1 THOUSAND/ΜL (ref 0–0.61)
EOSINOPHIL NFR BLD AUTO: 2 % (ref 0–6)
ERYTHROCYTE [DISTWIDTH] IN BLOOD BY AUTOMATED COUNT: 12.5 % (ref 11.6–15.1)
GFR SERPL CREATININE-BSD FRML MDRD: 65 ML/MIN/1.73SQ M
GLUCOSE P FAST SERPL-MCNC: 88 MG/DL (ref 65–99)
HCT VFR BLD AUTO: 36.7 % (ref 34.8–46.1)
HDLC SERPL-MCNC: 66 MG/DL
HGB BLD-MCNC: 12 G/DL (ref 11.5–15.4)
IMM GRANULOCYTES # BLD AUTO: 0.01 THOUSAND/UL (ref 0–0.2)
IMM GRANULOCYTES NFR BLD AUTO: 0 % (ref 0–2)
LDLC SERPL CALC-MCNC: 107 MG/DL (ref 0–100)
LYMPHOCYTES # BLD AUTO: 2.44 THOUSANDS/ΜL (ref 0.6–4.47)
LYMPHOCYTES NFR BLD AUTO: 43 % (ref 14–44)
MCH RBC QN AUTO: 32.2 PG (ref 26.8–34.3)
MCHC RBC AUTO-ENTMCNC: 32.7 G/DL (ref 31.4–37.4)
MCV RBC AUTO: 98 FL (ref 82–98)
MONOCYTES # BLD AUTO: 0.49 THOUSAND/ΜL (ref 0.17–1.22)
MONOCYTES NFR BLD AUTO: 9 % (ref 4–12)
NEUTROPHILS # BLD AUTO: 2.61 THOUSANDS/ΜL (ref 1.85–7.62)
NEUTS SEG NFR BLD AUTO: 45 % (ref 43–75)
NONHDLC SERPL-MCNC: 121 MG/DL
NRBC BLD AUTO-RTO: 0 /100 WBCS
PLATELET # BLD AUTO: 241 THOUSANDS/UL (ref 149–390)
PMV BLD AUTO: 9.5 FL (ref 8.9–12.7)
POTASSIUM SERPL-SCNC: 4.1 MMOL/L (ref 3.5–5.3)
PROT SERPL-MCNC: 7.2 G/DL (ref 6.4–8.2)
RBC # BLD AUTO: 3.73 MILLION/UL (ref 3.81–5.12)
SODIUM SERPL-SCNC: 139 MMOL/L (ref 136–145)
TRIGL SERPL-MCNC: 72 MG/DL
TSH SERPL DL<=0.05 MIU/L-ACNC: 1.03 UIU/ML (ref 0.36–3.74)
WBC # BLD AUTO: 5.68 THOUSAND/UL (ref 4.31–10.16)

## 2021-04-21 PROCEDURE — 85025 COMPLETE CBC W/AUTO DIFF WBC: CPT

## 2021-04-21 PROCEDURE — 84443 ASSAY THYROID STIM HORMONE: CPT

## 2021-04-21 PROCEDURE — 36415 COLL VENOUS BLD VENIPUNCTURE: CPT

## 2021-04-21 PROCEDURE — 82652 VIT D 1 25-DIHYDROXY: CPT

## 2021-04-21 PROCEDURE — 80053 COMPREHEN METABOLIC PANEL: CPT

## 2021-04-21 PROCEDURE — 80061 LIPID PANEL: CPT

## 2021-04-23 LAB — 1,25(OH)2D3 SERPL-MCNC: 61.1 PG/ML (ref 19.9–79.3)

## 2021-04-26 ENCOUNTER — OFFICE VISIT (OUTPATIENT)
Dept: FAMILY MEDICINE CLINIC | Facility: CLINIC | Age: 76
End: 2021-04-26
Payer: MEDICARE

## 2021-04-26 VITALS
BODY MASS INDEX: 25.07 KG/M2 | WEIGHT: 156 LBS | SYSTOLIC BLOOD PRESSURE: 134 MMHG | OXYGEN SATURATION: 97 % | HEART RATE: 74 BPM | HEIGHT: 66 IN | DIASTOLIC BLOOD PRESSURE: 68 MMHG | RESPIRATION RATE: 16 BRPM

## 2021-04-26 DIAGNOSIS — H25.011 CORTICAL AGE-RELATED CATARACT OF RIGHT EYE: ICD-10-CM

## 2021-04-26 DIAGNOSIS — M51.36 DISC DEGENERATION, LUMBAR: ICD-10-CM

## 2021-04-26 DIAGNOSIS — I10 ESSENTIAL HYPERTENSION: ICD-10-CM

## 2021-04-26 DIAGNOSIS — I10 BENIGN ESSENTIAL HYPERTENSION: ICD-10-CM

## 2021-04-26 DIAGNOSIS — Z00.00 MEDICARE ANNUAL WELLNESS VISIT, SUBSEQUENT: Primary | ICD-10-CM

## 2021-04-26 DIAGNOSIS — M17.11 PRIMARY OSTEOARTHRITIS OF RIGHT KNEE: ICD-10-CM

## 2021-04-26 DIAGNOSIS — E78.2 MIXED HYPERLIPIDEMIA: ICD-10-CM

## 2021-04-26 DIAGNOSIS — E55.9 VITAMIN D DEFICIENCY: ICD-10-CM

## 2021-04-26 DIAGNOSIS — Z12.11 COLON CANCER SCREENING: ICD-10-CM

## 2021-04-26 PROCEDURE — G0438 PPPS, INITIAL VISIT: HCPCS | Performed by: FAMILY MEDICINE

## 2021-04-26 PROCEDURE — 1123F ACP DISCUSS/DSCN MKR DOCD: CPT | Performed by: FAMILY MEDICINE

## 2021-04-26 PROCEDURE — 99214 OFFICE O/P EST MOD 30 MIN: CPT | Performed by: FAMILY MEDICINE

## 2021-04-26 RX ORDER — LISINOPRIL AND HYDROCHLOROTHIAZIDE 25; 20 MG/1; MG/1
1 TABLET ORAL DAILY
Qty: 90 TABLET | Refills: 1 | Status: SHIPPED | OUTPATIENT
Start: 2021-04-26 | End: 2021-11-01 | Stop reason: SDUPTHER

## 2021-04-26 NOTE — ASSESSMENT & PLAN NOTE
Patient could not tolerate simvastatin  Placed back on to pravastatin  Patient is dealing with cost of medication    Repeat lipid profile to be done in 6 months

## 2021-04-26 NOTE — PROGRESS NOTES
Assessment and Plan:     Problem List Items Addressed This Visit     None        BMI Counseling: Body mass index is 25 18 kg/m²  The BMI is above normal  Nutrition recommendations include reducing intake of saturated and trans fat and reducing intake of cholesterol  Preventive health issues were discussed with patient, and age appropriate screening tests were ordered as noted in patient's After Visit Summary  Personalized health advice and appropriate referrals for health education or preventive services given if needed, as noted in patient's After Visit Summary       History of Present Illness:     Patient presents for Medicare Annual Wellness visit    Patient Care Team:  Flavia Ken DO as PCP - General  MD Silviano Rajupt MD Kathyanne Ege, MD as Endoscopist  Gerald Campos Od (Optometry)  Laureano Washington DDS     Problem List:     Patient Active Problem List   Diagnosis    Benign essential hypertension    Benign neoplasm of bone    Disc degeneration, lumbar    Facial nerve disorder    Generalized osteoarthritis    Hyperlipidemia    Symptomatic menopausal or female climacteric states    Polyp of sigmoid colon    Primary osteoarthritis of right knee    Vitamin D deficiency    Breast cancer screening    Flu vaccine need    Medicare annual wellness visit, subsequent    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    Exposure to COVID-19 virus      Past Medical and Surgical History:     Past Medical History:   Diagnosis Date    Arthritis     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, unspecified     Last assessed: 10/17/13    Colon polyps     Depression     Hyperlipidemia     Hypertension     Lumbago     last assessed: 1/15/14    Menopause     Neuropathy     Ptosis, left eyelid     last assessed: 10/16/15    Shortness of breath     Vertigo     last assessed: 10/26/15     Past Surgical History:   Procedure Laterality Date    BLADDER SURGERY      HYSTERECTOMY  MT COLONOSCOPY FLX DX W/COLLJ SPEC WHEN PFRMD N/A 4/11/2016    Procedure: COLONOSCOPY;  Surgeon: Russell Adkins MD;  Location: [facility]; Service: Gastroenterology      Family History:     Family History   Problem Relation Age of Onset    Heart disease Other         cardiac disorder      Social History:     Social History     Socioeconomic History    Marital status:      Spouse name: Not on file    Number of children: Not on file    Years of education: Not on file    Highest education level: Not on file   Occupational History    Not on file   Social Needs    Financial resource strain: Not on file    Food insecurity     Worry: Not on file     Inability: Not on file    Transportation needs     Medical: Not on file     Non-medical: Not on file   Tobacco Use    Smoking status: Never Smoker    Smokeless tobacco: Never Used   Substance and Sexual Activity    Alcohol use: Yes     Comment: social, rarely, per [employer]    Drug use: No    Sexual activity: Not on file   Lifestyle    Physical activity     Days per week: Not on file     Minutes per session: Not on file    Stress: Not on file   Relationships    Social connections     Talks on phone: Not on file     Gets together: Not on file     Attends Cheondoism service: Not on file     Active member of club or organization: Not on file     Attends meetings of clubs or organizations: Not on file     Relationship status: Not on file    Intimate partner violence     Fear of current or ex partner: Not on file     Emotionally abused: Not on file     Physically abused: Not on file     Forced sexual activity: Not on file   Other Topics Concern    Not on file   Social History Narrative    Caffeine use    , per [employer]       Medications and Allergies:     Current Outpatient Medications   Medication Sig Dispense Refill    acetaminophen (TYLENOL) 500 mg tablet Take 500 mg by mouth every 6 (six) hours as needed for mild pain        Cholecalciferol (VITAMIN D3) 5000 units CAPS Take 2,000 Units by mouth every other day        ibuprofen (MOTRIN) 600 mg tablet Take 1 tablet (600 mg total) by mouth every 6 (six) hours as needed for mild pain (Patient not taking: Reported on 10/19/2020) 60 tablet 4    lisinopril-hydrochlorothiazide (PRINZIDE,ZESTORETIC) 20-25 MG per tablet Take 1 tablet by mouth daily 90 tablet 1    pravastatin (PRAVACHOL) 20 mg tablet Take 1 tablet (20 mg total) by mouth daily at bedtime 90 tablet 1     No current facility-administered medications for this visit  Allergies   Allergen Reactions    Benzoyl Peroxide Rash      Immunizations:     Immunization History   Administered Date(s) Administered    Influenza Split High Dose Preservative Free IM 09/24/2013, 10/08/2014, 10/26/2015, 12/01/2016, 12/12/2017    Influenza, high dose seasonal 0 7 mL 12/13/2018, 12/16/2019, 10/19/2020    Pneumococcal Conjugate 13-Valent 05/02/2016    Pneumococcal Conjugate PCV 7 05/09/2011    Pneumococcal Polysaccharide PPV23 05/09/2011    SARS-CoV-2 / COVID-19 01/13/2021    SARS-CoV-2 / COVID-19 mRNA IM ([place]) 01/13/2021, 02/11/2021      Health Maintenance:         Topic Date Due    Hepatitis C Screening  Never done    Colonoscopy Surveillance  04/11/2019    Colorectal Cancer Screening  04/11/2026     There are no preventive care reminders to display for this patient  Medicare Health Risk Assessment:     Ht 5' 6" (1 676 m)   BMI 25 50 kg/m²      Evie Holman is here for her Subsequent Wellness visit  Last Medicare Wellness visit information reviewed, patient interviewed, no change since last AWV  Health Risk Assessment:   Patient rates overall health as good  Patient feels that their physical health rating is slightly better  Patient is satisfied with their life  Eyesight was rated as slightly worse  Hearing was rated as same  Patient feels that their emotional and mental health rating is slightly better   Patients states they are never, rarely angry  Patient states they are sometimes unusually tired/fatigued  Pain experienced in the last 7 days has been some  Patient's pain rating has been 3/10  Patient states that she has experienced no weight loss or gain in last 6 months  Depression Screening:   PHQ-2 Score: 0      Fall Risk Screening: In the past year, patient has experienced: no history of falling in past year      Urinary Incontinence Screening:   Patient has not leaked urine accidently in the last six months  Home Safety:  Patient does not have trouble with stairs inside or outside of their home  Patient has working smoke alarms and has working carbon monoxide detector  Home safety hazards include: none  Nutrition:   Current diet is Regular  Medications:   Patient is currently taking over-the-counter supplements  OTC medications include: see medication list  Patient is able to manage medications  Activities of Daily Living (ADLs)/Instrumental Activities of Daily Living (IADLs):   Walk and transfer into and out of bed and chair?: Yes  Dress and groom yourself?: Yes    Bathe or shower yourself?: Yes    Feed yourself? Yes  Do your laundry/housekeeping?: Yes  Manage your money, pay your bills and track your expenses?: Yes  Make your own meals?: Yes    Do your own shopping?: Yes    Previous Hospitalizations:   Any hospitalizations or ED visits within the last 12 months?: No      Advance Care Planning:   Living will: Yes    Durable POA for healthcare:  Yes    Advanced directive: Yes    Advanced directive counseling given: No    Five wishes given: No    Patient declined ACP directive: No    End of Life Decisions reviewed with patient: Yes    Provider agrees with end of life decisions: Yes      PREVENTIVE SCREENINGS      Cardiovascular Screening:    General: Screening Not Indicated and History Lipid Disorder      Diabetes Screening:     General: Screening Current      Colorectal Cancer Screening:     General: Screening Current      Breast Cancer Screening:     General: Risks and Benefits Discussed    Due for: Mammogram        Cervical Cancer Screening:    General: Screening Not Indicated      Osteoporosis Screening:    General: Screening Current      Abdominal Aortic Aneurysm (AAA) Screening:        General: Screening Not Indicated      Lung Cancer Screening:     General: Screening Not Indicated      Hepatitis C Screening:    General: Screening Current    Screening, Brief Intervention, and Referral to Treatment (SBIRT)    Screening      AUDIT-C Screenin) How often did you have a drink containing alcohol in the past year? monthly or less  2) How many drinks did you have on a typical day when you were drinking in the past year?  1 to 2  3) How often did you have 6 or more drinks on one occasion in the past year? never    AUDIT-C Score: 1  Interpretation: Score 0-2 (female): Negative screen for alcohol misuse    Single Item Drug Screening:  How often have you used an illegal drug (including marijuana) or a prescription medication for non-medical reasons in the past year? never    Single Item Drug Screen Score: 0  Interpretation: Negative screen for possible drug use disorder      Kylee Olivarez, DO

## 2021-04-26 NOTE — ASSESSMENT & PLAN NOTE
Referral back to Gastroenterology for colonoscopy  Last colonoscopy was in 2016  Reportedly was due to have repeat done in 3 years  Is now been 5 years    Does have history of colon polyps

## 2021-04-26 NOTE — ASSESSMENT & PLAN NOTE
Hypertension remains well controlled on lisinopril/hydrochlorothiazide  Refill provided  Continue same    Re-evaluate 6 months

## 2021-04-26 NOTE — ASSESSMENT & PLAN NOTE
Therapeutic vitamin-D level on over-the-counter supplementation    Repeat vitamin-D level to be done in 6 months

## 2021-04-26 NOTE — PROGRESS NOTES
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  Patient has been feeling somewhat down as daughter return back to [place], other daughter is in organ  Friend that she used to do things with passed away last year  She only works and has her cats  Labs reviewed which showed normal vitamin-D level at 61 1, normal CBC, CMP, well controlled cholesterol profile on pravastatin  Total cholesterol 187, triglycerides 72, HDL 66,   Patient does need to find new ophthalmologist   Prior ophthalmologist retired and she did not have eye examination last year due to COVID-19 pandemic  Patient is legally blind in her left eye  Right eye does have developing cataract which she was advised of 2 years ago  Patient is also due for repeat colonoscopy  Last colonoscopy was done in 2016  Patient also complains of left knee pain  She does have tricompartmental arthritis of her left knee  She had received corticosteroid injection quite a few years ago        Past Medical History:   Diagnosis Date    Arthritis     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, unspecified     Last assessed: 10/17/13    Colon polyps     Depression     Hyperlipidemia     Hypertension     Lumbago     last assessed: 1/15/14    Menopause     Neuropathy     Ptosis, left eyelid     last assessed: 10/16/15    Shortness of breath     Vertigo     last assessed: 10/26/15       Family History   Problem Relation Age of Onset    Heart disease Other         cardiac disorder       Past Surgical History:   Procedure Laterality Date    BLADDER SURGERY      HYSTERECTOMY      ID COLONOSCOPY FLX DX W/COLLJ SPEC WHEN PFRMD N/A 4/11/2016    Procedure: COLONOSCOPY;  Surgeon: Elizabeth Robles MD;  Location[facility]; Service: Gastroenterology        reports that she has never smoked  She has never used smokeless tobacco  She reports current alcohol use  She reports that she does not use drugs  Current Outpatient Medications:     acetaminophen (TYLENOL) 500 mg tablet, Take 500 mg by mouth every 6 (six) hours as needed for mild pain , Disp: , Rfl:     Cholecalciferol (VITAMIN D3) 5000 units CAPS, Take 2,000 Units by mouth every other day  , Disp: , Rfl:     lisinopril-hydrochlorothiazide (PRINZIDE,ZESTORETIC) 20-25 MG per tablet, Take 1 tablet by mouth daily, Disp: 90 tablet, Rfl: 1    pravastatin (PRAVACHOL) 20 mg tablet, Take 1 tablet (20 mg total) by mouth daily at bedtime, Disp: 90 tablet, Rfl: 1    ibuprofen (MOTRIN) 600 mg tablet, Take 1 tablet (600 mg total) by mouth every 6 (six) hours as needed for mild pain (Patient not taking: Reported on 10/19/2020), Disp: 60 tablet, Rfl: 4    The following portions of the patient's history were reviewed and updated as appropriate: allergies, current medications, past family history, past medical history, past social history, past surgical history and problem list     Review of Systems   Constitutional: Negative  HENT: Negative  Eyes: Positive for visual disturbance (Blind left eye)  Respiratory: Negative  Cardiovascular: Negative  Gastrointestinal: Negative  Endocrine: Negative  Genitourinary: Negative  Musculoskeletal: Positive for arthralgias (Right knee pain)  Skin: Negative  Allergic/Immunologic: Negative  Neurological: Negative  Hematological: Negative  Psychiatric/Behavioral: Negative  Objective:    /68   Pulse 74   Resp 16   Ht 5' 6" (1 676 m)   Wt 70 8 kg (156 lb)   SpO2 97%   BMI 25 18 kg/m²      Physical Exam  Vitals signs and nursing note reviewed  Constitutional:       General: She is not in acute distress  Appearance: Normal appearance  She is well-developed and normal weight   She is not diaphoretic  HENT:      Head: Normocephalic and atraumatic  Right Ear: Tympanic membrane, ear canal and external ear normal       Left Ear: Tympanic membrane, ear canal and external ear normal    Eyes:      Conjunctiva/sclera: Conjunctivae normal       Pupils: Pupils are equal, round, and reactive to light  Comments: Ptosis of left upper eyelid, legally blind left eye   Neck:      Musculoskeletal: Normal range of motion and neck supple  Cardiovascular:      Rate and Rhythm: Normal rate and regular rhythm  Pulses: Normal pulses  Heart sounds: Normal heart sounds  No murmur  Pulmonary:      Effort: Pulmonary effort is normal       Breath sounds: Normal breath sounds  Abdominal:      General: Abdomen is flat  Bowel sounds are normal       Palpations: Abdomen is soft  Musculoskeletal: Normal range of motion  Skin:     General: Skin is warm and dry  Findings: No rash  Neurological:      General: No focal deficit present  Mental Status: She is alert and oriented to person, place, and time  Mental status is at baseline  Deep Tendon Reflexes: Reflexes are normal and symmetric  Psychiatric:         Mood and Affect: Mood normal          Behavior: Behavior normal          Thought Content:  Thought content normal          Judgment: Judgment normal            Recent Results (from the past 1008 hour(s))   CBC and differential    Collection Time: 04/21/21 10:17 AM   Result Value Ref Range    WBC 5 68 4 31 - 10 16 Thousand/uL    RBC 3 73 (L) 3 81 - 5 12 Million/uL    Hemoglobin 12 0 11 5 - 15 4 g/dL    Hematocrit 36 7 34 8 - 46 1 %    MCV 98 82 - 98 fL    MCH 32 2 26 8 - 34 3 pg    MCHC 32 7 31 4 - 37 4 g/dL    RDW 12 5 11 6 - 15 1 %    MPV 9 5 8 9 - 12 7 fL    Platelets 241 883 - 946 Thousands/uL    nRBC 0 /100 WBCs    Neutrophils Relative 45 43 - 75 %    Immat GRANS % 0 0 - 2 %    Lymphocytes Relative 43 14 - 44 %    Monocytes Relative 9 4 - 12 %    Eosinophils Relative 2 0 - 6 % Basophils Relative 1 0 - 1 %    Neutrophils Absolute 2 61 1 85 - 7 62 Thousands/µL    Immature Grans Absolute 0 01 0 00 - 0 20 Thousand/uL    Lymphocytes Absolute 2 44 0 60 - 4 47 Thousands/µL    Monocytes Absolute 0 49 0 17 - 1 22 Thousand/µL    Eosinophils Absolute 0 10 0 00 - 0 61 Thousand/µL    Basophils Absolute 0 03 0 00 - 0 10 Thousands/µL   Comprehensive metabolic panel    Collection Time: 04/21/21 10:17 AM   Result Value Ref Range    Sodium 139 136 - 145 mmol/L    Potassium 4 1 3 5 - 5 3 mmol/L    Chloride 102 100 - 108 mmol/L    CO2 30 21 - 32 mmol/L    ANION GAP 7 4 - 13 mmol/L    BUN 18 5 - 25 mg/dL    Creatinine 0 87 0 60 - 1 30 mg/dL    Glucose, Fasting 88 65 - 99 mg/dL    Calcium 8 9 8 3 - 10 1 mg/dL    AST 15 5 - 45 U/L    ALT 17 12 - 78 U/L    Alkaline Phosphatase 51 46 - 116 U/L    Total Protein 7 2 6 4 - 8 2 g/dL    Albumin 3 7 3 5 - 5 0 g/dL    Total Bilirubin 0 46 0 20 - 1 00 mg/dL    eGFR 65 ml/min/1 73sq m   Lipid panel    Collection Time: 04/21/21 10:17 AM   Result Value Ref Range    Cholesterol 187 50 - 200 mg/dL    Triglycerides 72 <=150 mg/dL    HDL, Direct 66 >=40 mg/dL    LDL Calculated 107 (H) 0 - 100 mg/dL    Non-HDL-Chol (CHOL-HDL) 121 mg/dl   TSH, 3rd generation with Free T4 reflex    Collection Time: 04/21/21 10:17 AM   Result Value Ref Range    TSH 3RD GENERATON 1 026 0 358 - 3 740 uIU/mL   Vitamin D 1,25 dihydroxy    Collection Time: 04/21/21 10:17 AM   Result Value Ref Range    Vit D, 1,25-Dihydroxy 61 1 19 9 - 79 3 pg/mL       Assessment/Plan:    Benign essential hypertension  Hypertension remains well controlled on lisinopril/hydrochlorothiazide  Refill provided  Continue same  Re-evaluate 6 months    Primary osteoarthritis of right knee  Patient has had corticosteroid injection the past   Referral to Orthopedic surgery    Disc degeneration, lumbar  Longstanding history of DJD of the lumbar spine    In the past the patient    Vitamin D deficiency  Therapeutic vitamin-D level on over-the-counter supplementation  Repeat vitamin-D level to be done in 6 months    Mixed hyperlipidemia  Patient could not tolerate simvastatin  Placed back on to pravastatin  Patient is dealing with cost of medication  Repeat lipid profile to be done in 6 months    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  Referral to ophthalmologist for further evaluation  This is the only I that she has vision from  Colon cancer screening  Referral back to Gastroenterology for colonoscopy  Last colonoscopy was in 2016  Reportedly was due to have repeat done in 3 years  Is now been 5 years  Does have history of colon polyps    Medicare annual wellness visit, subsequent  Subsequent annual wellness visit completed          Problem List Items Addressed This Visit        Cardiovascular and Mediastinum    Benign essential hypertension     Hypertension remains well controlled on lisinopril/hydrochlorothiazide  Refill provided  Continue same  Re-evaluate 6 months         Relevant Medications    lisinopril-hydrochlorothiazide (PRINZIDE,ZESTORETIC) 20-25 MG per tablet    Other Relevant Orders    CBC and differential    TSH, 3rd generation with Free T4 reflex    RESOLVED: Essential hypertension    Relevant Medications    lisinopril-hydrochlorothiazide (PRINZIDE,ZESTORETIC) 20-25 MG per tablet    Other Relevant Orders    CBC and differential       Musculoskeletal and Integument    Disc degeneration, lumbar     Longstanding history of DJD of the lumbar spine  In the past the patient         Primary osteoarthritis of right knee     Patient has had corticosteroid injection the past   Referral to Orthopedic surgery         Relevant Orders    Ambulatory referral to Orthopedic Surgery       Other    Colon cancer screening     Referral back to Gastroenterology for colonoscopy  Last colonoscopy was in 2016  Reportedly was due to have repeat done in 3 years  Is now been 5 years    Does have history of colon polyps Relevant Orders    Ambulatory referral to Gastroenterology    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     Referral to ophthalmologist for further evaluation  This is the only I that she has vision from  Relevant Orders    Ambulatory referral to Ophthalmology    Medicare annual wellness visit, subsequent - Primary     Subsequent annual wellness visit completed         Mixed hyperlipidemia     Patient could not tolerate simvastatin  Placed back on to pravastatin  Patient is dealing with cost of medication  Repeat lipid profile to be done in 6 months         Relevant Orders    Comprehensive metabolic panel    Lipid panel    Vitamin D deficiency     Therapeutic vitamin-D level on over-the-counter supplementation    Repeat vitamin-D level to be done in 6 months         Relevant Orders    Vitamin D 1,25 dihydroxy

## 2021-04-26 NOTE — PATIENT INSTRUCTIONS
Medicare Preventive Visit Patient Instructions  Thank you for completing your Welcome to Medicare Visit or Medicare Annual Wellness Visit today  Your next wellness visit will be due in one year (4/27/2022)  The screening/preventive services that you may require over the next 5-10 years are detailed below  Some tests may not apply to you based off risk factors and/or age  Screening tests ordered at today's visit but not completed yet may show as past due  Also, please note that scanned in results may not display below  Preventive Screenings:  Service Recommendations Previous Testing/Comments   Colorectal Cancer Screening  * Colonoscopy    * Fecal Occult Blood Test (FOBT)/Fecal Immunochemical Test (FIT)  * Fecal DNA/Cologuard Test  * Flexible Sigmoidoscopy Age: 54-65 years old   Colonoscopy: every 10 years (may be performed more frequently if at higher risk)  OR  FOBT/FIT: every 1 year  OR  Cologuard: every 3 years  OR  Sigmoidoscopy: every 5 years  Screening may be recommended earlier than age 48 if at higher risk for colorectal cancer  Also, an individualized decision between you and your healthcare provider will decide whether screening between the ages of 74-80 would be appropriate  Colonoscopy: 04/11/2016  FOBT/FIT: Not on file  Cologuard: Not on file  Sigmoidoscopy: Not on file    Screening Current     Breast Cancer Screening Age: 36 years old  Frequency: every 1-2 years  Not required if history of left and right mastectomy Mammogram: Not on file    Risks and Benefits Discussed  Due for Mammogram   Cervical Cancer Screening Between the ages of 21-29, pap smear recommended once every 3 years  Between the ages of 33-67, can perform pap smear with HPV co-testing every 5 years     Recommendations may differ for women with a history of total hysterectomy, cervical cancer, or abnormal pap smears in past  Pap Smear: Not on file    Screening Not Indicated   Hepatitis C Screening Once for adults born between 1945 and 1965  More frequently in patients at high risk for Hepatitis C Hep C Antibody: Not on file    Screening Current   Diabetes Screening 1-2 times per year if you're at risk for diabetes or have pre-diabetes Fasting glucose: 88 mg/dL   A1C: No results in last 5 years    Screening Current   Cholesterol Screening Once every 5 years if you don't have a lipid disorder  May order more often based on risk factors  Lipid panel: 04/21/2021    Screening Not Indicated  History Lipid Disorder     Other Preventive Screenings Covered by Medicare:  1  Abdominal Aortic Aneurysm (AAA) Screening: covered once if your at risk  You're considered to be at risk if you have a family history of AAA  2  Lung Cancer Screening: covers low dose CT scan once per year if you meet all of the following conditions: (1) Age 50-69; (2) No signs or symptoms of lung cancer; (3) Current smoker or have quit smoking within the last 15 years; (4) You have a tobacco smoking history of at least 30 pack years (packs per day multiplied by number of years you smoked); (5) You get a written order from a healthcare provider  3  Glaucoma Screening: covered annually if you're considered high risk: (1) You have diabetes OR (2) Family history of glaucoma OR (3)  aged 48 and older OR (3)  American aged 72 and older  3  Osteoporosis Screening: covered every 2 years if you meet one of the following conditions: (1) You're estrogen deficient and at risk for osteoporosis based off medical history and other findings; (2) Have a vertebral abnormality; (3) On glucocorticoid therapy for more than 3 months; (4) Have primary hyperparathyroidism; (5) On osteoporosis medications and need to assess response to drug therapy  · Last bone density test (DXA Scan): Not on file  5  HIV Screening: covered annually if you're between the age of 12-76  Also covered annually if you are younger than 13 and older than 72 with risk factors for HIV infection   For pregnant patients, it is covered up to 3 times per pregnancy  Immunizations:  Immunization Recommendations   Influenza Vaccine Annual influenza vaccination during flu season is recommended for all persons aged >= 6 months who do not have contraindications   Pneumococcal Vaccine (Prevnar and Pneumovax)  * Prevnar = PCV13  * Pneumovax = PPSV23   Adults 25-60 years old: 1-3 doses may be recommended based on certain risk factors  Adults 72 years old: Prevnar (PCV13) vaccine recommended followed by Pneumovax (PPSV23) vaccine  If already received PPSV23 since turning 65, then PCV13 recommended at least one year after PPSV23 dose  Hepatitis B Vaccine 3 dose series if at intermediate or high risk (ex: diabetes, end stage renal disease, liver disease)   Tetanus (Td) Vaccine - COST NOT COVERED BY MEDICARE PART B Following completion of primary series, a booster dose should be given every 10 years to maintain immunity against tetanus  Td may also be given as tetanus wound prophylaxis  Tdap Vaccine - COST NOT COVERED BY MEDICARE PART B Recommended at least once for all adults  For pregnant patients, recommended with each pregnancy  Shingles Vaccine (Shingrix) - COST NOT COVERED BY MEDICARE PART B  2 shot series recommended in those aged 48 and above     Health Maintenance Due:      Topic Date Due    Hepatitis C Screening  Never done    Colonoscopy Surveillance  04/11/2019    Colorectal Cancer Screening  04/11/2026     Immunizations Due:  There are no preventive care reminders to display for this patient  Advance Directives   What are advance directives? Advance directives are legal documents that state your wishes and plans for medical care  These plans are made ahead of time in case you lose your ability to make decisions for yourself  Advance directives can apply to any medical decision, such as the treatments you want, and if you want to donate organs  What are the types of advance directives?   There are many types of advance directives, and each state has rules about how to use them  You may choose a combination of any of the following:  · Living will: This is a written record of the treatment you want  You can also choose which treatments you do not want, which to limit, and which to stop at a certain time  This includes surgery, medicine, IV fluid, and tube feedings  · Durable power of  for healthcare [facility]): This is a written record that states who you want to make healthcare choices for you when you are unable to make them for yourself  This person, called a proxy, is usually a family member or a friend  You may choose more than 1 proxy  · Do not resuscitate (DNR) order:  A DNR order is used in case your heart stops beating or you stop breathing  It is a request not to have certain forms of treatment, such as CPR  A DNR order may be included in other types of advance directives  · Medical directive: This covers the care that you want if you are in a coma, near death, or unable to make decisions for yourself  You can list the treatments you want for each condition  Treatment may include pain medicine, surgery, blood transfusions, dialysis, IV or tube feedings, and a ventilator (breathing machine)  · Values history: This document has questions about your views, beliefs, and how you feel and think about life  This information can help others choose the care that you would choose  Why are advance directives important? An advance directive helps you control your care  Although spoken wishes may be used, it is better to have your wishes written down  Spoken wishes can be misunderstood, or not followed  Treatments may be given even if you do not want them  An advance directive may make it easier for your family to make difficult choices about your care     Weight Management   Why it is important to manage your weight:  Being overweight increases your risk of health conditions such as heart disease, high blood pressure, type 2 diabetes, and certain types of cancer  It can also increase your risk for osteoarthritis, sleep apnea, and other respiratory problems  Aim for a slow, steady weight loss  Even a small amount of weight loss can lower your risk of health problems  How to lose weight safely:  A safe and healthy way to lose weight is to eat fewer calories and get regular exercise  You can lose up about 1 pound a week by decreasing the number of calories you eat by 500 calories each day  Healthy meal plan for weight management:  A healthy meal plan includes a variety of foods, contains fewer calories, and helps you stay healthy  A healthy meal plan includes the following:  · Eat whole-grain foods more often  A healthy meal plan should contain fiber  Fiber is the part of grains, fruits, and vegetables that is not broken down by your body  Whole-grain foods are healthy and provide extra fiber in your diet  Some examples of whole-grain foods are whole-wheat breads and pastas, oatmeal, brown rice, and bulgur  · Eat a variety of vegetables every day  Include dark, leafy greens such as spinach, kale, parveen greens, and mustard greens  Eat yellow and orange vegetables such as carrots, sweet potatoes, and winter squash  · Eat a variety of fruits every day  Choose fresh or canned fruit (canned in its own juice or light syrup) instead of juice  Fruit juice has very little or no fiber  · Eat low-fat dairy foods  Drink fat-free (skim) milk or 1% milk  Eat fat-free yogurt and low-fat cottage cheese  Try low-fat cheeses such as mozzarella and other reduced-fat cheeses  · Choose meat and other protein foods that are low in fat  Choose beans or other legumes such as split peas or lentils  Choose fish, skinless poultry (chicken or turkey), or lean cuts of red meat (beef or pork)  Before you cook meat or poultry, cut off any visible fat  · Use less fat and oil  Try baking foods instead of frying them   Add less fat, such as margarine, sour cream, regular salad dressing and mayonnaise to foods  Eat fewer high-fat foods  Some examples of high-fat foods include french fries, doughnuts, ice cream, and cakes  · Eat fewer sweets  Limit foods and drinks that are high in sugar  This includes candy, cookies, regular soda, and sweetened drinks  Exercise:  Exercise at least 30 minutes per day on most days of the week  Some examples of exercise include walking, biking, dancing, and swimming  You can also fit in more physical activity by taking the stairs instead of the elevator or parking farther away from stores  Ask your healthcare provider about the best exercise plan for you  © Copyright [employer] 2018 Information is for End User's use only and may not be sold, redistributed or otherwise used for commercial purposes   All illustrations and images included in CareNotes® are the copyrighted property of [employer]  or [address] [place] [address]

## 2021-08-31 DIAGNOSIS — M15.9 GENERALIZED OSTEOARTHRITIS: ICD-10-CM

## 2021-08-31 NOTE — TELEPHONE ENCOUNTER
Patient called and is requesting her Ibuprofen 600 mg be filled she admitted in the message that her bottle does say 2019 and had a refill however it   Patient also advised in message that she is using it because she has been having back pain and leg cramps  Did you want to see patient? She was last here in April

## 2021-09-01 RX ORDER — IBUPROFEN 600 MG/1
600 TABLET ORAL EVERY 6 HOURS PRN
Qty: 60 TABLET | Refills: 4 | Status: SHIPPED | OUTPATIENT
Start: 2021-09-01 | End: 2022-02-02 | Stop reason: ALTCHOICE

## 2021-10-26 ENCOUNTER — APPOINTMENT (OUTPATIENT)
Dept: LAB | Facility: CLINIC | Age: 76
End: 2021-10-26
Payer: MEDICARE

## 2021-10-26 DIAGNOSIS — E78.2 MIXED HYPERLIPIDEMIA: ICD-10-CM

## 2021-10-26 DIAGNOSIS — I10 BENIGN ESSENTIAL HYPERTENSION: ICD-10-CM

## 2021-10-26 DIAGNOSIS — I10 ESSENTIAL HYPERTENSION: ICD-10-CM

## 2021-10-26 DIAGNOSIS — E55.9 VITAMIN D DEFICIENCY: ICD-10-CM

## 2021-10-26 LAB
ALBUMIN SERPL BCP-MCNC: 3.9 G/DL (ref 3.5–5)
ALP SERPL-CCNC: 51 U/L (ref 46–116)
ALT SERPL W P-5'-P-CCNC: 23 U/L (ref 12–78)
ANION GAP SERPL CALCULATED.3IONS-SCNC: 9 MMOL/L (ref 4–13)
AST SERPL W P-5'-P-CCNC: 17 U/L (ref 5–45)
BASOPHILS # BLD AUTO: 0.03 THOUSANDS/ΜL (ref 0–0.1)
BASOPHILS NFR BLD AUTO: 0 % (ref 0–1)
BILIRUB SERPL-MCNC: 0.59 MG/DL (ref 0.2–1)
BUN SERPL-MCNC: 17 MG/DL (ref 5–25)
CALCIUM SERPL-MCNC: 9.2 MG/DL (ref 8.3–10.1)
CHLORIDE SERPL-SCNC: 98 MMOL/L (ref 100–108)
CHOLEST SERPL-MCNC: 198 MG/DL (ref 50–200)
CO2 SERPL-SCNC: 30 MMOL/L (ref 21–32)
CREAT SERPL-MCNC: 0.83 MG/DL (ref 0.6–1.3)
EOSINOPHIL # BLD AUTO: 0.09 THOUSAND/ΜL (ref 0–0.61)
EOSINOPHIL NFR BLD AUTO: 1 % (ref 0–6)
ERYTHROCYTE [DISTWIDTH] IN BLOOD BY AUTOMATED COUNT: 12.5 % (ref 11.6–15.1)
GFR SERPL CREATININE-BSD FRML MDRD: 69 ML/MIN/1.73SQ M
GLUCOSE P FAST SERPL-MCNC: 87 MG/DL (ref 65–99)
HCT VFR BLD AUTO: 38.8 % (ref 34.8–46.1)
HDLC SERPL-MCNC: 72 MG/DL
HGB BLD-MCNC: 12.3 G/DL (ref 11.5–15.4)
IMM GRANULOCYTES # BLD AUTO: 0.04 THOUSAND/UL (ref 0–0.2)
IMM GRANULOCYTES NFR BLD AUTO: 1 % (ref 0–2)
LDLC SERPL CALC-MCNC: 110 MG/DL (ref 0–100)
LYMPHOCYTES # BLD AUTO: 2.57 THOUSANDS/ΜL (ref 0.6–4.47)
LYMPHOCYTES NFR BLD AUTO: 38 % (ref 14–44)
MCH RBC QN AUTO: 31.5 PG (ref 26.8–34.3)
MCHC RBC AUTO-ENTMCNC: 31.7 G/DL (ref 31.4–37.4)
MCV RBC AUTO: 100 FL (ref 82–98)
MONOCYTES # BLD AUTO: 0.57 THOUSAND/ΜL (ref 0.17–1.22)
MONOCYTES NFR BLD AUTO: 9 % (ref 4–12)
NEUTROPHILS # BLD AUTO: 3.44 THOUSANDS/ΜL (ref 1.85–7.62)
NEUTS SEG NFR BLD AUTO: 51 % (ref 43–75)
NONHDLC SERPL-MCNC: 126 MG/DL
NRBC BLD AUTO-RTO: 0 /100 WBCS
PLATELET # BLD AUTO: 270 THOUSANDS/UL (ref 149–390)
PMV BLD AUTO: 10 FL (ref 8.9–12.7)
POTASSIUM SERPL-SCNC: 3.9 MMOL/L (ref 3.5–5.3)
PROT SERPL-MCNC: 7.3 G/DL (ref 6.4–8.2)
RBC # BLD AUTO: 3.9 MILLION/UL (ref 3.81–5.12)
SODIUM SERPL-SCNC: 137 MMOL/L (ref 136–145)
TRIGL SERPL-MCNC: 81 MG/DL
TSH SERPL DL<=0.05 MIU/L-ACNC: 0.76 UIU/ML (ref 0.36–3.74)
WBC # BLD AUTO: 6.74 THOUSAND/UL (ref 4.31–10.16)

## 2021-10-26 PROCEDURE — 80053 COMPREHEN METABOLIC PANEL: CPT

## 2021-10-26 PROCEDURE — 84443 ASSAY THYROID STIM HORMONE: CPT

## 2021-10-26 PROCEDURE — 80061 LIPID PANEL: CPT

## 2021-10-26 PROCEDURE — 85025 COMPLETE CBC W/AUTO DIFF WBC: CPT

## 2021-10-26 PROCEDURE — 82652 VIT D 1 25-DIHYDROXY: CPT

## 2021-10-26 PROCEDURE — 36415 COLL VENOUS BLD VENIPUNCTURE: CPT

## 2021-10-28 LAB — 1,25(OH)2D3 SERPL-MCNC: 42.4 PG/ML (ref 19.9–79.3)

## 2021-11-01 ENCOUNTER — OFFICE VISIT (OUTPATIENT)
Dept: FAMILY MEDICINE CLINIC | Facility: CLINIC | Age: 76
End: 2021-11-01
Payer: MEDICARE

## 2021-11-01 VITALS
HEART RATE: 74 BPM | RESPIRATION RATE: 16 BRPM | DIASTOLIC BLOOD PRESSURE: 74 MMHG | SYSTOLIC BLOOD PRESSURE: 122 MMHG | BODY MASS INDEX: 23.78 KG/M2 | HEIGHT: 66 IN | OXYGEN SATURATION: 97 % | WEIGHT: 148 LBS

## 2021-11-01 DIAGNOSIS — I10 ESSENTIAL HYPERTENSION: ICD-10-CM

## 2021-11-01 DIAGNOSIS — Z23 FLU VACCINE NEED: ICD-10-CM

## 2021-11-01 DIAGNOSIS — E78.2 MIXED HYPERLIPIDEMIA: ICD-10-CM

## 2021-11-01 DIAGNOSIS — I10 BENIGN ESSENTIAL HYPERTENSION: Primary | ICD-10-CM

## 2021-11-01 DIAGNOSIS — E55.9 VITAMIN D DEFICIENCY: ICD-10-CM

## 2021-11-01 PROCEDURE — 99214 OFFICE O/P EST MOD 30 MIN: CPT | Performed by: FAMILY MEDICINE

## 2021-11-01 PROCEDURE — 90662 IIV NO PRSV INCREASED AG IM: CPT | Performed by: FAMILY MEDICINE

## 2021-11-01 PROCEDURE — G0008 ADMIN INFLUENZA VIRUS VAC: HCPCS | Performed by: FAMILY MEDICINE

## 2021-11-01 RX ORDER — LISINOPRIL AND HYDROCHLOROTHIAZIDE 25; 20 MG/1; MG/1
1 TABLET ORAL DAILY
Qty: 90 TABLET | Refills: 1 | Status: SHIPPED | OUTPATIENT
Start: 2021-11-01 | End: 2022-05-09 | Stop reason: SDUPTHER

## 2021-11-01 RX ORDER — PRAVASTATIN SODIUM 20 MG
20 TABLET ORAL
Qty: 90 TABLET | Refills: 1 | Status: SHIPPED | OUTPATIENT
Start: 2021-11-01 | End: 2022-05-09 | Stop reason: SDUPTHER

## 2022-02-02 ENCOUNTER — HOSPITAL ENCOUNTER (OUTPATIENT)
Dept: RADIOLOGY | Facility: HOSPITAL | Age: 77
Discharge: HOME | End: 2022-02-02
Payer: OTHER MISCELLANEOUS

## 2022-02-02 ENCOUNTER — TELEPHONE (OUTPATIENT)
Dept: OBGYN CLINIC | Facility: HOSPITAL | Age: 77
End: 2022-02-02

## 2022-02-02 ENCOUNTER — OFFICE VISIT (OUTPATIENT)
Dept: OBGYN CLINIC | Facility: CLINIC | Age: 77
End: 2022-02-02
Payer: OTHER MISCELLANEOUS

## 2022-02-02 VITALS — BODY MASS INDEX: 23.88 KG/M2 | WEIGHT: 148.6 LBS | HEIGHT: 66 IN
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
## 2022-02-02 DIAGNOSIS — M25.571 PAIN, JOINT, ANKLE AND FOOT, RIGHT: ICD-10-CM

## 2022-02-02 DIAGNOSIS — M48.061 SPINAL STENOSIS OF LUMBAR REGION, UNSPECIFIED WHETHER NEUROGENIC CLAUDICATION PRESENT: ICD-10-CM

## 2022-02-02 DIAGNOSIS — R20.0 NUMBNESS AND TINGLING OF RIGHT ARM: ICD-10-CM

## 2022-02-02 DIAGNOSIS — R20.0 NUMBNESS AND TINGLING IN LEFT ARM: ICD-10-CM

## 2022-02-02 DIAGNOSIS — M54.12 CERVICAL NEURITIS: ICD-10-CM

## 2022-02-02 DIAGNOSIS — F40.240 CLAUSTROPHOBIA: ICD-10-CM

## 2022-02-02 DIAGNOSIS — R20.2 NUMBNESS AND TINGLING OF RIGHT ARM: ICD-10-CM
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
## 2022-02-02 DIAGNOSIS — R20.2 NUMBNESS AND TINGLING IN LEFT ARM: ICD-10-CM

## 2022-02-02 PROCEDURE — 72040 X-RAY EXAM NECK SPINE 2-3 VW: CPT

## 2022-02-02 PROCEDURE — 72100 X-RAY EXAM L-S SPINE 2/3 VWS: CPT

## 2022-02-02 PROCEDURE — 73590 X-RAY EXAM OF LOWER LEG: CPT

## 2022-02-02 PROCEDURE — 99203 OFFICE O/P NEW LOW 30 MIN: CPT | Performed by: PHYSICIAN ASSISTANT

## 2022-02-02 RX ORDER — LORAZEPAM 1 MG/1
1 TABLET ORAL ONCE
Qty: 1 TABLET | Refills: 0 | Status: SHIPPED | OUTPATIENT
Start: 2022-02-02 | End: 2022-03-14

## 2022-02-02 RX ORDER — METHYLPREDNISOLONE 4 MG/1
TABLET ORAL
Qty: 1 EACH | Refills: 0 | Status: SHIPPED | OUTPATIENT
Start: 2022-02-02 | End: 2022-03-14

## 2022-02-02 NOTE — PROGRESS NOTES
Assessment/Plan   Diagnoses and all orders for this visit:    Fall, initial encounter    Cervical neuritis  -     MRI cervical spine wo contrast; Future  -     Ambulatory Referral to Physical Therapy; Future  -     Steroid taper rx  -     Ambulatory Referral to Spine & Pain    Spinal stenosis of lumbar region, unspecified whether neurogenic claudication present  -     Ambulatory Referral to Physical Therapy; Future  -     Ambulatory Referral to Spine & Pain          Subjective   Patient ID: Tree Ramirez is a 68 y o  female  Vitals:     74yo female comes in for an evaluation of her right ankle and b/l arms  History of spinal stenosis  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  She fell on 1-21-22  She was walking behind her client as he started to go up the stairs  He fell backwards into her, causing her to fall backwards into a closet door  She c/o pain and tingling that radiate down the left arm to the fingers  She has constant tingling in the index and thumb  She has intermittent tingling in all 10 fingers that is provoked by neck motion  No weakness or dropping things  She also c/o pain in the right SI area with tingling dysesthesias of the lateral aspect of the leg, knee to foot  She has numbness in all her toes which is baseline for her  She has not noticed weakness or bowel/bladder changes  Her spine specialist was in [place] - she does not have one here yet        The following portions of the patient's history were reviewed and updated as appropriate: allergies, current medications, past family history, past medical history, past social history, past surgical history and problem list     Review of Systems  Ortho Exam  Past Medical History:   Diagnosis Date    Arthritis     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, unspecified     Last assessed: 10/17/13    Colon polyps     Depression     Hyperlipidemia     Hypertension     Lumbago     last assessed: 1/15/14    Menopause     Neuropathy     Ptosis, left eyelid     last assessed: 10/16/15    Shortness of breath     Vertigo     last assessed: 10/26/15     Past Surgical History:   Procedure Laterality Date    BLADDER SURGERY      HYSTERECTOMY      DC COLONOSCOPY FLX DX W/COLLJ SPEC WHEN PFRMD N/A 4/11/2016    Procedure: COLONOSCOPY;  Surgeon: Nichole Davis MD;  [facility]; Service: Gastroenterology     Family History   Problem Relation Age of Onset    Heart disease Other         cardiac disorder     Social History     Occupational History    Not on file   Tobacco Use    Smoking status: Never Smoker    Smokeless tobacco: Never Used   Vaping Use    Vaping Use: Never used   Substance and Sexual Activity    Alcohol use: Yes     Comment: social, rarely    Drug use: No    Sexual activity: Not on file       Review of Systems   Constitutional: Negative  HENT: Negative  Eyes: Negative  Respiratory: Negative  Cardiovascular: Negative  Gastrointestinal: Negative  Endocrine: Negative  Genitourinary: Negative  Musculoskeletal: As below      Allergic/Immunologic: Negative  Neurological: Negative  Hematological: Negative  Psychiatric/Behavioral: Negative  Objective   Physical Exam        I have personally reviewed pertinent films in PACS and my interpretation is CSpine and LSpine with degenerative changes on Xray  No acute displaced fracture  Ankle no acute displaced fracture on xray         · Constitutional: Awake, Alert, Oriented  · Eyes: open, asymmetric  · Psych: Mood and affect appropriate  · Heart: regular rate   · Lungs: No audible wheezing  · Abdomen: No guarding  · Lymph: no lymphedema             right ankle:  - Appearance   No swelling, discoloration, deformity, or ecchymosis    - Palpation   No tenderness about the medial / lateral malleoli, deltoid, proximal fibula, atf, cf, ptf, talus, achilles or 5th MT  - ROM   Full, pain-free, active ROM  - Special Tests   Negative anterior drawer  - Motor   normal 5/5 in all planes  - NVI distally         left Neck / CSpine:  - Appearance   No rash, erythema, or ecchymosis  - Palpation   No tenderness to palpation of the midline bony landmarks, paraspinal musculature, Trapezius  - ROM   Pain, limitation with flexion, extension, and rotation to the left  - Motor   5/5 in all UE myotomes  - Sensation   Intact to light touch in all UE dermatomes except: altered in the left index finger and left thumb   - DTRs   1+ and symmetric at biceps, triceps, and brachioradialis  - Special Tests   Spurlings positive         right Low back / lumbar spine:  - Appearance   No rash, erythema, or ecchymosis  Note: she c/o a recent rash on the left low back, but I do not see it now  - Palpation   No tenderness to palpation of the midline bony landmarks, paraspinal musculature    Mild tenderness right SI joint  - ROM   Pain-free active ROM  - Motor   5/5 in all LE myotomes  - Sensation   Diminished/altered in all toes, dorsal foot, lateral ankle, lateral lower leg  - DTRs   1+ and symmetric   - Special Tests   SLR negative

## 2022-02-02 NOTE — TELEPHONE ENCOUNTER
Patient is calling to request a work note stating that she is not able to return to work at this time from [employer]  Please fax to [phone], [place]    Please call the patient to advise        Evie Holman [phone]

## 2022-02-04 ENCOUNTER — TELEPHONE (OUTPATIENT)
Dept: OBGYN CLINIC | Facility: HOSPITAL | Age: 77
End: 2022-02-04

## 2022-02-04 NOTE — TELEPHONE ENCOUNTER
Pt sees Jose De Jesus Johnston    Pt is calling stating that she would like to speak to Miranda Apple in ref to medication she was given    WA#[phone]

## 2022-02-07 ENCOUNTER — TELEPHONE (OUTPATIENT)
Dept: OBGYN CLINIC | Facility: HOSPITAL | Age: 77
End: 2022-02-07

## 2022-02-07 NOTE — TELEPHONE ENCOUNTER
Patient called to advise she is to schedule an MRI  She advised the order is only for her neck  She said her right hip and right ankle are worse and she would like it all MRI at the same time  Patient would like a call at [phone]   Thank you

## 2022-02-10 DIAGNOSIS — M54.16 RADICULOPATHY, LUMBAR REGION: Primary | ICD-10-CM

## 2022-02-10 NOTE — PROGRESS NOTES
Returned patient's call  The steroid taper was temporarily helpful and she is now back on NSAIDs  She wants to have the lspine MRI also because she is still getting leg symptoms  Ordered  Regarding the cspine MRI she states she got a text asking about her availability for scheduling, but did not return a message yet  Regarding the Spine & Pain visit, she states she got paperwork from Dr Fabiola Capellan office, but did not fill it out yet  I encouraged her to do these things as planned

## 2022-02-14 ENCOUNTER — TELEPHONE (OUTPATIENT)
Dept: OBGYN CLINIC | Facility: OTHER | Age: 77
End: 2022-02-14

## 2022-02-28 ENCOUNTER — TELEPHONE (OUTPATIENT)
Dept: FAMILY MEDICINE CLINIC | Facility: CLINIC | Age: 77
End: 2022-02-28

## 2022-02-28 NOTE — TELEPHONE ENCOUNTER
Patient called regarding Ibuprofen 600 mg  Patient apparently had an injury at work and was given this medication from [facility]  This was discontinued by Kinjal Rahman and patient was advised to take Tylenol with a steroid taper per note says she can go back on it after that is done  Patient is following with pain management on 03/13/ 2022 but advised in the message that the Tylenol is not working for her pain  Should patient be reaching out to Ortho?

## 2022-02-28 NOTE — TELEPHONE ENCOUNTER
She should be contacting ortho    She has not established with pain management yet so she can contact them and I have nothing to do with workman's comp claim

## 2022-03-14 ENCOUNTER — CONSULT (OUTPATIENT)
Dept: PAIN MEDICINE | Facility: CLINIC | Age: 77
End: 2022-03-14
Payer: MEDICARE

## 2022-03-14 VITALS
SYSTOLIC BLOOD PRESSURE: 142 MMHG | BODY MASS INDEX: 23.73 KG/M2 | DIASTOLIC BLOOD PRESSURE: 75 MMHG | HEART RATE: 83 BPM | WEIGHT: 147 LBS

## 2022-03-14 DIAGNOSIS — M48.02 CERVICAL SPINAL STENOSIS: ICD-10-CM

## 2022-03-14 DIAGNOSIS — M51.16 INTERVERTEBRAL DISC DISORDER WITH RADICULOPATHY OF LUMBAR REGION: ICD-10-CM

## 2022-03-14 DIAGNOSIS — M50.120 CERVICAL DISC DISORDER WITH RADICULOPATHY OF MID-CERVICAL REGION: Primary | ICD-10-CM

## 2022-03-14 PROCEDURE — 99204 OFFICE O/P NEW MOD 45 MIN: CPT | Performed by: ANESTHESIOLOGY

## 2022-03-14 RX ORDER — IBUPROFEN 600 MG/1
600 TABLET ORAL 3 TIMES DAILY PRN
Qty: 90 TABLET | Refills: 2 | Status: SHIPPED | OUTPATIENT
Start: 2022-03-14

## 2022-03-14 RX ORDER — GABAPENTIN 100 MG/1
CAPSULE ORAL
Qty: 60 CAPSULE | Refills: 1 | Status: SHIPPED | OUTPATIENT
Start: 2022-03-14 | End: 2022-05-31

## 2022-03-14 NOTE — PROGRESS NOTES
Assessment  1  Cervical disc disorder with radiculopathy of mid-cervical region    2  Cervical spinal stenosis    3  Intervertebral disc disorder with radiculopathy of lumbar region        Plan  The patient's symptoms, history/physical are consistent with pain that is multifactorial in origin but predominantly result of her spinal stenosis at C5-6 and C6-7 which is leading to left-sided radicular symptoms as well as likely carpal tunnel syndrome  At this time, I discussed performing cervical epidural steroid injection to help reduce swelling inflammation which is leading to the pain symptoms  She would like to proceed and will be scheduled for an upcoming Tuesday or Thursday under fluoroscopic guidance     In addition, will order an EMG of the bilateral upper extremities to evaluate for cervical radiculopathy versus median nerve entrapment  I will refill her ibuprofen 600 mg 3 times a day as needed since that has been helpful for her  She was instructed to take with meals  I will also start on gabapentin 100 mg at bedtime to help with the radicular symptoms  In regards to the lower back and right-sided leg symptoms this is secondary to the stenosis in her lumbar spine at L4-5  Symptoms have improved significantly on their own and will just monitor for now  Complete risks and benefits including bleeding, infection, tissue reaction, nerve injury and allergic reaction were discussed  The approach was demonstrated using models and literature was provided  Verbal and written consent was obtained  My impressions and treatment recommendations were discussed in detail with the patient who verbalized understanding and had no further questions  Discharge instructions were provided  I personally saw and examined the patient and I agree with the above discussed plan of care      Orders Placed This Encounter   Procedures    [place] spine and pain procedure     Standing Status:   Future     Standing Expiration Date: 3/14/2026     Order Specific Question:   Reason for Exam:     Answer:   JACQUES     Order Specific Question:   Anticoagulant hold needed? Answer:   No    EMG 2 Limb Upper Extremity     Standing Status:   Future     Standing Expiration Date:   3/14/2023     Order Specific Question:   Possible Diagnosis: Answer:   cervical neuropathy     Order Specific Question:   Possible Diagnosis: Answer:   median neuropathy (carpal tunnel syndrome)     New Medications Ordered This Visit   Medications    ibuprofen (MOTRIN) 600 mg tablet     Sig: Take 1 tablet (600 mg total) by mouth 3 (three) times a day as needed for mild pain     Dispense:  90 tablet     Refill:  2    gabapentin (NEURONTIN) 100 mg capsule     Sig: Take 1 tablet at bedtime  May increase to 2 tablets after 3 days     Dispense:  60 capsule     Refill:  1       History of Present Illness    Sunni Powers is a 68 y o  female referred by Dr Silviano Burton for neck and left-sided arm pain as well as lower back and right-sided leg pain following a fall down stairs on January 21, 2022  Current symptoms are moderate to severe rated 6-10/10 on numeric rating scale and felt nearly constantly in the neck traveling down the left arm and occasionally in the lower back traveling down the right leg  Symptoms are described to be burning cramping shooting sharp with numbness into the left arm and hand and right foot and ankle  Pain symptoms are aggravated primarily physical activity and turning her head  Treatment history has included a steroid pack which was helpful  Heat provides relief  She takes Tylenol ibuprofen as needed which has been helpful  I have personally reviewed and/or updated the patient's past medical history, past surgical history, family history, social history, current medications, allergies, and vital signs today  Review of Systems   Constitutional: Positive for chills and unexpected weight change  Negative for fever     HENT: Negative for trouble swallowing  Eyes: Positive for visual disturbance  Respiratory: Negative for shortness of breath and wheezing  Cardiovascular: Negative for chest pain and palpitations  Gastrointestinal: Negative for constipation, diarrhea, nausea and vomiting  Endocrine: Negative for cold intolerance, heat intolerance and polydipsia  Genitourinary: Positive for frequency  Negative for difficulty urinating  Musculoskeletal: Positive for back pain and joint swelling  Negative for arthralgias, gait problem and myalgias  Skin: Negative for rash  Neurological: Positive for numbness and headaches  Negative for dizziness, seizures, syncope and weakness  Hematological: Does not bruise/bleed easily  Psychiatric/Behavioral: Positive for decreased concentration  Negative for dysphoric mood  All other systems reviewed and are negative        Patient Active Problem List   Diagnosis    Benign essential hypertension    Benign neoplasm of bone    Disc degeneration, lumbar    Facial nerve disorder    Generalized osteoarthritis    Mixed hyperlipidemia    Symptomatic menopausal or female climacteric states    Polyp of sigmoid colon    Primary osteoarthritis of right knee    Vitamin D deficiency    Breast cancer screening    Flu vaccine need    Medicare annual wellness visit, subsequent    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    Exposure to COVID-19 virus    Colon cancer screening       Past Medical History:   Diagnosis Date    Arthritis     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, unspecified     Last assessed: 10/17/13    Colon polyps     Depression     Hyperlipidemia     Hypertension     Lumbago     last assessed: 1/15/14    Menopause     Neuropathy     Ptosis, left eyelid     last assessed: 10/16/15    Shortness of breath     Vertigo     last assessed: 10/26/15       Past Surgical History:   Procedure Laterality Date    BLADDER SURGERY      HYSTERECTOMY      [place] COLONOSCOPY FLX DX W/COLLJ SPEC WHEN PFRMD N/A 4/11/2016    Procedure: COLONOSCOPY;  Surgeon: Lora Santos MD;  [facility]; Service: Gastroenterology       Family History   Problem Relation Age of Onset    Heart disease Other         cardiac disorder       Social History     Occupational History    Not on file   Tobacco Use    Smoking status: Never Smoker    Smokeless tobacco: Never Used   Vaping Use    Vaping Use: Never used   Substance and Sexual Activity    Alcohol use: Yes     Comment: social, rarely    Drug use: No    Sexual activity: Not on file       Current Outpatient Medications on File Prior to Visit   Medication Sig    acetaminophen (TYLENOL) 500 mg tablet Take 500 mg by mouth every 6 (six) hours as needed for mild pain   Cholecalciferol (VITAMIN D3) 5000 units CAPS Take 2,000 Units by mouth every other day      lisinopril-hydrochlorothiazide (PRINZIDE,ZESTORETIC) 20-25 MG per tablet Take 1 tablet by mouth daily    pravastatin (PRAVACHOL) 20 mg tablet Take 1 tablet (20 mg total) by mouth daily at bedtime    [DISCONTINUED] LORazepam (ATIVAN) 1 mg tablet Take 1 tablet (1 mg total) by mouth 1 (one) time for 1 dose Take 30 minutes before MRI if needed  Do not drive after taking this medication   [DISCONTINUED] methylPREDNISolone 4 MG tablet therapy pack Use as directed on package     No current facility-administered medications on file prior to visit  Allergies   Allergen Reactions    Benzoyl Peroxide Rash       Physical Exam    /75   Pulse 83   Wt 66 7 kg (147 lb)   BMI 23 73 kg/m²     Constitutional: normal, well developed, well nourished, alert, in no distress and non-toxic and no overt pain behavior    Eyes: anicteric  HEENT: grossly intact  Neck: supple, symmetric, trachea midline and no masses   Pulmonary:even and unlabored  Cardiovascular:No edema or pitting edema present  Skin:Normal without rashes or lesions and well hydrated  Psychiatric:Mood and affect appropriate  Neurologic:Cranial Nerves II-XII grossly intact  Musculoskeletal:normal     Cervical Spine Exam  Appearance:  Normal lordosis  Palpation/Tenderness:  left cervical paraspinal tenderness  Range of Motion:  Flexion:  Minimally limited  with pain  Extension:  Moderately limited  with pain  Rotation - Left:  Moderately limited  with pain  Rotation - Right:  Minimally limited  with pain  Motor Strength:  Left Arm Flexion  5/5  Left Arm Extension  5/5  Right Arm Flexion  5/5  Right Arm Extension  5/5  Left Wrist Flexion  5/5  Left Wrist Extension  5/5  Left Finger Abduction  5/5  Right Finger Abduction  5/5  Left Pincer Grasp  5/5  Right Pincer Grasp  5/5  Reflexes:  Left Biceps:  1+   Right Biceps:  2+   Left Triceps:  1+   Right Triceps:  2+   Special Tests:  Left Spurlings:  positive  Right Spurlings  negative    Imaging    MRI cervical spine (2/12/2022)  C5-6: Disc degeneration with annular disc bulge  A centric annular bulge to the left with mild central canal stenosis and bilateral neural foraminal narrowing     C6-7:  Central inferior disc extrusion with moderate central canal narrowing    MRI lumbar spine (2/16/2022)  L2-3: Facet degeneration ligamentum flavum hypertrophy with annular disc bulge with moderate central canal stenosis     L3-4:  Annular disc bulge with facet degeneration ligamentum flavum hypertrophy with moderate central canal stenosis mild to moderate neural foraminal narrowing     L4-5:  Disc degeneration with moderate disc bulge and ligamentum flavum hypertrophy  Moderate to severe central stenosis with mild-to-moderate bilateral neural foraminal narrowing     L5-S1:  Mild disc degeneration  XR LUMBAR SPINE     INDICATION:   W19  XXXA: Unspecified fall, initial encounter      COMPARISON:  No prior radiographs stable for comparison    Correlation CT abdomen pelvis October 14, 2014     VIEWS:  XR SPINE LUMBAR 2 OR 3 VIEWS INJURY        FINDINGS:     There are 5 non rib bearing lumbar vertebral bodies       There is no evidence of acute fracture or destructive osseous lesion      Minimal dextrocurvature of the lumbar spine centered at L3  Alignment is otherwise unremarkable       Overall mild multilevel discogenic degenerative change of the lumbar spine with mild intervertebral disc space narrowing  There is facet arthrosis of the lower lumbar spine      Soft tissues are unremarkable      IMPRESSION:     Mild multilevel degenerative change of the lumbar spine    No acute osseous abnormality

## 2022-03-14 NOTE — PATIENT INSTRUCTIONS
Gabapentin (By mouth)   Gabapentin (iliana-a-PEN-tin)  Treats seizures and pain caused by shingles  Brand Name(s): FusePaq Fanatrex, Neurontin   There may be other brand names for this medicine  When This Medicine Should Not Be Used: This medicine is not right for everyone  Do not use it if you had an allergic reaction to gabapentin  How to Use This Medicine:   Capsule, Liquid, Tablet  · Take your medicine as directed  Your dose may need to be changed several times to find what works best for you  If you have epilepsy, do not allow more than 12 hours to pass between doses  · Capsule: Swallow the capsule whole with plenty of water  Do not open, crush, or chew it  · Gralise® tablet: Swallow the tablet whole   Do not crush, break, or chew it  · Neurontin® tablet: If you break a tablet into 2 pieces, use the second half as your next dose  Do not use the half-tablet if the whole tablet has been cut or broken after 28 days  · Oral liquid: Measure the oral liquid medicine with a marked measuring spoon, oral syringe, or medicine cup  · This medicine should come with a Medication Guide  Ask your pharmacist for a copy if you do not have one  · Missed dose: Take a dose as soon as you remember  If it is almost time for your next dose, wait until then and take a regular dose  Do not take extra medicine to make up for a missed dose  · Store the medicine in a closed container at room temperature, away from heat, moisture, and direct light  Store the Neurontin® oral liquid in the refrigerator  Do not freeze  Drugs and Foods to Avoid:   Ask your doctor or pharmacist before using any other medicine, including over-the-counter medicines, vitamins, and herbal products  · Some medicines can affect how gabapentin works  Tell your doctor if you also using hydrocodone or morphine  · If you take an antacid, wait at least 2 hours before you take gabapentin  · Do not drink alcohol while you are using this medicine    · Tell your doctor if you use anything else that makes you sleepy  Some examples are allergy medicine, narcotic pain medicine, and alcohol  Tell your doctor if you are also using lorazepam, oxycodone, or zolpidem  Warnings While Using This Medicine:   · Tell your doctor if you are pregnant or breastfeeding, or if you have kidney problems (including patients receiving dialysis) or lung problems  Tell your doctor if you have a history of depression or mental health problems  · This medicine may cause the following problems:  ? Drug reaction with eosinophilia and systemic symptoms (DRESS) or multiorgan hypersensitivity, which may damage the liver, kidney, blood, heart, or muscles  ? Changes in mood or behavior, including suicidal thoughts or behavior  ? Respiratory depression (serious breathing problem that can be life-threatening), when used with narcotic pain medicines  · Do not stop using this medicine suddenly  Your doctor will need to slowly decrease your dose before you stop it completely  · This medicine may make you dizzy or drowsy  Do not drive or do anything else that could be dangerous until you know how this medicine affects you  · Tell any doctor or dentist who treats you that you are using this medicine  This medicine may affect certain medical test results  · Your doctor will check your progress and the effects of this medicine at regular visits  Keep all appointments  · Keep all medicine out of the reach of children  Never share your medicine with anyone    Possible Side Effects While Using This Medicine:   Call your doctor right away if you notice any of these side effects:  · Allergic reaction: Itching or hives, swelling in your face or hands, swelling or tingling in your mouth or throat, chest tightness, trouble breathing  · Behavior problems, aggression, restlessness, trouble concentrating, moodiness (especially in children)  · Blistering, peeling, red skin rash  · Blue lips, fingernails, or skin, chest pain, fast heartbeat, trouble breathing  · Change in how much or how often you urinate, bloody or cloudy urine  · Dark urine or pale stools, nausea, vomiting, loss of appetite, stomach pain, yellow skin or eyes  · Fever, chills, cough, sore throat, body aches  · Problems with coordination, shakiness, unsteadiness, unusual eye movement  · Rapid weight gain, swelling in your hands, ankles, or feet  · Rash, swollen or tender glands in the neck, armpit, or groin  · Unusual moods or behaviors, thoughts of hurting yourself, feeling depressed  If you notice these less serious side effects, talk with your doctor:   · Dizziness, drowsiness, sleepiness, tiredness  If you notice other side effects that you think are caused by this medicine, tell your doctor  Call your doctor for medical advice about side effects  You may report side effects to FDA at 0-669-FDA-4182    © Copyright [employer] [facility] 2022 Information is for End User's use only and may not be sold, redistributed or otherwise used for commercial purposes  The above information is an  only  It is not intended as medical advice for individual conditions or treatments  Talk to your doctor, nurse or pharmacist before following any medical regimen to see if it is safe and effective for you

## 2022-03-15 ENCOUNTER — TELEPHONE (OUTPATIENT)
Dept: PAIN MEDICINE | Facility: CLINIC | Age: 77
End: 2022-03-15

## 2022-03-15 NOTE — TELEPHONE ENCOUNTER
Pt is scheduled for JACQUES for 4/1/22   Pt denies rx blood thinners  Pt is taking ibuprofen and was instructed to hold for 1 day with last dose on 3/30/22  Went over pre-procedure instructions below:  Nothing to eat or drink 1 hr prior to procedure  Need to arrange transportation  Proper clothing for procedure  If ill or placed on antibiotics please call to reschedule  Vaccine instructions

## 2022-03-17 ENCOUNTER — TELEPHONE (OUTPATIENT)
Dept: OBGYN CLINIC | Facility: OTHER | Age: 77
End: 2022-03-17

## 2022-03-17 ENCOUNTER — TELEPHONE (OUTPATIENT)
Dept: PAIN MEDICINE | Facility: CLINIC | Age: 77
End: 2022-03-17

## 2022-03-17 NOTE — LETTER
March 18, 2022     Patient: Glynn Uriarte   YOB: 1945   Date of Visit: 3/17/2022       To Whom it May Concern:    Theador Reach is under my professional care  may return to work with limitations Of no excessive bending, lifting, twisting, pushing, pulling greater than 10 lb  If you have any questions or concerns, please don't hesitate to call           Sincerely,          Jessenia Gorman MD        CC: No Recipients

## 2022-03-17 NOTE — TELEPHONE ENCOUNTER
W/C  Leyla Wolf calling for work status it was not addressed in Consult       Please fax note to [phone]

## 2022-03-18 NOTE — TELEPHONE ENCOUNTER
S/w  Asha Reardon, made aware of FQ's notation  Asha Reardon requesting signed note from [facility] [address] stating the same  RN will fax to [phone] once note is completed  Thank you

## 2022-03-21 ENCOUNTER — TELEPHONE (OUTPATIENT)
Dept: PAIN MEDICINE | Facility: MEDICAL CENTER | Age: 77
End: 2022-03-21

## 2022-03-21 NOTE — TELEPHONE ENCOUNTER
Pt called stating that the location she was sent to for the EMG is not in service the machine is broken   Pt needs help finding a new location     Please advise

## 2022-03-21 NOTE — TELEPHONE ENCOUNTER
S/w pt, she said she had an appt for an EMG coming up but they called her and told her their machine is broken  Pt said she is going to try one of the other facilities that [facility] gave her

## 2022-04-01 ENCOUNTER — HOSPITAL ENCOUNTER (OUTPATIENT)
Dept: RADIOLOGY | Facility: CLINIC | Age: 77
Discharge: HOME | End: 2022-04-01
Attending: ANESTHESIOLOGY | Admitting: ANESTHESIOLOGY
Payer: OTHER MISCELLANEOUS

## 2022-04-01 VITALS
OXYGEN SATURATION: 95 % | DIASTOLIC BLOOD PRESSURE: 78 MMHG | SYSTOLIC BLOOD PRESSURE: 136 MMHG | TEMPERATURE: 96.8 F | RESPIRATION RATE: 20 BRPM | HEART RATE: 76 BPM

## 2022-04-01 DIAGNOSIS — M48.02 CERVICAL SPINAL STENOSIS: ICD-10-CM

## 2022-04-01 DIAGNOSIS — M50.120 CERVICAL DISC DISORDER WITH RADICULOPATHY OF MID-CERVICAL REGION: ICD-10-CM

## 2022-04-01 PROCEDURE — 62321 NJX INTERLAMINAR CRV/THRC: CPT | Performed by: ANESTHESIOLOGY

## 2022-04-01 RX ORDER — METHYLPREDNISOLONE ACETATE 80 MG/ML
80 INJECTION, SUSPENSION INTRA-ARTICULAR; INTRALESIONAL; INTRAMUSCULAR; PARENTERAL; SOFT TISSUE ONCE
Status: COMPLETED | OUTPATIENT
Start: 2022-04-01 | End: 2022-04-01

## 2022-04-01 RX ADMIN — IOHEXOL 1 ML: 300 INJECTION, SOLUTION INTRAVENOUS at 10:59

## 2022-04-01 RX ADMIN — METHYLPREDNISOLONE ACETATE 80 MG: 80 INJECTION, SUSPENSION INTRA-ARTICULAR; INTRALESIONAL; INTRAMUSCULAR; PARENTERAL; SOFT TISSUE at 11:00

## 2022-04-01 NOTE — DISCHARGE INSTR - LAB
Epidural Steroid Injection   WHAT YOU NEED TO KNOW:   An epidural steroid injection (OLVIN) is a procedure to inject steroid medicine into the epidural space  The epidural space is between your spinal cord and vertebrae  Steroids reduce inflammation and fluid buildup in your spine that may be causing pain  You may be given pain medicine along with the steroids  ACTIVITY  Do not drive or operate machinery today  No strenuous activity today - bending, lifting, etc   You may resume normal activites starting tomorrow - start slowly and as tolerated  You may shower today, but no tub baths or hot tubs  You may have numbness for several hours from the local anesthetic  Please use caution and common sense, especially with weight-bearing activities  CARE OF THE INJECTION SITE  If you have soreness or pain, apply ice to the area today (20 minutes on/20 minutes off)  Starting tomorrow, you may use warm, moist heat or ice if needed  You may have an increase or change in your discomfort for 36-48 hours after your treatment  Apply ice and continue with any pain medication you have been prescribed  Notify [facility] if you have any of the following: redness, drainage, swelling, headache, stiff neck or fever above 100°F     SPECIAL INSTRUCTIONS  Our office will contact you in approximately 7 days for a progress report  MEDICATIONS  Continue to take all routine medications  Our office may have instructed you to hold some medications  As no general anesthesia was used in today's procedure, you should not experience any side effects related to anesthesia  If you have a problem specifically related to your procedure, please call our office at [phone]  Problems not related to your procedure should be directed to your primary care physician

## 2022-04-01 NOTE — H&P
History of Present Illness: The patient is a 68 y o  female who presents with complaints of neck pain is here today for cervical epidural steroid injection  Patient Active Problem List   Diagnosis    Benign essential hypertension    Benign neoplasm of bone    Disc degeneration, lumbar    Facial nerve disorder    Generalized osteoarthritis    Mixed hyperlipidemia    Symptomatic menopausal or female climacteric states    Polyp of sigmoid colon    Primary osteoarthritis of right knee    Vitamin D deficiency    Breast cancer screening    Flu vaccine need    Medicare annual wellness visit, subsequent    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    Exposure to COVID-19 virus    Colon cancer screening       Past Medical History:   Diagnosis Date    Arthritis     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, unspecified     Last assessed: 10/17/13    Colon polyps     Depression     Hyperlipidemia     Hypertension     Lumbago     last assessed: 1/15/14    Menopause     Neuropathy     Ptosis, left eyelid     last assessed: 10/16/15    Shortness of breath     Vertigo     last assessed: 10/26/15       Past Surgical History:   Procedure Laterality Date    BLADDER SURGERY      HYSTERECTOMY      [place] COLONOSCOPY FLX DX W/COLLJ SPEC WHEN PFRMD N/A 4/11/2016    Procedure: COLONOSCOPY;  Surgeon: Sally Wolf MD;  Location: [facility]; Service: Gastroenterology         Current Outpatient Medications:     acetaminophen (TYLENOL) 500 mg tablet, Take 500 mg by mouth every 6 (six) hours as needed for mild pain , Disp: , Rfl:     Cholecalciferol (VITAMIN D3) 5000 units CAPS, Take 2,000 Units by mouth every other day  , Disp: , Rfl:     gabapentin (NEURONTIN) 100 mg capsule, Take 1 tablet at bedtime    May increase to 2 tablets after 3 days, Disp: 60 capsule, Rfl: 1    ibuprofen (MOTRIN) 600 mg tablet, Take 1 tablet (600 mg total) by mouth 3 (three) times a day as needed for mild pain, Disp: 90 tablet, Rfl: 2    lisinopril-hydrochlorothiazide (PRINZIDE,ZESTORETIC) 20-25 MG per tablet, Take 1 tablet by mouth daily, Disp: 90 tablet, Rfl: 1    pravastatin (PRAVACHOL) 20 mg tablet, Take 1 tablet (20 mg total) by mouth daily at bedtime, Disp: 90 tablet, Rfl: 1    Current Facility-Administered Medications:     iohexol (OMNIPAQUE) 300 mg/mL injection 1 mL, 1 mL, Epidural, Once, Iveth Frank MD    methylPREDNISolone acetate (DEPO-MEDROL) injection 80 mg, 80 mg, Epidural, Once, Iveth Frank MD    Allergies   Allergen Reactions    Benzoyl Peroxide Rash       Physical Exam:   Vitals:    04/01/22 1048   BP: 151/82   Pulse: 81   Resp: 20   Temp: (!) 96 8 °F (36 °C)   SpO2: 96%     General: Awake, Alert, Oriented x 3, Mood and affect appropriate  Respiratory: Respirations even and unlabored  Cardiovascular: Peripheral pulses intact; no edema  Musculoskeletal Exam:  Neck pain    ASA Score: 3    Patient/Chart Verification  Patient ID Verified: Verbal  ID Band Applied: Yes  Consents Confirmed: Procedural,To be obtained in the Pre-Procedure area  H&P( within 30 days) Verified: Yes  Interval H&P(within 24 hr) Complete (required for Outpatients and Surgery Admit only): Yes  Allergies Reviewed: Yes  Anticoag/NSAID held?: Yes (held Ibuprofen)  Currently on antibiotics?: No    Assessment:   1  Cervical disc disorder with radiculopathy of mid-cervical region    2   Cervical spinal stenosis        Plan: JACQUES

## 2022-04-08 ENCOUNTER — TELEPHONE (OUTPATIENT)
Dept: PAIN MEDICINE | Facility: CLINIC | Age: 77
End: 2022-04-08

## 2022-04-14 ENCOUNTER — TELEPHONE (OUTPATIENT)
Dept: PAIN MEDICINE | Facility: CLINIC | Age: 77
End: 2022-04-14

## 2022-04-14 NOTE — TELEPHONE ENCOUNTER
Fantasma Jalloh from [facility] called asking if the patient has any upcoming appmt- informed caller not at this time- caller ok

## 2022-04-15 NOTE — TELEPHONE ENCOUNTER
Pt reports 80-90% improvement 2wk post inj   She said she still has the buzzing in her fingers up her arm to her elbow   She said her feet still feel numb and swollen   She wants to know if maybe she should get another xray?

## 2022-04-19 ENCOUNTER — RA CDI HCC (OUTPATIENT)
Dept: OTHER | Facility: HOSPITAL | Age: 77
End: 2022-04-19

## 2022-04-19 NOTE — PROGRESS NOTES
[address]  coding opportunities       Chart reviewed, no opportunity found: CHART REVIEWED, NO OPPORTUNITY FOUND        Patients Insurance     Medicare Insurance: Medicare

## 2022-05-05 ENCOUNTER — APPOINTMENT (OUTPATIENT)
Dept: LAB | Facility: CLINIC | Age: 77
End: 2022-05-05
Payer: MEDICARE

## 2022-05-05 DIAGNOSIS — I10 BENIGN ESSENTIAL HYPERTENSION: ICD-10-CM

## 2022-05-05 DIAGNOSIS — I10 ESSENTIAL HYPERTENSION: ICD-10-CM

## 2022-05-05 DIAGNOSIS — E78.2 MIXED HYPERLIPIDEMIA: ICD-10-CM

## 2022-05-05 DIAGNOSIS — E55.9 VITAMIN D DEFICIENCY: ICD-10-CM

## 2022-05-05 LAB
ALBUMIN SERPL BCP-MCNC: 4.2 G/DL (ref 3.5–5)
ALP SERPL-CCNC: 45 U/L (ref 34–104)
ALT SERPL W P-5'-P-CCNC: 11 U/L (ref 7–52)
ANION GAP SERPL CALCULATED.3IONS-SCNC: 4 MMOL/L (ref 4–13)
AST SERPL W P-5'-P-CCNC: 17 U/L (ref 13–39)
BASOPHILS # BLD AUTO: 0.03 THOUSANDS/ΜL (ref 0–0.1)
BASOPHILS NFR BLD AUTO: 1 % (ref 0–1)
BILIRUB SERPL-MCNC: 0.52 MG/DL (ref 0.2–1)
BUN SERPL-MCNC: 20 MG/DL (ref 5–25)
CALCIUM SERPL-MCNC: 9.4 MG/DL (ref 8.4–10.2)
CHLORIDE SERPL-SCNC: 99 MMOL/L (ref 96–108)
CHOLEST SERPL-MCNC: 208 MG/DL
CO2 SERPL-SCNC: 33 MMOL/L (ref 21–32)
CREAT SERPL-MCNC: 0.82 MG/DL (ref 0.6–1.3)
EOSINOPHIL # BLD AUTO: 0.11 THOUSAND/ΜL (ref 0–0.61)
EOSINOPHIL NFR BLD AUTO: 2 % (ref 0–6)
ERYTHROCYTE [DISTWIDTH] IN BLOOD BY AUTOMATED COUNT: 12.8 % (ref 11.6–15.1)
GFR SERPL CREATININE-BSD FRML MDRD: 69 ML/MIN/1.73SQ M
GLUCOSE P FAST SERPL-MCNC: 94 MG/DL (ref 65–99)
HCT VFR BLD AUTO: 38.3 % (ref 34.8–46.1)
HDLC SERPL-MCNC: 73 MG/DL
HGB BLD-MCNC: 12.4 G/DL (ref 11.5–15.4)
IMM GRANULOCYTES # BLD AUTO: 0.03 THOUSAND/UL (ref 0–0.2)
IMM GRANULOCYTES NFR BLD AUTO: 1 % (ref 0–2)
LDLC SERPL CALC-MCNC: 115 MG/DL (ref 0–100)
LYMPHOCYTES # BLD AUTO: 2.22 THOUSANDS/ΜL (ref 0.6–4.47)
LYMPHOCYTES NFR BLD AUTO: 37 % (ref 14–44)
MCH RBC QN AUTO: 32 PG (ref 26.8–34.3)
MCHC RBC AUTO-ENTMCNC: 32.4 G/DL (ref 31.4–37.4)
MCV RBC AUTO: 99 FL (ref 82–98)
MONOCYTES # BLD AUTO: 0.52 THOUSAND/ΜL (ref 0.17–1.22)
MONOCYTES NFR BLD AUTO: 9 % (ref 4–12)
NEUTROPHILS # BLD AUTO: 3.14 THOUSANDS/ΜL (ref 1.85–7.62)
NEUTS SEG NFR BLD AUTO: 50 % (ref 43–75)
NONHDLC SERPL-MCNC: 135 MG/DL
NRBC BLD AUTO-RTO: 0 /100 WBCS
PLATELET # BLD AUTO: 260 THOUSANDS/UL (ref 149–390)
PMV BLD AUTO: 9.6 FL (ref 8.9–12.7)
POTASSIUM SERPL-SCNC: 4.1 MMOL/L (ref 3.5–5.3)
PROT SERPL-MCNC: 7.1 G/DL (ref 6.4–8.4)
RBC # BLD AUTO: 3.87 MILLION/UL (ref 3.81–5.12)
SODIUM SERPL-SCNC: 136 MMOL/L (ref 135–147)
TRIGL SERPL-MCNC: 98 MG/DL
TSH SERPL DL<=0.05 MIU/L-ACNC: 1.23 UIU/ML (ref 0.45–4.5)
WBC # BLD AUTO: 6.05 THOUSAND/UL (ref 4.31–10.16)

## 2022-05-05 PROCEDURE — 36415 COLL VENOUS BLD VENIPUNCTURE: CPT

## 2022-05-05 PROCEDURE — 80061 LIPID PANEL: CPT

## 2022-05-05 PROCEDURE — 84443 ASSAY THYROID STIM HORMONE: CPT

## 2022-05-05 PROCEDURE — 80053 COMPREHEN METABOLIC PANEL: CPT

## 2022-05-05 PROCEDURE — 85025 COMPLETE CBC W/AUTO DIFF WBC: CPT

## 2022-05-05 PROCEDURE — 82652 VIT D 1 25-DIHYDROXY: CPT

## 2022-05-07 LAB — 1,25(OH)2D3 SERPL-MCNC: 38.6 PG/ML (ref 19.9–79.3)

## 2022-05-09 ENCOUNTER — OFFICE VISIT (OUTPATIENT)
Dept: FAMILY MEDICINE CLINIC | Facility: CLINIC | Age: 77
End: 2022-05-09
Payer: MEDICARE

## 2022-05-09 VITALS
DIASTOLIC BLOOD PRESSURE: 64 MMHG | HEART RATE: 74 BPM | OXYGEN SATURATION: 97 % | SYSTOLIC BLOOD PRESSURE: 132 MMHG | HEIGHT: 66 IN | RESPIRATION RATE: 16 BRPM | BODY MASS INDEX: 24.11 KG/M2 | WEIGHT: 150 LBS

## 2022-05-09 DIAGNOSIS — Z78.0 POSTMENOPAUSAL: ICD-10-CM

## 2022-05-09 DIAGNOSIS — Z12.39 ENCOUNTER FOR SCREENING FOR MALIGNANT NEOPLASM OF BREAST, UNSPECIFIED SCREENING MODALITY: ICD-10-CM

## 2022-05-09 DIAGNOSIS — E55.9 VITAMIN D DEFICIENCY: ICD-10-CM

## 2022-05-09 DIAGNOSIS — E78.2 MIXED HYPERLIPIDEMIA: ICD-10-CM

## 2022-05-09 DIAGNOSIS — Z12.31 ENCOUNTER FOR SCREENING MAMMOGRAM FOR BREAST CANCER: Primary | ICD-10-CM

## 2022-05-09 DIAGNOSIS — H25.011 CORTICAL AGE-RELATED CATARACT OF RIGHT EYE: ICD-10-CM

## 2022-05-09 DIAGNOSIS — I10 BENIGN ESSENTIAL HYPERTENSION: ICD-10-CM

## 2022-05-09 DIAGNOSIS — Z00.00 MEDICARE ANNUAL WELLNESS VISIT, SUBSEQUENT: ICD-10-CM

## 2022-05-09 DIAGNOSIS — I10 ESSENTIAL HYPERTENSION: ICD-10-CM

## 2022-05-09 PROCEDURE — G0439 PPPS, SUBSEQ VISIT: HCPCS | Performed by: FAMILY MEDICINE

## 2022-05-09 PROCEDURE — 1123F ACP DISCUSS/DSCN MKR DOCD: CPT | Performed by: FAMILY MEDICINE

## 2022-05-09 PROCEDURE — 99214 OFFICE O/P EST MOD 30 MIN: CPT | Performed by: FAMILY MEDICINE

## 2022-05-09 RX ORDER — PRAVASTATIN SODIUM 20 MG
20 TABLET ORAL
Qty: 90 TABLET | Refills: 1 | Status: SHIPPED | OUTPATIENT
Start: 2022-05-09

## 2022-05-09 RX ORDER — LISINOPRIL AND HYDROCHLOROTHIAZIDE 25; 20 MG/1; MG/1
1 TABLET ORAL DAILY
Qty: 90 TABLET | Refills: 1 | Status: SHIPPED | OUTPATIENT
Start: 2022-05-09

## 2022-05-09 NOTE — PATIENT INSTRUCTIONS
Medicare Preventive Visit Patient Instructions  Thank you for completing your Welcome to Medicare Visit or Medicare Annual Wellness Visit today  Your next wellness visit will be due in one year (5/10/2023)  The screening/preventive services that you may require over the next 5-10 years are detailed below  Some tests may not apply to you based off risk factors and/or age  Screening tests ordered at today's visit but not completed yet may show as past due  Also, please note that scanned in results may not display below  Preventive Screenings:  Service Recommendations Previous Testing/Comments   Colorectal Cancer Screening  * Colonoscopy    * Fecal Occult Blood Test (FOBT)/Fecal Immunochemical Test (FIT)  * Fecal DNA/Cologuard Test  * Flexible Sigmoidoscopy Age: 54-65 years old   Colonoscopy: every 10 years (may be performed more frequently if at higher risk)  OR  FOBT/FIT: every 1 year  OR  Cologuard: every 3 years  OR  Sigmoidoscopy: every 5 years  Screening may be recommended earlier than age 48 if at higher risk for colorectal cancer  Also, an individualized decision between you and your healthcare provider will decide whether screening between the ages of 74-80 would be appropriate  Colonoscopy: 04/11/2016  FOBT/FIT: Not on file  Cologuard: Not on file  Sigmoidoscopy: Not on file    Screening Current     Breast Cancer Screening Age: 36 years old  Frequency: every 1-2 years  Not required if history of left and right mastectomy Mammogram: Not on file    Risks and Benefits Discussed  Due for Mammogram   Cervical Cancer Screening Between the ages of 21-29, pap smear recommended once every 3 years  Between the ages of 33-67, can perform pap smear with HPV co-testing every 5 years     Recommendations may differ for women with a history of total hysterectomy, cervical cancer, or abnormal pap smears in past  Pap Smear: Not on file    Screening Not Indicated   Hepatitis C Screening Once for adults born between 1945 and 1965  More frequently in patients at high risk for Hepatitis C Hep C Antibody: Not on file    Screening Current   Diabetes Screening 1-2 times per year if you're at risk for diabetes or have pre-diabetes Fasting glucose: 94 mg/dL   A1C: No results in last 5 years    Screening Current   Cholesterol Screening Once every 5 years if you don't have a lipid disorder  May order more often based on risk factors  Lipid panel: 05/05/2022    Screening Not Indicated  History Lipid Disorder     Other Preventive Screenings Covered by Medicare:  1  Abdominal Aortic Aneurysm (AAA) Screening: covered once if your at risk  You're considered to be at risk if you have a family history of AAA  2  Lung Cancer Screening: covers low dose CT scan once per year if you meet all of the following conditions: (1) Age 50-69; (2) No signs or symptoms of lung cancer; (3) Current smoker or have quit smoking within the last 15 years; (4) You have a tobacco smoking history of at least 30 pack years (packs per day multiplied by number of years you smoked); (5) You get a written order from a healthcare provider  3  Glaucoma Screening: covered annually if you're considered high risk: (1) You have diabetes OR (2) Family history of glaucoma OR (3)  aged 48 and older OR (3)  American aged 72 and older  3  Osteoporosis Screening: covered every 2 years if you meet one of the following conditions: (1) You're estrogen deficient and at risk for osteoporosis based off medical history and other findings; (2) Have a vertebral abnormality; (3) On glucocorticoid therapy for more than 3 months; (4) Have primary hyperparathyroidism; (5) On osteoporosis medications and need to assess response to drug therapy  · Last bone density test (DXA Scan): Not on file  5  HIV Screening: covered annually if you're between the age of 12-76  Also covered annually if you are younger than 13 and older than 72 with risk factors for HIV infection   For pregnant patients, it is covered up to 3 times per pregnancy  Immunizations:  Immunization Recommendations   Influenza Vaccine Annual influenza vaccination during flu season is recommended for all persons aged >= 6 months who do not have contraindications   Pneumococcal Vaccine (Prevnar and Pneumovax)  * Prevnar = PCV13  * Pneumovax = PPSV23   Adults 25-60 years old: 1-3 doses may be recommended based on certain risk factors  Adults 72 years old: Prevnar (PCV13) vaccine recommended followed by Pneumovax (PPSV23) vaccine  If already received PPSV23 since turning 65, then PCV13 recommended at least one year after PPSV23 dose  Hepatitis B Vaccine 3 dose series if at intermediate or high risk (ex: diabetes, end stage renal disease, liver disease)   Tetanus (Td) Vaccine - COST NOT COVERED BY MEDICARE PART B Following completion of primary series, a booster dose should be given every 10 years to maintain immunity against tetanus  Td may also be given as tetanus wound prophylaxis  Tdap Vaccine - COST NOT COVERED BY MEDICARE PART B Recommended at least once for all adults  For pregnant patients, recommended with each pregnancy  Shingles Vaccine (Shingrix) - COST NOT COVERED BY MEDICARE PART B  2 shot series recommended in those aged 48 and above     Health Maintenance Due:      Topic Date Due    Hepatitis C Screening  Never done    Breast Cancer Screening: Mammogram  Never done    Colorectal Cancer Screening  04/11/2019     Immunizations Due:      Topic Date Due    COVID-19 Vaccine (3 - Booster) 07/11/2021     Advance Directives   What are advance directives? Advance directives are legal documents that state your wishes and plans for medical care  These plans are made ahead of time in case you lose your ability to make decisions for yourself  Advance directives can apply to any medical decision, such as the treatments you want, and if you want to donate organs  What are the types of advance directives? There are many types of advance directives, and each state has rules about how to use them  You may choose a combination of any of the following:  · Living will: This is a written record of the treatment you want  You can also choose which treatments you do not want, which to limit, and which to stop at a certain time  This includes surgery, medicine, IV fluid, and tube feedings  · Durable power of  for healthcare [facility]): This is a written record that states who you want to make healthcare choices for you when you are unable to make them for yourself  This person, called a proxy, is usually a family member or a friend  You may choose more than 1 proxy  · Do not resuscitate (DNR) order:  A DNR order is used in case your heart stops beating or you stop breathing  It is a request not to have certain forms of treatment, such as CPR  A DNR order may be included in other types of advance directives  · Medical directive: This covers the care that you want if you are in a coma, near death, or unable to make decisions for yourself  You can list the treatments you want for each condition  Treatment may include pain medicine, surgery, blood transfusions, dialysis, IV or tube feedings, and a ventilator (breathing machine)  · Values history: This document has questions about your views, beliefs, and how you feel and think about life  This information can help others choose the care that you would choose  Why are advance directives important? An advance directive helps you control your care  Although spoken wishes may be used, it is better to have your wishes written down  Spoken wishes can be misunderstood, or not followed  Treatments may be given even if you do not want them  An advance directive may make it easier for your family to make difficult choices about your care        © Copyright [employer] 2018 Information is for End User's use only and may not be sold, redistributed or otherwise used for commercial purposes   All illustrations and images included in CareNotes® are the copyrighted property of A D A M , Inc  or [facility] Khushbu Landers

## 2022-05-09 NOTE — PROGRESS NOTES
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 and they both fell down a flight of stairs  She took the brunt of the fall  She is seeing pain management following work related injury  She did have labs completed  Normal CBC, CMP, cholesterol is slightly more elevated  She has been eating more ice cream since she has been out of work  Past Medical History:   Diagnosis Date    Arthritis     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, unspecified     Last assessed: 10/17/13    Colon polyps     Depression     Hyperlipidemia     Hypertension     Lumbago     last assessed: 1/15/14    Menopause     Neuropathy     Ptosis, left eyelid     last assessed: 10/16/15    Shortness of breath     Vertigo     last assessed: 10/26/15       Family History   Problem Relation Age of Onset    Heart disease Other         cardiac disorder       Past Surgical History:   Procedure Laterality Date    BLADDER SURGERY      HYSTERECTOMY      SC COLONOSCOPY FLX DX W/COLLJ SPEC WHEN PFRMD N/A 4/11/2016    Procedure: COLONOSCOPY;  Surgeon: Asad Lambert MD;  [facility]; Service: Gastroenterology        reports that she has never smoked  She has never used smokeless tobacco  She reports current alcohol use  She reports that she does not use drugs  Current Outpatient Medications:     acetaminophen (TYLENOL) 500 mg tablet, Take 500 mg by mouth every 6 (six) hours as needed for mild pain , Disp: , Rfl:     Cholecalciferol (VITAMIN D3) 5000 units CAPS, Take 2,000 Units by mouth every other day  , Disp: , Rfl:     gabapentin (NEURONTIN) 100 mg capsule, Take 1 tablet at bedtime    May increase to 2 tablets after 3 days, Disp: 60 capsule, Rfl: 1    ibuprofen (MOTRIN) 600 mg tablet, Take 1 tablet (600 mg total) by mouth 3 (three) times a day as needed for mild pain, Disp: 90 tablet, Rfl: 2    lisinopril-hydrochlorothiazide (PRINZIDE,ZESTORETIC) 20-25 MG per tablet, Take 1 tablet by mouth daily, Disp: 90 tablet, Rfl: 1    pravastatin (PRAVACHOL) 20 mg tablet, Take 1 tablet (20 mg total) by mouth daily at bedtime, Disp: 90 tablet, Rfl: 1    The following portions of the patient's history were reviewed and updated as appropriate: allergies, current medications, past family history, past medical history, past social history, past surgical history and problem list     Review of Systems   Constitutional: Negative  HENT: Negative  Eyes: Negative  Visual disturbance: Blind left eye  Respiratory: Negative  Cardiovascular: Negative  Gastrointestinal: Negative  Endocrine: Negative  Genitourinary: Negative  Musculoskeletal: Negative  Arthralgias: Right knee pain  Skin: Negative  Allergic/Immunologic: Negative  Neurological: Negative  Hematological: Negative  Psychiatric/Behavioral: Negative  Objective:    /64   Pulse 74   Resp 16   Ht 5' 6" (1 676 m)   Wt 68 kg (150 lb)   SpO2 97%   BMI 24 21 kg/m²      Physical Exam  Vitals and nursing note reviewed  Constitutional:       General: She is not in acute distress  Appearance: Normal appearance  She is well-developed and normal weight  She is not diaphoretic  HENT:      Head: Normocephalic and atraumatic  Right Ear: Tympanic membrane, ear canal and external ear normal       Left Ear: Tympanic membrane, ear canal and external ear normal    Eyes:      Conjunctiva/sclera: Conjunctivae normal       Pupils: Pupils are equal, round, and reactive to light  Comments: Ptosis of left upper eyelid, legally blind left eye   Cardiovascular:      Rate and Rhythm: Normal rate and regular rhythm  Pulses: Normal pulses  Heart sounds: Normal heart sounds  No murmur heard        Pulmonary: Effort: Pulmonary effort is normal       Breath sounds: Normal breath sounds  Abdominal:      General: Abdomen is flat  Bowel sounds are normal       Palpations: Abdomen is soft  Musculoskeletal:         General: Normal range of motion  Cervical back: Normal range of motion and neck supple  Skin:     General: Skin is warm and dry  Findings: No rash  Neurological:      General: No focal deficit present  Mental Status: She is alert and oriented to person, place, and time  Mental status is at baseline  Deep Tendon Reflexes: Reflexes are normal and symmetric  Psychiatric:         Mood and Affect: Mood normal          Behavior: Behavior normal          Thought Content:  Thought content normal          Judgment: Judgment normal            Recent Results (from the past 1008 hour(s))   CBC and differential    Collection Time: 05/05/22 10:58 AM   Result Value Ref Range    WBC 6 05 4 31 - 10 16 Thousand/uL    RBC 3 87 3 81 - 5 12 Million/uL    Hemoglobin 12 4 11 5 - 15 4 g/dL    Hematocrit 38 3 34 8 - 46 1 %    MCV 99 (H) 82 - 98 fL    MCH 32 0 26 8 - 34 3 pg    MCHC 32 4 31 4 - 37 4 g/dL    RDW 12 8 11 6 - 15 1 %    MPV 9 6 8 9 - 12 7 fL    Platelets 580 149 - 308 Thousands/uL    nRBC 0 /100 WBCs    Neutrophils Relative 50 43 - 75 %    Immat GRANS % 1 0 - 2 %    Lymphocytes Relative 37 14 - 44 %    Monocytes Relative 9 4 - 12 %    Eosinophils Relative 2 0 - 6 %    Basophils Relative 1 0 - 1 %    Neutrophils Absolute 3 14 1 85 - 7 62 Thousands/µL    Immature Grans Absolute 0 03 0 00 - 0 20 Thousand/uL    Lymphocytes Absolute 2 22 0 60 - 4 47 Thousands/µL    Monocytes Absolute 0 52 0 17 - 1 22 Thousand/µL    Eosinophils Absolute 0 11 0 00 - 0 61 Thousand/µL    Basophils Absolute 0 03 0 00 - 0 10 Thousands/µL   Comprehensive metabolic panel    Collection Time: 05/05/22 10:58 AM   Result Value Ref Range    Sodium 136 135 - 147 mmol/L    Potassium 4 1 3 5 - 5 3 mmol/L    Chloride 99 96 - 108 mmol/L CO2 33 (H) 21 - 32 mmol/L    ANION GAP 4 4 - 13 mmol/L    BUN 20 5 - 25 mg/dL    Creatinine 0 82 0 60 - 1 30 mg/dL    Glucose, Fasting 94 65 - 99 mg/dL    Calcium 9 4 8 4 - 10 2 mg/dL    AST 17 13 - 39 U/L    ALT 11 7 - 52 U/L    Alkaline Phosphatase 45 34 - 104 U/L    Total Protein 7 1 6 4 - 8 4 g/dL    Albumin 4 2 3 5 - 5 0 g/dL    Total Bilirubin 0 52 0 20 - 1 00 mg/dL    eGFR 69 ml/min/1 73sq m   Vitamin D 1,25 dihydroxy    Collection Time: 05/05/22 10:58 AM   Result Value Ref Range    Vit D, 1,25-Dihydroxy 38 6 19 9 - 79 3 pg/mL   TSH, 3rd generation with Free T4 reflex    Collection Time: 05/05/22 10:58 AM   Result Value Ref Range    TSH 3RD GENERATON 1 230 0 450 - 4 500 uIU/mL   Lipid panel    Collection Time: 05/05/22 10:58 AM   Result Value Ref Range    Cholesterol 208 (H) See Comment mg/dL    Triglycerides 98 See Comment mg/dL    HDL, Direct 73 >=50 mg/dL    LDL Calculated 115 (H) 0 - 100 mg/dL    Non-HDL-Chol (CHOL-HDL) 135 mg/dl       Assessment/Plan:    Benign essential hypertension  Adequately controlled on lisinopril/hydrochlorothiazide  Continue same  Re-evaluate in 6 months    Vitamin D deficiency  Patient remains on vitamin-D supplementation  Therapeutic  Repeat vitamin-D level in 6 months    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  Refer to ophthalmology  She needs to follow through with ophthalmology evaluation  She is only able to see out of this  Needs to preserve her vision    Medicare annual wellness visit, subsequent  Completed annual wellness visit    Mixed hyperlipidemia  Remains on pravastatin 20 mg once daily  Continue same  Refill provided  Watch intake of fats and cholesterol  Re-evaluate in 6 months          Problem List Items Addressed This Visit        Cardiovascular and Mediastinum    Benign essential hypertension     Adequately controlled on lisinopril/hydrochlorothiazide  Continue same    Re-evaluate in 6 months         Relevant Medications lisinopril-hydrochlorothiazide (PRINZIDE,ZESTORETIC) 20-25 MG per tablet       Other    Breast cancer screening    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     Refer to ophthalmology  She needs to follow through with ophthalmology evaluation  She is only able to see out of this  Needs to preserve her vision         Relevant Orders    Ambulatory Referral to Ophthalmology    Medicare annual wellness visit, subsequent     Completed annual wellness visit         Mixed hyperlipidemia     Remains on pravastatin 20 mg once daily  Continue same  Refill provided  Watch intake of fats and cholesterol  Re-evaluate in 6 months         Relevant Medications    pravastatin (PRAVACHOL) 20 mg tablet    Other Relevant Orders    Comprehensive metabolic panel    Lipid panel    Vitamin D deficiency     Patient remains on vitamin-D supplementation  Therapeutic    Repeat vitamin-D level in 6 months         Relevant Orders    Vitamin D 1,25 dihydroxy      Other Visit Diagnoses     Encounter for screening mammogram for breast cancer    -  Primary    Relevant Orders    Mammo screening bilateral w 3d & cad    Postmenopausal        Relevant Orders    DXA bone density spine hip and pelvis    Essential hypertension        Relevant Medications    lisinopril-hydrochlorothiazide (PRINZIDE,ZESTORETIC) 20-25 MG per tablet    Other Relevant Orders    CBC and differential    TSH, 3rd generation with Free T4 reflex

## 2022-05-09 NOTE — ASSESSMENT & PLAN NOTE
Refer to ophthalmology  She needs to follow through with ophthalmology evaluation  She is only able to see out of this    Needs to preserve her vision

## 2022-05-09 NOTE — PROGRESS NOTES
Assessment and Plan:     Problem List Items Addressed This Visit        Cardiovascular and Mediastinum    Benign essential hypertension     Adequately controlled on lisinopril/hydrochlorothiazide  Continue same  Re-evaluate in 6 months         Relevant Medications    lisinopril-hydrochlorothiazide (PRINZIDE,ZESTORETIC) 20-25 MG per tablet       Other    Breast cancer screening    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     Refer to ophthalmology  She needs to follow through with ophthalmology evaluation  She is only able to see out of this  Needs to preserve her vision         Relevant Orders    Ambulatory Referral to Ophthalmology    Medicare annual wellness visit, subsequent     Completed annual wellness visit         Mixed hyperlipidemia     Remains on pravastatin 20 mg once daily  Continue same  Refill provided  Watch intake of fats and cholesterol  Re-evaluate in 6 months         Relevant Medications    pravastatin (PRAVACHOL) 20 mg tablet    Other Relevant Orders    Comprehensive metabolic panel    Lipid panel    Vitamin D deficiency     Patient remains on vitamin-D supplementation  Therapeutic  Repeat vitamin-D level in 6 months         Relevant Orders    Vitamin D 1,25 dihydroxy      Other Visit Diagnoses     Encounter for screening mammogram for breast cancer    -  Primary    Relevant Orders    Mammo screening bilateral w 3d & cad    Postmenopausal        Relevant Orders    DXA bone density spine hip and pelvis    Essential hypertension        Relevant Medications    lisinopril-hydrochlorothiazide (PRINZIDE,ZESTORETIC) 20-25 MG per tablet    Other Relevant Orders    CBC and differential    TSH, 3rd generation with Free T4 reflex        BMI Counseling: There is no height or weight on file to calculate BMI  The BMI is above normal  Nutrition recommendations include reducing intake of saturated and trans fat and reducing intake of cholesterol       Depression Screening and Follow-up Plan: Patient was screened for depression during today's encounter  They screened negative with a PHQ-2 score of 0  Preventive health issues were discussed with patient, and age appropriate screening tests were ordered as noted in patient's After Visit Summary  Personalized health advice and appropriate referrals for health education or preventive services given if needed, as noted in patient's After Visit Summary       History of Present Illness:     Patient presents for Medicare Annual Wellness visit    Patient Care Team:  Gini Patrick DO as PCP - General  MD Koby Driver MD Monetta Shoemaker, MD as Endoscopist  Gerardo Angeles Od (Optometry)  Hemal Reid DDS     Problem List:     Patient Active Problem List   Diagnosis    Benign essential hypertension    Benign neoplasm of bone    Disc degeneration, lumbar    Facial nerve disorder    Generalized osteoarthritis    Mixed hyperlipidemia    Symptomatic menopausal or female climacteric states    Polyp of sigmoid colon    Primary osteoarthritis of right knee    Vitamin D deficiency    Breast cancer screening    Flu vaccine need    Medicare annual wellness visit, subsequent    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    Exposure to COVID-19 virus    Colon cancer screening    Cervical disc disorder with radiculopathy of mid-cervical region    Cervical spinal stenosis      Past Medical and Surgical History:     Past Medical History:   Diagnosis Date    Arthritis     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, unspecified     Last assessed: 10/17/13    Colon polyps     Depression     Hyperlipidemia     Hypertension     Lumbago     last assessed: 1/15/14    Menopause     Neuropathy     Ptosis, left eyelid     last assessed: 10/16/15    Shortness of breath     Vertigo     last assessed: 10/26/15     Past Surgical History:   Procedure Laterality Date    BLADDER SURGERY      HYSTERECTOMY      [place] COLONOSCOPY FLX DX W/COLLJ SPEC WHEN PFRMD N/A 4/11/2016    Procedure: COLONOSCOPY;  Surgeon: Juan Hutchinson MD;  Location: [facility]; Service: Gastroenterology      Family History:     Family History   Problem Relation Age of Onset    Heart disease Other         cardiac disorder      Social History:     Social History     Socioeconomic History    Marital status:      Spouse name: None    Number of children: None    Years of education: None    Highest education level: None   Occupational History    None   Tobacco Use    Smoking status: Never Smoker    Smokeless tobacco: Never Used   Vaping Use    Vaping Use: Never used   Substance and Sexual Activity    Alcohol use: Yes     Comment: social, rarely    Drug use: No    Sexual activity: None   Other Topics Concern    None   Social History Narrative    Caffeine use    , per [employer]     Social Determinants of Health     Financial Resource Strain: Not on file   Food Insecurity: Not on file   Transportation Needs: Not on file   Physical Activity: Not on file   Stress: Not on file   Social Connections: Not on file   Intimate Partner Violence: Not on file   Housing Stability: Not on file       Medications and Allergies:     Current Outpatient Medications   Medication Sig Dispense Refill    acetaminophen (TYLENOL) 500 mg tablet Take 500 mg by mouth every 6 (six) hours as needed for mild pain   Cholecalciferol (VITAMIN D3) 5000 units CAPS Take 2,000 Units by mouth every other day        gabapentin (NEURONTIN) 100 mg capsule Take 1 tablet at bedtime    May increase to 2 tablets after 3 days 60 capsule 1    ibuprofen (MOTRIN) 600 mg tablet Take 1 tablet (600 mg total) by mouth 3 (three) times a day as needed for mild pain 90 tablet 2    lisinopril-hydrochlorothiazide (PRINZIDE,ZESTORETIC) 20-25 MG per tablet Take 1 tablet by mouth daily 90 tablet 1    pravastatin (PRAVACHOL) 20 mg tablet Take 1 tablet (20 mg total) by mouth daily at bedtime 90 tablet 1     No current facility-administered medications for this visit  Allergies   Allergen Reactions    Benzoyl Peroxide Rash      Immunizations:     Immunization History   Administered Date(s) Administered    COVID-19 MODERNA VACC 0 5 ML IM 01/13/2021, 02/11/2021    COVID-19, unspecified 01/13/2021    Influenza Split High Dose Preservative Free IM 09/24/2013, 10/08/2014, 10/26/2015, 12/01/2016, 12/12/2017    Influenza, high dose seasonal 0 7 mL 12/13/2018, 12/16/2019, 10/19/2020, 11/01/2021    Pneumococcal Conjugate 13-Valent 05/02/2016    Pneumococcal Conjugate PCV 7 05/09/2011    Pneumococcal Polysaccharide PPV23 05/09/2011      Health Maintenance:         Topic Date Due    Breast Cancer Screening: Mammogram  Never done    Colorectal Cancer Screening  04/11/2019    Hepatitis C Screening  06/09/2084 (Originally 1945)         Topic Date Due    COVID-19 Vaccine (3 - Booster) 07/11/2021      Medicare Health Risk Assessment:     /64   Pulse 74   Resp 16   Ht 5' 6" (1 676 m)   Wt 68 kg (150 lb)   SpO2 97%   BMI 24 21 kg/m²      Mercedez Mooney is here for her Subsequent Wellness visit  Last Medicare Wellness visit information reviewed, patient interviewed, no change since last AWV  Health Risk Assessment:   Patient rates overall health as good  Patient feels that their physical health rating is slightly better  Patient is satisfied with their life  Eyesight was rated as slightly worse  Hearing was rated as same  Patient feels that their emotional and mental health rating is slightly better  Patients states they are never, rarely angry  Patient states they are sometimes unusually tired/fatigued  Pain experienced in the last 7 days has been some  Patient's pain rating has been 3/10  Patient states that she has experienced no weight loss or gain in last 6 months  Depression Screening:   PHQ-2 Score: 0      Fall Risk Screening:    In the past year, patient has experienced: no history of falling in past year Urinary Incontinence Screening:   Patient has not leaked urine accidently in the last six months  Home Safety:  Patient does not have trouble with stairs inside or outside of their home  Patient has working smoke alarms and has working carbon monoxide detector  Home safety hazards include: none  Nutrition:   Current diet is Regular  Medications:   Patient is currently taking over-the-counter supplements  OTC medications include: see medication list  Patient is able to manage medications  Activities of Daily Living (ADLs)/Instrumental Activities of Daily Living (IADLs):   Walk and transfer into and out of bed and chair?: Yes  Dress and groom yourself?: Yes    Bathe or shower yourself?: Yes    Feed yourself? Yes  Do your laundry/housekeeping?: Yes  Manage your money, pay your bills and track your expenses?: Yes  Make your own meals?: Yes    Do your own shopping?: Yes    Previous Hospitalizations:   Any hospitalizations or ED visits within the last 12 months?: No      Advance Care Planning:   Living will: Yes    Durable POA for healthcare:  Yes    Advanced directive: Yes    Advanced directive counseling given: No    Five wishes given: No    Patient declined ACP directive: No    End of Life Decisions reviewed with patient: Yes    Provider agrees with end of life decisions: Yes      PREVENTIVE SCREENINGS      Cardiovascular Screening:    General: Screening Not Indicated and History Lipid Disorder      Diabetes Screening:     General: Screening Current      Colorectal Cancer Screening:     General: Screening Current      Breast Cancer Screening:     General: Risks and Benefits Discussed    Due for: Mammogram        Cervical Cancer Screening:    General: Screening Not Indicated      Osteoporosis Screening:    General: Screening Current      Abdominal Aortic Aneurysm (AAA) Screening:        General: Screening Not Indicated      Lung Cancer Screening:     General: Screening Not Indicated      Hepatitis C Screening:    General: Screening Current    Screening, Brief Intervention, and Referral to Treatment (SBIRT)    Screening  Typical number of drinks in a day: 0  Typical number of drinks in a week: 0  Interpretation: Low risk drinking behavior  AUDIT-C Screenin) How often did you have a drink containing alcohol in the past year? monthly or less  2) How many drinks did you have on a typical day when you were drinking in the past year?  1 to 2  3) How often did you have 6 or more drinks on one occasion in the past year? never    AUDIT-C Score: 1  Interpretation: Score 0-2 (female): Negative screen for alcohol misuse    Single Item Drug Screening:  How often have you used an illegal drug (including marijuana) or a prescription medication for non-medical reasons in the past year? never    Single Item Drug Screen Score: 0  Interpretation: Negative screen for possible drug use disorder      Destiny Duncan, DO

## 2022-05-09 NOTE — ASSESSMENT & PLAN NOTE
Remains on pravastatin 20 mg once daily  Continue same  Refill provided  Watch intake of fats and cholesterol    Re-evaluate in 6 months

## 2022-05-12 ENCOUNTER — TELEPHONE (OUTPATIENT)
Dept: PAIN MEDICINE | Facility: MEDICAL CENTER | Age: 77
End: 2022-05-12

## 2022-05-18 ENCOUNTER — TELEPHONE (OUTPATIENT)
Dept: OBGYN CLINIC | Facility: HOSPITAL | Age: 77
End: 2022-05-18

## 2022-05-18 NOTE — TELEPHONE ENCOUNTER
Patient calling in to schedule ankle x-ray by Marcelo Griffin  Patient provided with CS phone number and transferred

## 2022-05-23 ENCOUNTER — OFFICE VISIT (OUTPATIENT)
Dept: PAIN MEDICINE | Facility: CLINIC | Age: 77
End: 2022-05-23
Payer: OTHER MISCELLANEOUS

## 2022-05-23 VITALS
BODY MASS INDEX: 24.43 KG/M2 | RESPIRATION RATE: 16 BRPM | HEIGHT: 66 IN | SYSTOLIC BLOOD PRESSURE: 128 MMHG | HEART RATE: 67 BPM | DIASTOLIC BLOOD PRESSURE: 72 MMHG | WEIGHT: 152 LBS

## 2022-05-23 DIAGNOSIS — M79.18 MYOFASCIAL PAIN SYNDROME: ICD-10-CM

## 2022-05-23 DIAGNOSIS — M50.120 CERVICAL DISC DISORDER WITH RADICULOPATHY OF MID-CERVICAL REGION: ICD-10-CM

## 2022-05-23 DIAGNOSIS — M48.02 CERVICAL SPINAL STENOSIS: ICD-10-CM

## 2022-05-23 DIAGNOSIS — M25.571 ACUTE RIGHT ANKLE PAIN: ICD-10-CM

## 2022-05-23 DIAGNOSIS — M25.512 ACUTE PAIN OF LEFT SHOULDER: Primary | ICD-10-CM

## 2022-05-23 PROCEDURE — 99214 OFFICE O/P EST MOD 30 MIN: CPT

## 2022-05-23 RX ORDER — METHOCARBAMOL 500 MG/1
500 TABLET, FILM COATED ORAL 2 TIMES DAILY PRN
Qty: 60 TABLET | Refills: 2 | Status: SHIPPED | OUTPATIENT
Start: 2022-05-23

## 2022-05-23 NOTE — PROGRESS NOTES
Assessment:  1  Acute pain of left shoulder    2  Myofascial pain syndrome    3  Cervical disc disorder with radiculopathy of mid-cervical region    4  Cervical spinal stenosis    5  Acute right ankle pain        Plan:  The patient is a 61-year-old female with a history of chronic pain secondary to neck pain, cervical disc disorder with radiculopathy, cervical spinal stenosis, left shoulder pain and right ankle pain that is ongoing since she was injured at work when a client fell into her causing her to hit her left shoulder blade on a door, then the client fell on to her right ankle  At this time I will place an order to x-ray her left shoulder to evaluate for any pathology that may be causing her ongoing left shoulder pain  I instructed patient I will call her once I receive the results  The pain she is experiencing in her left shoulder is likely myofascial in nature  Since the patient is having myofascial pain and/or spasms, I advised the patient that starting on Methocarbamol, a muscle relaxer could help decrease some of the myofascial pain and/or spasms  I advised the patient that he should not drive or operate heavy machinery while on this medication as it could cause excessive lethargy  Advised the patient to call our office if they experience any side effects  The patient verbalized understanding  Instructed patient to follow-up with orthopedics in regards to right ankle pain  My impressions and treatment recommendations were discussed in detail with the patient who verbalized understanding and had no further questions  Discharge instructions were provided  I personally saw and examined the patient and I agree with the above discussed plan of care  Orders Placed This Encounter   Procedures    X-ray shoulder left 2+ views     Standing Status:   Future     Standing Expiration Date:   5/23/2026     Scheduling Instructions:      Bring along any outside films relating to this procedure  New Medications Ordered This Visit   Medications    methocarbamol (ROBAXIN) 500 mg tablet     Sig: Take 1 tablet (500 mg total) by mouth as needed in the morning and 1 tablet (500 mg total) as needed in the evening for muscle spasms  Dispense:  60 tablet     Refill:  2       History of Present Illness:  Anum Ruiz is a 68 y o  female with a history of chronic pain secondary to neck pain, cervical disc disorder with radiculopathy, cervical spinal stenosis, left shoulder pain and right ankle pain  She was last seen on 04/01/2022 where she underwent a cervical epidural steroid injection and states that procedure provided her mild relief of her neck pain that radiates into the left upper extremity, however presents to the office with ongoing left shoulder pain and right ankle pain  She states she was injured at work when a client fell on top of her causing her to hit her left shoulder blade into a door and then the client fell on to her right ankle  She states the pain is the same since the last office visit and constant  She rates the quality of her pain as dull/aching, sharp, numbness and pressure-like and is currently rating it an 8-9/10 on a numeric scale  She is currently taking Tylenol and ibuprofen as needed along with gabapentin 100 mg before bed, she was taking gabapentin 100 mg 2 tablets before bed but it made her too groggy so she decreased it to 100 mg before bed  She has followed up with Orthopedics for x-rays, however her right ankle or left shoulder has not been x-rayed  I have personally reviewed and/or updated the patient's past medical history, past surgical history, family history, social history, current medications, allergies, and vital signs today  Review of Systems   Respiratory: Negative for shortness of breath  Cardiovascular: Negative for chest pain  Gastrointestinal: Negative for constipation, diarrhea, nausea and vomiting     Musculoskeletal: Positive for neck pain  Negative for arthralgias, gait problem, joint swelling and myalgias  Left Shoulder Pain   Skin: Negative for rash  Neurological: Negative for dizziness, seizures and weakness  All other systems reviewed and are negative  Patient Active Problem List   Diagnosis    Benign essential hypertension    Benign neoplasm of bone    Disc degeneration, lumbar    Facial nerve disorder    Generalized osteoarthritis    Mixed hyperlipidemia    Symptomatic menopausal or female climacteric states    Polyp of sigmoid colon    Primary osteoarthritis of right knee    Vitamin D deficiency    Breast cancer screening    Flu vaccine need    Medicare annual wellness visit, subsequent    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    Exposure to COVID-19 virus    Colon cancer screening    Cervical disc disorder with radiculopathy of mid-cervical region    Cervical spinal stenosis       Past Medical History:   Diagnosis Date    Arthritis     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, unspecified     Last assessed: 10/17/13    Colon polyps     Depression     Hyperlipidemia     Hypertension     Lumbago     last assessed: 1/15/14    Menopause     Neuropathy     Ptosis, left eyelid     last assessed: 10/16/15    Shortness of breath     Vertigo     last assessed: 10/26/15       Past Surgical History:   Procedure Laterality Date    BLADDER SURGERY      HYSTERECTOMY      [place] COLONOSCOPY FLX DX W/COLLJ SPEC WHEN PFRMD N/A 4/11/2016    Procedure: COLONOSCOPY;  Surgeon: Sky Rodarte MD;  Location: [facility];   Service: Gastroenterology       Family History   Problem Relation Age of Onset    Heart disease Other         cardiac disorder       Social History     Occupational History    Not on file   Tobacco Use    Smoking status: Never Smoker    Smokeless tobacco: Never Used   Vaping Use    Vaping Use: Never used   Substance and Sexual Activity    Alcohol use: Yes     Comment: social, rarely    Drug use: No    Sexual activity: Not on file       Current Outpatient Medications on File Prior to Visit   Medication Sig    acetaminophen (TYLENOL) 500 mg tablet Take 500 mg by mouth every 6 (six) hours as needed for mild pain   Cholecalciferol (VITAMIN D3) 5000 units CAPS Take 2,000 Units by mouth every other day      gabapentin (NEURONTIN) 100 mg capsule Take 1 tablet at bedtime  May increase to 2 tablets after 3 days    ibuprofen (MOTRIN) 600 mg tablet Take 1 tablet (600 mg total) by mouth 3 (three) times a day as needed for mild pain    lisinopril-hydrochlorothiazide (PRINZIDE,ZESTORETIC) 20-25 MG per tablet Take 1 tablet by mouth daily    pravastatin (PRAVACHOL) 20 mg tablet Take 1 tablet (20 mg total) by mouth daily at bedtime     No current facility-administered medications on file prior to visit  Allergies   Allergen Reactions    Benzoyl Peroxide Rash       Physical Exam:    /72   Pulse 67   Resp 16   Ht 5' 6" (1 676 m)   Wt 68 9 kg (152 lb)   BMI 24 53 kg/m²     Constitutional:normal, well developed, well nourished, alert, in no distress and non-toxic and no overt pain behavior  Eyes:anicteric  HEENT:grossly intact  Neck:supple, symmetric, trachea midline and no masses   Pulmonary:even and unlabored  Cardiovascular:No edema or pitting edema present  Skin:Normal without rashes or lesions and well hydrated  Psychiatric:Mood and affect appropriate  Neurologic:Cranial Nerves II-XII grossly intact  Musculoskeletal:normal and Pain with palpation over left shoulder blade  Right ankle appears swollen      Imaging

## 2022-05-26 ENCOUNTER — TELEPHONE (OUTPATIENT)
Dept: PAIN MEDICINE | Facility: MEDICAL CENTER | Age: 77
End: 2022-05-26

## 2022-05-26 NOTE — TELEPHONE ENCOUNTER
No work restrictions, however I did order an x-ray of her left shoulder at her last office visit which she still did not get done

## 2022-05-26 NOTE — TELEPHONE ENCOUNTER
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

## 2022-05-26 NOTE — TELEPHONE ENCOUNTER
S/w pt, advised of the same  Pt verbalized understanding, states she is having xray done later this week

## 2022-06-01 ENCOUNTER — TELEPHONE (OUTPATIENT)
Dept: PAIN MEDICINE | Facility: CLINIC | Age: 77
End: 2022-06-01

## 2022-06-01 NOTE — TELEPHONE ENCOUNTER
Spoke with patient, patient was under the impression that she needed an appt with Noreen Gonzalez to get xrays done   Informed that patient that she can go to any radiology site to have the xrays done and to follow up with her spine and pain specialist  Patient understood, cancelling orthopedic appt 6/1/22

## 2022-06-01 NOTE — TELEPHONE ENCOUNTER
Patient   [phone]  DR PANIAGUA    Patient is calling in stating that she can't find the order for her EMG script  She also doesn't know the address  Pt said that she also was out in the yard doing yard work  Now she is dizzy not sure if it is her vertigo or something else  What should she do? Do you think she can do the test without having a heart attack?       Pt has vertigo medication can she take that before the test      Appt: 6/1/22 @ 2 in [facility]

## 2022-06-01 NOTE — TELEPHONE ENCOUNTER
S/w pt, she said she was doing yard work today and she had to stop because she got very dizzy, pt said since calling in it has resolved  RN informed pt that it was most likely due to the heat and she should rest and increase fluids  Pt said she is trying to find out where her EMG is scheduled for, RN reviewed [facility] [address] [facility] for today at 2 pm at [facility]

## 2022-06-03 ENCOUNTER — HOSPITAL ENCOUNTER (OUTPATIENT)
Dept: RADIOLOGY | Facility: HOSPITAL | Age: 77
Discharge: HOME | End: 2022-06-03
Payer: OTHER MISCELLANEOUS

## 2022-06-03 DIAGNOSIS — M25.512 ACUTE PAIN OF LEFT SHOULDER: ICD-10-CM

## 2022-06-03 DIAGNOSIS — M25.571 PAIN, JOINT, ANKLE AND FOOT, RIGHT: ICD-10-CM

## 2022-06-03 PROCEDURE — 73030 X-RAY EXAM OF SHOULDER: CPT

## 2022-06-03 PROCEDURE — 73610 X-RAY EXAM OF ANKLE: CPT

## 2022-06-07 ENCOUNTER — TELEPHONE (OUTPATIENT)
Dept: PAIN MEDICINE | Facility: MEDICAL CENTER | Age: 77
End: 2022-06-07

## 2022-06-07 NOTE — LETTER
June 9, 2022     Patient: Felice Wagoner  YOB: 1945  Date of Visit: 6/7/2022      To Whom it May Concern:    Daina Morejon is under my professional care  She suffers from chronic neck and back pain related to spinal stenosis and was injured at work on 01/21/2022  I advised her that she should not be working in any position that is physical and requires her to do any lifting of patients  If you have any questions or concerns, please don't hesitate to call           Sincerely,          Chilango Angelo MD        CC: No Recipients

## 2022-06-07 NOTE — TELEPHONE ENCOUNTER
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

## 2022-06-07 NOTE — TELEPHONE ENCOUNTER
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

## 2022-06-08 ENCOUNTER — TELEPHONE (OUTPATIENT)
Dept: FAMILY MEDICINE CLINIC | Facility: CLINIC | Age: 77
End: 2022-06-08

## 2022-06-08 NOTE — TELEPHONE ENCOUNTER
Patient called again and stated that she is trying to get as many doctors to do a letter for her to insure that she isn't forced to do the job

## 2022-06-08 NOTE — TELEPHONE ENCOUNTER
Patient called and stated that because of her fall in January and not being able to do her job, she needs a letter from the doctor stating that she should not be doing the kind of job she is doing  Her company is having a meeting this week to try and force her to do the job, because they say she is the only one that can do it  She states she is not able to    Please advise

## 2022-06-08 NOTE — TELEPHONE ENCOUNTER
Sure, she can be given a letter that states that she is 68years of age and cannot physically performed that demanding work  She is not collecting unemployment? She 76    She simply leaves that job

## 2022-06-08 NOTE — TELEPHONE ENCOUNTER
Patient states she will require a copy of her records that she can  at the office   Please reach out to her at # [phone]

## 2022-06-08 NOTE — TELEPHONE ENCOUNTER
FYI:  Pt informed of the same as stated in previous task from [address] [place] [address] Road  Pt said the company she works for is requiring her to do two more shifts with this pt and one of those shifts is this Friday at 11 pm and then again in 2 wks  The company has a  and so she had to get herself a   Told pt she will need to inform her  what FQ said  Told pt if her  needs anything further they will need to officially contact our office  Provided our fax # to pt  Questioned pt why she works a job such as this that requires physical labor at her age? Pt said her daughter is telling her to quit this job and she would help her out with things  Pt will inform her  that FQ said she doesn't need a letter from him, he doesn't feel pt should be working a job that requires having to physically move pts

## 2022-06-09 NOTE — TELEPHONE ENCOUNTER
Pt wants to know if she can  a return to work with restrictions letter  Pt states she said FQ said will do the letter and she needs it as soon as possibe    Please call pt  Pt # [phone]

## 2022-06-09 NOTE — TELEPHONE ENCOUNTER
S/w pt, advised of the same  Pt verbalized understanding and agreeable  Pt will  in the office when completed

## 2022-06-09 NOTE — TELEPHONE ENCOUNTER
The only letter that I will write is that she should not be working in this position as it is too physically demanding  That she want me to write the letter?

## 2022-06-09 NOTE — TELEPHONE ENCOUNTER
Per previous notations it seems it was decided letter would not be written  Has this been discussed / changed? Please advise, thank you

## 2022-06-10 ENCOUNTER — OFFICE VISIT (OUTPATIENT)
Dept: FAMILY MEDICINE CLINIC | Facility: CLINIC | Age: 77
End: 2022-06-10
Payer: OTHER MISCELLANEOUS

## 2022-06-10 VITALS
HEART RATE: 70 BPM | OXYGEN SATURATION: 98 % | BODY MASS INDEX: 24.11 KG/M2 | RESPIRATION RATE: 16 BRPM | SYSTOLIC BLOOD PRESSURE: 126 MMHG | WEIGHT: 150 LBS | HEIGHT: 66 IN | DIASTOLIC BLOOD PRESSURE: 78 MMHG

## 2022-06-10 DIAGNOSIS — M50.120 CERVICAL DISC DISORDER WITH RADICULOPATHY OF MID-CERVICAL REGION: Primary | ICD-10-CM
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
## 2022-06-10 DIAGNOSIS — G57.91 NEUROPATHY OF RIGHT FOOT: ICD-10-CM

## 2022-06-10 PROCEDURE — 99214 OFFICE O/P EST MOD 30 MIN: CPT | Performed by: FAMILY MEDICINE

## 2022-06-10 NOTE — TELEPHONE ENCOUNTER
Pt called stating that her laywer did not like the wording of the letter  Pt would like to s/w someone regarding this     Pt states  states the latter should read that " pt was injured 1/21/22 and can not work for now  due to injury "     Pt can be reached at [phone]    Fax: [phone]

## 2022-06-10 NOTE — TELEPHONE ENCOUNTER
Pt informed that  received her earlier message that her  doesn't like the wording of the letter he provided  Told pt that per FQ he is not going to make any changes to the wording of the letter he wrote

## 2022-06-10 NOTE — PROGRESS NOTES
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  She was reportedly walking behind him as he was going up a flight of steps and had reached the top of the steps when he lost his balance and fell backwards into her causing her to also fall backwards striking into a closet door  She had complained of pain in her neck and radiating down to her left arm and fingers as well as numbness and tingling in her right leg going down to her toes  She was seen by physician assistant in Orthopedic surgery after the injury  She did have imaging performed  She was also seen by pain management, had epidural corticosteroid injection  She did have imaging performed  She complains of persistent paresthesias in her right foot and going down her left arm  She did have EMG testing performed which showed evidence of carpal tunnel syndrome  Her workman's compensation case is still open  She has not returned back to orthopedic surgery  She just had imaging performed of her right ankle which did not show any acute osseous abnormality    She questions whether not she should have imaging done of her right foot (for an injury that was sustained over 4 months ago?)          Past Medical History:   Diagnosis Date    Arthritis     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, unspecified     Last assessed: 10/17/13    Colon polyps     Depression     Hyperlipidemia     Hypertension     Lumbago     last assessed: 1/15/14    Menopause     Neuropathy     Ptosis, left eyelid     last assessed: 10/16/15    Shortness of breath     Vertigo     last assessed: 10/26/15       Family History   Problem Relation Age of Onset    Heart disease Other         cardiac disorder       Past Surgical History:   Procedure Laterality Date    BLADDER SURGERY      HYSTERECTOMY      [place] COLONOSCOPY FLX DX W/COLLJ SPEC WHEN PFRMD N/A 4/11/2016    Procedure: COLONOSCOPY;  Surgeon: Yina Velazquez MD;  Location: [facility]; Service: Gastroenterology        reports that she has never smoked  She has never used smokeless tobacco  She reports current alcohol use  She reports that she does not use drugs  Current Outpatient Medications:     acetaminophen (TYLENOL) 500 mg tablet, Take 500 mg by mouth every 6 (six) hours as needed for mild pain , Disp: , Rfl:     Cholecalciferol (VITAMIN D3) 5000 units CAPS, Take 2,000 Units by mouth every other day  , Disp: , Rfl:     gabapentin (NEURONTIN) 100 mg capsule, TAKE 1 CAPSULE BY MOUTH AT BEDTIME, Disp: 30 capsule, Rfl: 5    ibuprofen (MOTRIN) 600 mg tablet, Take 1 tablet (600 mg total) by mouth 3 (three) times a day as needed for mild pain, Disp: 90 tablet, Rfl: 2    lisinopril-hydrochlorothiazide (PRINZIDE,ZESTORETIC) 20-25 MG per tablet, Take 1 tablet by mouth daily, Disp: 90 tablet, Rfl: 1    methocarbamol (ROBAXIN) 500 mg tablet, Take 1 tablet (500 mg total) by mouth as needed in the morning and 1 tablet (500 mg total) as needed in the evening for muscle spasms  , Disp: 60 tablet, Rfl: 2    pravastatin (PRAVACHOL) 20 mg tablet, Take 1 tablet (20 mg total) by mouth daily at bedtime, Disp: 90 tablet, Rfl: 1    The following portions of the patient's history were reviewed and updated as appropriate: allergies, current medications, past family history, past medical history, past social history, past surgical history and problem list     Review of Systems   Constitutional: Negative  Cardiovascular: Negative for leg swelling  Musculoskeletal: Positive for arthralgias, back pain and joint swelling  Neurological: Positive for weakness and numbness (right ankle)             Objective:    BP 126/78   Pulse 70   Resp 16   Ht 5' 6" (1 676 m)   Wt 68 kg (150 lb)   SpO2 98%   BMI 24 21 kg/m²      Physical Exam  Constitutional:       General: She is not in acute distress  Appearance: Normal appearance  She is not ill-appearing  HENT:      Head: Normocephalic and atraumatic  Eyes:      Comments: Chronic ptosis of the left eyelid   Cardiovascular:      Rate and Rhythm: Normal rate and regular rhythm  Neurological:      General: No focal deficit present  Mental Status: She is alert and oriented to person, place, and time  Mental status is at baseline        Gait: Gait normal            Recent Results (from the past 1008 hour(s))   CBC and differential    Collection Time: 05/05/22 10:58 AM   Result Value Ref Range    WBC 6 05 4 31 - 10 16 Thousand/uL    RBC 3 87 3 81 - 5 12 Million/uL    Hemoglobin 12 4 11 5 - 15 4 g/dL    Hematocrit 38 3 34 8 - 46 1 %    MCV 99 (H) 82 - 98 fL    MCH 32 0 26 8 - 34 3 pg    MCHC 32 4 31 4 - 37 4 g/dL    RDW 12 8 11 6 - 15 1 %    MPV 9 6 8 9 - 12 7 fL    Platelets 426 791 - 584 Thousands/uL    nRBC 0 /100 WBCs    Neutrophils Relative 50 43 - 75 %    Immat GRANS % 1 0 - 2 %    Lymphocytes Relative 37 14 - 44 %    Monocytes Relative 9 4 - 12 %    Eosinophils Relative 2 0 - 6 %    Basophils Relative 1 0 - 1 %    Neutrophils Absolute 3 14 1 85 - 7 62 Thousands/µL    Immature Grans Absolute 0 03 0 00 - 0 20 Thousand/uL    Lymphocytes Absolute 2 22 0 60 - 4 47 Thousands/µL    Monocytes Absolute 0 52 0 17 - 1 22 Thousand/µL    Eosinophils Absolute 0 11 0 00 - 0 61 Thousand/µL    Basophils Absolute 0 03 0 00 - 0 10 Thousands/µL   Comprehensive metabolic panel    Collection Time: 05/05/22 10:58 AM   Result Value Ref Range    Sodium 136 135 - 147 mmol/L    Potassium 4 1 3 5 - 5 3 mmol/L    Chloride 99 96 - 108 mmol/L    CO2 33 (H) 21 - 32 mmol/L    ANION GAP 4 4 - 13 mmol/L    BUN 20 5 - 25 mg/dL    Creatinine 0 82 0 60 - 1 30 mg/dL    Glucose, Fasting 94 65 - 99 mg/dL Calcium 9 4 8 4 - 10 2 mg/dL    AST 17 13 - 39 U/L    ALT 11 7 - 52 U/L    Alkaline Phosphatase 45 34 - 104 U/L    Total Protein 7 1 6 4 - 8 4 g/dL    Albumin 4 2 3 5 - 5 0 g/dL    Total Bilirubin 0 52 0 20 - 1 00 mg/dL    eGFR 69 ml/min/1 73sq m   Vitamin D 1,25 dihydroxy    Collection Time: 05/05/22 10:58 AM   Result Value Ref Range    Vit D, 1,25-Dihydroxy 38 6 19 9 - 79 3 pg/mL   TSH, 3rd generation with Free T4 reflex    Collection Time: 05/05/22 10:58 AM   Result Value Ref Range    TSH 3RD GENERATON 1 230 0 450 - 4 500 uIU/mL   Lipid panel    Collection Time: 05/05/22 10:58 AM   Result Value Ref Range    Cholesterol 208 (H) See Comment mg/dL    Triglycerides 98 See Comment mg/dL    HDL, Direct 73 >=50 mg/dL    LDL Calculated 115 (H) 0 - 100 mg/dL    Non-HDL-Chol (CHOL-HDL) 135 mg/dl       Assessment/Plan:    Neuropathy of right foot  Patient remains on gabapentin that was prescribed by pain management physician  She reportedly still has persistent neuropathy of her right foot    Cervical disc disorder with radiculopathy of mid-cervical region  Patient has been evaluated and being seen by pain management physician    Work related injury  Patient had sustained a work related injury over 4 months ago  She was initially evaluated by orthopedic surgery and had subsequent evaluations by pain management  I do not see the benefit in involving another specialist for evaluation of her work related injury when she has not had follow-up with orthopedic surgery who initially evaluated her back in February    There is no point to performing imaging of her right foot 4 months post injury    Anything that would have been sustained at that time would have healed by now and she is able to ambulate    -I did send message back to physician assistant from Orthopedic surgery to possibly have the patient come in for re-evaluation as she had done the initial assessment after her injury    -she is to continue follow-up with pain management    -I do not believe at 68years of age 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  Problem List Items Addressed This Visit        Nervous and Auditory    Cervical disc disorder with radiculopathy of mid-cervical region - Primary     Patient has been evaluated and being seen by pain management physician           Neuropathy of right foot     Patient remains on gabapentin that was prescribed by pain management physician  She reportedly still has persistent neuropathy of her right foot              Other    Work related injury     Patient had sustained a work related injury over 4 months ago  She was initially evaluated by orthopedic surgery and had subsequent evaluations by pain management  I do not see the benefit in involving another specialist for evaluation of her work related injury when she has not had follow-up with orthopedic surgery who initially evaluated her back in February    There is no point to performing imaging of her right foot 4 months post injury  Anything that would have been sustained at that time would have healed by now and she is able to ambulate    -I did send message back to physician assistant from Orthopedic surgery to possibly have the patient come in for re-evaluation as she had done the initial assessment after her injury    -she is to continue follow-up with pain management    -I do not believe at 68years of age 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

## 2022-06-10 NOTE — ASSESSMENT & PLAN NOTE
Patient remains on gabapentin that was prescribed by pain management physician    She reportedly still has persistent neuropathy of her right foot

## 2022-06-10 NOTE — ASSESSMENT & PLAN NOTE
Patient had sustained a work related injury over 4 months ago  She was initially evaluated by orthopedic surgery and had subsequent evaluations by pain management  I do not see the benefit in involving another specialist for evaluation of her work related injury when she has not had follow-up with orthopedic surgery who initially evaluated her back in February    There is no point to performing imaging of her right foot 4 months post injury  Anything that would have been sustained at that time would have healed by now and she is able to ambulate    -I did send message back to physician assistant from Orthopedic surgery to possibly have the patient come in for re-evaluation as she had done the initial assessment after her injury    -she is to continue follow-up with pain management    -I do not believe at 68years of age 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

## 2022-06-13 ENCOUNTER — TELEPHONE (OUTPATIENT)
Dept: RADIOLOGY | Facility: CLINIC | Age: 77
End: 2022-06-13

## 2022-06-13 NOTE — TELEPHONE ENCOUNTER
----- Message from [facility] sent at 6/13/2022 12:53 PM EDT -----  Please call patient with xray of left shoulder showing mild arthritis of acromioclavicular joint  No other abnormalities

## 2022-10-12 PROBLEM — Z12.11 COLON CANCER SCREENING: Status: RESOLVED | Noted: 2021-04-26 | Resolved: 2022-10-12

## 2022-11-06 ENCOUNTER — RA CDI HCC (OUTPATIENT)
Dept: OTHER | Facility: HOSPITAL | Age: 77
End: 2022-11-06

## 2022-11-11 ENCOUNTER — APPOINTMENT (OUTPATIENT)
Dept: LAB | Facility: CLINIC | Age: 77
End: 2022-11-11

## 2022-11-11 DIAGNOSIS — E55.9 VITAMIN D DEFICIENCY: ICD-10-CM

## 2022-11-11 DIAGNOSIS — E78.2 MIXED HYPERLIPIDEMIA: ICD-10-CM

## 2022-11-11 DIAGNOSIS — I10 ESSENTIAL HYPERTENSION: ICD-10-CM

## 2022-11-11 LAB
ALBUMIN SERPL BCP-MCNC: 4.3 G/DL (ref 3.5–5)
ALP SERPL-CCNC: 43 U/L (ref 34–104)
ALT SERPL W P-5'-P-CCNC: 11 U/L (ref 7–52)
ANION GAP SERPL CALCULATED.3IONS-SCNC: 4 MMOL/L (ref 4–13)
AST SERPL W P-5'-P-CCNC: 18 U/L (ref 13–39)
BASOPHILS # BLD AUTO: 0.05 THOUSANDS/ÂΜL (ref 0–0.1)
BASOPHILS NFR BLD AUTO: 1 % (ref 0–1)
BILIRUB SERPL-MCNC: 0.53 MG/DL (ref 0.2–1)
BUN SERPL-MCNC: 21 MG/DL (ref 5–25)
CALCIUM SERPL-MCNC: 9.6 MG/DL (ref 8.4–10.2)
CHLORIDE SERPL-SCNC: 102 MMOL/L (ref 96–108)
CHOLEST SERPL-MCNC: 187 MG/DL
CO2 SERPL-SCNC: 32 MMOL/L (ref 21–32)
CREAT SERPL-MCNC: 0.79 MG/DL (ref 0.6–1.3)
EOSINOPHIL # BLD AUTO: 0.12 THOUSAND/ÂΜL (ref 0–0.61)
EOSINOPHIL NFR BLD AUTO: 2 % (ref 0–6)
ERYTHROCYTE [DISTWIDTH] IN BLOOD BY AUTOMATED COUNT: 12.3 % (ref 11.6–15.1)
GFR SERPL CREATININE-BSD FRML MDRD: 72 ML/MIN/1.73SQ M
GLUCOSE P FAST SERPL-MCNC: 94 MG/DL (ref 65–99)
HCT VFR BLD AUTO: 37.4 % (ref 34.8–46.1)
HDLC SERPL-MCNC: 65 MG/DL
HGB BLD-MCNC: 12.2 G/DL (ref 11.5–15.4)
IMM GRANULOCYTES # BLD AUTO: 0.02 THOUSAND/UL (ref 0–0.2)
IMM GRANULOCYTES NFR BLD AUTO: 0 % (ref 0–2)
LDLC SERPL CALC-MCNC: 100 MG/DL (ref 0–100)
LYMPHOCYTES # BLD AUTO: 2.81 THOUSANDS/ÂΜL (ref 0.6–4.47)
LYMPHOCYTES NFR BLD AUTO: 41 % (ref 14–44)
MCH RBC QN AUTO: 32.2 PG (ref 26.8–34.3)
MCHC RBC AUTO-ENTMCNC: 32.6 G/DL (ref 31.4–37.4)
MCV RBC AUTO: 99 FL (ref 82–98)
MONOCYTES # BLD AUTO: 0.56 THOUSAND/ÂΜL (ref 0.17–1.22)
MONOCYTES NFR BLD AUTO: 8 % (ref 4–12)
NEUTROPHILS # BLD AUTO: 3.29 THOUSANDS/ÂΜL (ref 1.85–7.62)
NEUTS SEG NFR BLD AUTO: 48 % (ref 43–75)
NONHDLC SERPL-MCNC: 122 MG/DL
NRBC BLD AUTO-RTO: 0 /100 WBCS
PLATELET # BLD AUTO: 260 THOUSANDS/UL (ref 149–390)
PMV BLD AUTO: 9.2 FL (ref 8.9–12.7)
POTASSIUM SERPL-SCNC: 4.1 MMOL/L (ref 3.5–5.3)
PROT SERPL-MCNC: 7.2 G/DL (ref 6.4–8.4)
RBC # BLD AUTO: 3.79 MILLION/UL (ref 3.81–5.12)
SODIUM SERPL-SCNC: 138 MMOL/L (ref 135–147)
TRIGL SERPL-MCNC: 112 MG/DL
TSH SERPL DL<=0.05 MIU/L-ACNC: 1.58 UIU/ML (ref 0.45–4.5)
WBC # BLD AUTO: 6.85 THOUSAND/UL (ref 4.31–10.16)

## 2022-11-14 ENCOUNTER — OFFICE VISIT (OUTPATIENT)
Dept: FAMILY MEDICINE CLINIC | Facility: CLINIC | Age: 77
End: 2022-11-14

## 2022-11-14 VITALS
SYSTOLIC BLOOD PRESSURE: 124 MMHG | HEART RATE: 79 BPM | WEIGHT: 157.6 LBS | BODY MASS INDEX: 25.33 KG/M2 | DIASTOLIC BLOOD PRESSURE: 70 MMHG | HEIGHT: 66 IN | OXYGEN SATURATION: 97 %

## 2022-11-14 DIAGNOSIS — H25.011 CORTICAL AGE-RELATED CATARACT OF RIGHT EYE: ICD-10-CM

## 2022-11-14 DIAGNOSIS — I10 BENIGN ESSENTIAL HYPERTENSION: Primary | ICD-10-CM

## 2022-11-14 DIAGNOSIS — I10 ESSENTIAL HYPERTENSION: ICD-10-CM

## 2022-11-14 DIAGNOSIS — Z12.11 COLON CANCER SCREENING: ICD-10-CM

## 2022-11-14 DIAGNOSIS — E55.9 VITAMIN D DEFICIENCY: ICD-10-CM

## 2022-11-14 DIAGNOSIS — E78.2 MIXED HYPERLIPIDEMIA: ICD-10-CM

## 2022-11-14 LAB — 1,25(OH)2D3 SERPL-MCNC: 66.9 PG/ML (ref 24.8–81.5)

## 2022-11-14 RX ORDER — PRAVASTATIN SODIUM 20 MG
20 TABLET ORAL
Qty: 90 TABLET | Refills: 1 | Status: SHIPPED | OUTPATIENT
Start: 2022-11-14

## 2022-11-14 RX ORDER — LISINOPRIL AND HYDROCHLOROTHIAZIDE 25; 20 MG/1; MG/1
1 TABLET ORAL DAILY
Qty: 90 TABLET | Refills: 1 | Status: SHIPPED | OUTPATIENT
Start: 2022-11-14

## 2022-11-14 NOTE — ASSESSMENT & PLAN NOTE
Cholesterol remains very well controlled on pravastatin 20 mg once daily  Continue same  Refill provided    Repeat lipid panel in 6 months

## 2022-11-14 NOTE — ASSESSMENT & PLAN NOTE
Discussed options for colon cancer screening  She does have history of colon polyp years ago  Last colonoscopy was performed in 2016 which was reviewed  No polyp was found at that time  Can perform Cologuard testing which will be sent to her house    Finding a  for colonoscopy would be something of an issue

## 2022-11-14 NOTE — PROGRESS NOTES
Subjective:      Patient ID: Supriya Mclain is a 68 y o  female  69-year-old female with past medical history of hyperlipidemia, hypertension presents for follow-up in regards to chronic conditions  Patient is still fighting her workman's comp claim following client falling on top of her with injury to her shoulder, elbow as well as right ankle  Still remains out of work though her company was trying to get her back working once again  No particular complaints  Patient did have labs performed  Cholesterol is very well controlled and much better than it was 6 months ago on pravastatin 20 mg once daily  She does need refills  Never made appointments with Gastroenterology for repeat colonoscopy or appointment with ophthalmology to address her cataract of her right eye      Past Medical History:   Diagnosis Date   • Arthritis    • 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, unspecified     Last assessed: 10/17/13   • Colon polyps    • Depression    • Hyperlipidemia    • Hypertension    • Lumbago     last assessed: 1/15/14   • Menopause    • Neuropathy    • Ptosis, left eyelid     last assessed: 10/16/15   • Shortness of breath    • Vertigo     last assessed: 10/26/15       Family History   Problem Relation Age of Onset   • Heart disease Other         cardiac disorder       Past Surgical History:   Procedure Laterality Date   • BLADDER SURGERY     • HYSTERECTOMY     • [place] COLONOSCOPY FLX DX W/COLLJ SPEC WHEN PFRMD N/A 4/11/2016    Procedure: COLONOSCOPY;  Surgeon: Beatriz Lindo MD;  Location: [facility]; Service: Gastroenterology        reports that she has never smoked  She has never used smokeless tobacco  She reports current alcohol use  She reports that she does not use drugs        Current Outpatient Medications:   •  acetaminophen (TYLENOL) 500 mg tablet, Take 500 mg by mouth every 6 (six) hours as needed for mild pain , Disp: , Rfl:   •  Cholecalciferol (VITAMIN D3) 5000 units CAPS, Take 2,000 Units by mouth every other day  , Disp: , Rfl:   •  ibuprofen (MOTRIN) 600 mg tablet, Take 1 tablet (600 mg total) by mouth 3 (three) times a day as needed for mild pain, Disp: 90 tablet, Rfl: 2  •  lisinopril-hydrochlorothiazide (PRINZIDE,ZESTORETIC) 20-25 MG per tablet, Take 1 tablet by mouth daily, Disp: 90 tablet, Rfl: 1  •  pravastatin (PRAVACHOL) 20 mg tablet, Take 1 tablet (20 mg total) by mouth daily at bedtime, Disp: 90 tablet, Rfl: 1    The following portions of the patient's history were reviewed and updated as appropriate: allergies, current medications, past family history, past medical history, past social history, past surgical history and problem list     Review of Systems   Constitutional: Negative  HENT: Negative  Eyes: Negative  Visual disturbance: Blind left eye  Blind left eye   Respiratory: Negative  Cardiovascular: Negative  Negative for leg swelling  Gastrointestinal: Negative  Endocrine: Negative  Genitourinary: Negative  Musculoskeletal: Positive for arthralgias and back pain  Negative for joint swelling  Skin: Negative  Allergic/Immunologic: Negative  Neurological: Positive for weakness and numbness (right ankle)  Hematological: Negative  Psychiatric/Behavioral: Negative  Objective:    /70   Pulse 79   Ht 5' 6" (1 676 m)   Wt 71 5 kg (157 lb 9 6 oz)   SpO2 97%   BMI 25 44 kg/m²      Physical Exam  Vitals and nursing note reviewed  Constitutional:       General: She is not in acute distress  Appearance: Normal appearance  She is well-developed and normal weight  She is not diaphoretic  HENT:      Head: Normocephalic and atraumatic  Right Ear: Tympanic membrane, ear canal and external ear normal       Left Ear: Tympanic membrane, ear canal and external ear normal    Eyes:      Extraocular Movements: Extraocular movements intact        Conjunctiva/sclera: Conjunctivae normal       Pupils: Pupils are equal, round, and reactive to light  Comments: Ptosis of left upper eyelid, legally blind left eye   Cardiovascular:      Rate and Rhythm: Normal rate and regular rhythm  Pulses: Normal pulses  Heart sounds: Normal heart sounds  No murmur heard  Pulmonary:      Effort: Pulmonary effort is normal       Breath sounds: Normal breath sounds  Abdominal:      General: Abdomen is flat  Bowel sounds are normal       Palpations: Abdomen is soft  Musculoskeletal:         General: Normal range of motion  Cervical back: Normal range of motion and neck supple  Skin:     General: Skin is warm and dry  Findings: No rash  Neurological:      General: No focal deficit present  Mental Status: She is alert and oriented to person, place, and time  Mental status is at baseline  Deep Tendon Reflexes: Reflexes are normal and symmetric  Psychiatric:         Mood and Affect: Mood normal          Behavior: Behavior normal          Thought Content:  Thought content normal          Judgment: Judgment normal            Recent Results (from the past 1008 hour(s))   CBC and differential    Collection Time: 11/11/22 10:46 AM   Result Value Ref Range    WBC 6 85 4 31 - 10 16 Thousand/uL    RBC 3 79 (L) 3 81 - 5 12 Million/uL    Hemoglobin 12 2 11 5 - 15 4 g/dL    Hematocrit 37 4 34 8 - 46 1 %    MCV 99 (H) 82 - 98 fL    MCH 32 2 26 8 - 34 3 pg    MCHC 32 6 31 4 - 37 4 g/dL    RDW 12 3 11 6 - 15 1 %    MPV 9 2 8 9 - 12 7 fL    Platelets 115 878 - 731 Thousands/uL    nRBC 0 /100 WBCs    Neutrophils Relative 48 43 - 75 %    Immat GRANS % 0 0 - 2 %    Lymphocytes Relative 41 14 - 44 %    Monocytes Relative 8 4 - 12 %    Eosinophils Relative 2 0 - 6 %    Basophils Relative 1 0 - 1 %    Neutrophils Absolute 3 29 1 85 - 7 62 Thousands/µL    Immature Grans Absolute 0 02 0 00 - 0 20 Thousand/uL    Lymphocytes Absolute 2 81 0 60 - 4 47 Thousands/µL    Monocytes Absolute 0 56 0 17 - 1 22 Thousand/µL    Eosinophils Absolute 0 12 0 00 - 0 61 Thousand/µL    Basophils Absolute 0 05 0 00 - 0 10 Thousands/µL   Comprehensive metabolic panel    Collection Time: 11/11/22 10:46 AM   Result Value Ref Range    Sodium 138 135 - 147 mmol/L    Potassium 4 1 3 5 - 5 3 mmol/L    Chloride 102 96 - 108 mmol/L    CO2 32 21 - 32 mmol/L    ANION GAP 4 4 - 13 mmol/L    BUN 21 5 - 25 mg/dL    Creatinine 0 79 0 60 - 1 30 mg/dL    Glucose, Fasting 94 65 - 99 mg/dL    Calcium 9 6 8 4 - 10 2 mg/dL    AST 18 13 - 39 U/L    ALT 11 7 - 52 U/L    Alkaline Phosphatase 43 34 - 104 U/L    Total Protein 7 2 6 4 - 8 4 g/dL    Albumin 4 3 3 5 - 5 0 g/dL    Total Bilirubin 0 53 0 20 - 1 00 mg/dL    eGFR 72 ml/min/1 73sq m   Lipid panel    Collection Time: 11/11/22 10:46 AM   Result Value Ref Range    Cholesterol 187 See Comment mg/dL    Triglycerides 112 See Comment mg/dL    HDL, Direct 65 >=50 mg/dL    LDL Calculated 100 0 - 100 mg/dL    Non-HDL-Chol (CHOL-HDL) 122 mg/dl   TSH, 3rd generation with Free T4 reflex    Collection Time: 11/11/22 10:46 AM   Result Value Ref Range    TSH 3RD GENERATON 1 581 0 450 - 4 500 uIU/mL       Assessment/Plan:    Benign essential hypertension  Remains well controlled on lisinopril/hydrochlorothiazide  Continue same  Refill provided    Vitamin D deficiency  Remains on vitamin-D supplementation  Repeat vitamin-D level to be done in 6 months    Mixed hyperlipidemia  Cholesterol remains very well controlled on pravastatin 20 mg once daily  Continue same  Refill provided  Repeat lipid panel in 6 months    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  Reprinted order for ophthalmology consultation  She should follow through with this especially as she is not presently working    Colon cancer screening  Discussed options for colon cancer screening  She does have history of colon polyp years ago  Last colonoscopy was performed in 2016 which was reviewed  No polyp was found at that time    Can perform Cologuard testing which will be sent to her house   Finding a  for colonoscopy would be something of an issue          Problem List Items Addressed This Visit        Cardiovascular and Mediastinum    Benign essential hypertension - Primary     Remains well controlled on lisinopril/hydrochlorothiazide  Continue same  Refill provided         Relevant Medications    lisinopril-hydrochlorothiazide (PRINZIDE,ZESTORETIC) 20-25 MG per tablet    Other Relevant Orders    TSH, 3rd generation with Free T4 reflex       Other    Colon cancer screening     Discussed options for colon cancer screening  She does have history of colon polyp years ago  Last colonoscopy was performed in 2016 which was reviewed  No polyp was found at that time  Can perform Cologuard testing which will be sent to her house  Finding a  for colonoscopy would be something of an issue         Relevant Orders    Cologuard    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     Reprinted order for ophthalmology consultation  She should follow through with this especially as she is not presently working         Mixed hyperlipidemia     Cholesterol remains very well controlled on pravastatin 20 mg once daily  Continue same  Refill provided  Repeat lipid panel in 6 months         Relevant Medications    pravastatin (PRAVACHOL) 20 mg tablet    Other Relevant Orders    Comprehensive metabolic panel    Lipid panel    Vitamin D deficiency     Remains on vitamin-D supplementation    Repeat vitamin-D level to be done in 6 months         Relevant Orders    Vitamin D 1,25 dihydroxy      Other Visit Diagnoses     Essential hypertension        Relevant Medications    lisinopril-hydrochlorothiazide (PRINZIDE,ZESTORETIC) 20-25 MG per tablet    Other Relevant Orders    CBC and differential

## 2022-11-14 NOTE — ASSESSMENT & PLAN NOTE
Reprinted order for ophthalmology consultation    She should follow through with this especially as she is not presently working

## 2023-01-10 DIAGNOSIS — M48.02 CERVICAL SPINAL STENOSIS: ICD-10-CM

## 2023-01-10 DIAGNOSIS — M50.120 CERVICAL DISC DISORDER WITH RADICULOPATHY OF MID-CERVICAL REGION: ICD-10-CM

## 2023-01-11 RX ORDER — IBUPROFEN 600 MG/1
600 TABLET ORAL 3 TIMES DAILY PRN
Qty: 90 TABLET | Refills: 2 | Status: SHIPPED | OUTPATIENT
Start: 2023-01-11

## 2023-01-13 PROBLEM — Z12.11 COLON CANCER SCREENING: Status: RESOLVED | Noted: 2021-04-26 | Resolved: 2023-01-13

## 2023-01-27 LAB — COLOGUARD RESULT REPORTABLE: POSITIVE

## 2023-02-03 ENCOUNTER — TELEPHONE (OUTPATIENT)
Dept: FAMILY MEDICINE CLINIC | Facility: CLINIC | Age: 78
End: 2023-02-03

## 2023-02-03 DIAGNOSIS — R19.5 POSITIVE COLORECTAL CANCER SCREENING USING COLOGUARD TEST: Primary | ICD-10-CM

## 2023-02-03 NOTE — TELEPHONE ENCOUNTER
----- Message from Marium Dean DO sent at 2/3/2023  9:29 AM EST -----  Please call the patient regarding her abnormal result  Cologuard testing was positive  There are many things that can cause false positive Cologuard testing however colon cancer is also on the differential for causing a positive Cologuard  She will need to have colonoscopy performed  Last colonoscopy was done in 2016 by Dr Leona Oneill    I can place an order for him if she is willing to do so

## 2023-03-09 ENCOUNTER — PREP FOR PROCEDURE (OUTPATIENT)
Dept: GASTROENTEROLOGY | Facility: CLINIC | Age: 78
End: 2023-03-09

## 2023-03-09 ENCOUNTER — TELEPHONE (OUTPATIENT)
Dept: GASTROENTEROLOGY | Facility: CLINIC | Age: 78
End: 2023-03-09

## 2023-03-09 DIAGNOSIS — Z12.11 SCREENING FOR COLON CANCER: Primary | ICD-10-CM

## 2023-03-09 NOTE — TELEPHONE ENCOUNTER
Scheduled date of colonoscopy (as of today): 5/10/23  Physician performing colonoscopy: Randy Fox  [facility] of colonoscopy: [facility]

## 2023-04-26 ENCOUNTER — TELEPHONE (OUTPATIENT)
Dept: GASTROENTEROLOGY | Facility: AMBULARY SURGERY CENTER | Age: 78
End: 2023-04-26

## 2023-05-08 ENCOUNTER — TELEPHONE (OUTPATIENT)
Dept: GASTROENTEROLOGY | Facility: CLINIC | Age: 78
End: 2023-05-08

## 2023-05-08 NOTE — TELEPHONE ENCOUNTER
Patient calling to reschedule colonoscopy  Colonoscopy has been rescheduled for 6/21/23 with Mariah Carias at [facility]

## 2023-05-17 ENCOUNTER — RA CDI HCC (OUTPATIENT)
Dept: OTHER | Facility: HOSPITAL | Age: 78
End: 2023-05-17

## 2023-05-22 ENCOUNTER — APPOINTMENT (OUTPATIENT)
Dept: LAB | Facility: CLINIC | Age: 78
End: 2023-05-22

## 2023-05-22 DIAGNOSIS — I10 BENIGN ESSENTIAL HYPERTENSION: ICD-10-CM

## 2023-05-22 DIAGNOSIS — E78.2 MIXED HYPERLIPIDEMIA: ICD-10-CM

## 2023-05-22 DIAGNOSIS — E55.9 VITAMIN D DEFICIENCY: ICD-10-CM

## 2023-05-22 DIAGNOSIS — I10 ESSENTIAL HYPERTENSION: ICD-10-CM

## 2023-05-22 LAB
ALBUMIN SERPL BCP-MCNC: 4.2 G/DL (ref 3.5–5)
ALP SERPL-CCNC: 46 U/L (ref 34–104)
ALT SERPL W P-5'-P-CCNC: 10 U/L (ref 7–52)
ANION GAP SERPL CALCULATED.3IONS-SCNC: 5 MMOL/L (ref 4–13)
AST SERPL W P-5'-P-CCNC: 15 U/L (ref 13–39)
BASOPHILS # BLD AUTO: 0.06 THOUSANDS/ÂΜL (ref 0–0.1)
BASOPHILS NFR BLD AUTO: 1 % (ref 0–1)
BILIRUB SERPL-MCNC: 0.58 MG/DL (ref 0.2–1)
BUN SERPL-MCNC: 20 MG/DL (ref 5–25)
CALCIUM SERPL-MCNC: 9.5 MG/DL (ref 8.4–10.2)
CHLORIDE SERPL-SCNC: 101 MMOL/L (ref 96–108)
CHOLEST SERPL-MCNC: 204 MG/DL
CO2 SERPL-SCNC: 30 MMOL/L (ref 21–32)
CREAT SERPL-MCNC: 0.85 MG/DL (ref 0.6–1.3)
EOSINOPHIL # BLD AUTO: 0.1 THOUSAND/ÂΜL (ref 0–0.61)
EOSINOPHIL NFR BLD AUTO: 1 % (ref 0–6)
ERYTHROCYTE [DISTWIDTH] IN BLOOD BY AUTOMATED COUNT: 12.2 % (ref 11.6–15.1)
GFR SERPL CREATININE-BSD FRML MDRD: 66 ML/MIN/1.73SQ M
GLUCOSE P FAST SERPL-MCNC: 94 MG/DL (ref 65–99)
HCT VFR BLD AUTO: 38.1 % (ref 34.8–46.1)
HDLC SERPL-MCNC: 62 MG/DL
HGB BLD-MCNC: 12.3 G/DL (ref 11.5–15.4)
IMM GRANULOCYTES # BLD AUTO: 0.02 THOUSAND/UL (ref 0–0.2)
IMM GRANULOCYTES NFR BLD AUTO: 0 % (ref 0–2)
LDLC SERPL CALC-MCNC: 114 MG/DL (ref 0–100)
LYMPHOCYTES # BLD AUTO: 2.6 THOUSANDS/ÂΜL (ref 0.6–4.47)
LYMPHOCYTES NFR BLD AUTO: 37 % (ref 14–44)
MCH RBC QN AUTO: 31.7 PG (ref 26.8–34.3)
MCHC RBC AUTO-ENTMCNC: 32.3 G/DL (ref 31.4–37.4)
MCV RBC AUTO: 98 FL (ref 82–98)
MONOCYTES # BLD AUTO: 0.6 THOUSAND/ÂΜL (ref 0.17–1.22)
MONOCYTES NFR BLD AUTO: 9 % (ref 4–12)
NEUTROPHILS # BLD AUTO: 3.69 THOUSANDS/ÂΜL (ref 1.85–7.62)
NEUTS SEG NFR BLD AUTO: 52 % (ref 43–75)
NONHDLC SERPL-MCNC: 142 MG/DL
NRBC BLD AUTO-RTO: 0 /100 WBCS
PLATELET # BLD AUTO: 261 THOUSANDS/UL (ref 149–390)
PMV BLD AUTO: 9.7 FL (ref 8.9–12.7)
POTASSIUM SERPL-SCNC: 4 MMOL/L (ref 3.5–5.3)
PROT SERPL-MCNC: 7 G/DL (ref 6.4–8.4)
RBC # BLD AUTO: 3.88 MILLION/UL (ref 3.81–5.12)
SODIUM SERPL-SCNC: 136 MMOL/L (ref 135–147)
TRIGL SERPL-MCNC: 142 MG/DL
TSH SERPL DL<=0.05 MIU/L-ACNC: 1.22 UIU/ML (ref 0.45–4.5)
WBC # BLD AUTO: 7.07 THOUSAND/UL (ref 4.31–10.16)

## 2023-05-24 ENCOUNTER — OFFICE VISIT (OUTPATIENT)
Dept: FAMILY MEDICINE CLINIC | Facility: CLINIC | Age: 78
End: 2023-05-24

## 2023-05-24 VITALS
HEART RATE: 74 BPM | HEIGHT: 66 IN | WEIGHT: 160 LBS | RESPIRATION RATE: 16 BRPM | DIASTOLIC BLOOD PRESSURE: 78 MMHG | BODY MASS INDEX: 25.71 KG/M2 | SYSTOLIC BLOOD PRESSURE: 128 MMHG | OXYGEN SATURATION: 99 %

## 2023-05-24 DIAGNOSIS — E78.2 MIXED HYPERLIPIDEMIA: ICD-10-CM
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
## 2023-05-24 DIAGNOSIS — E55.9 VITAMIN D DEFICIENCY: ICD-10-CM

## 2023-05-24 DIAGNOSIS — I10 BENIGN ESSENTIAL HYPERTENSION: ICD-10-CM

## 2023-05-24 DIAGNOSIS — Z00.00 MEDICARE ANNUAL WELLNESS VISIT, SUBSEQUENT: Primary | ICD-10-CM

## 2023-05-24 DIAGNOSIS — I10 ESSENTIAL HYPERTENSION: ICD-10-CM

## 2023-05-24 LAB — 1,25(OH)2D3 SERPL-MCNC: 29.4 PG/ML (ref 24.8–81.5)

## 2023-05-24 RX ORDER — LISINOPRIL AND HYDROCHLOROTHIAZIDE 25; 20 MG/1; MG/1
1 TABLET ORAL DAILY
Qty: 90 TABLET | Refills: 1 | Status: SHIPPED | OUTPATIENT
Start: 2023-05-24

## 2023-05-24 RX ORDER — PRAVASTATIN SODIUM 20 MG
20 TABLET ORAL
Qty: 90 TABLET | Refills: 1 | Status: SHIPPED | OUTPATIENT
Start: 2023-05-24

## 2023-05-24 NOTE — ASSESSMENT & PLAN NOTE
Subsequent annual wellness visit completed    Patient is scheduled for colonoscopy on June 21    Reviewed date and time along with instructions for bowel prep

## 2023-05-24 NOTE — ASSESSMENT & PLAN NOTE
Cholesterol remains adequately controlled on pravastatin 20 mg once daily    Repeat lipid panel to be done in 6 months

## 2023-05-24 NOTE — PROGRESS NOTES
Assessment and Plan:     Problem List Items Addressed This Visit        Cardiovascular and Mediastinum    Benign essential hypertension     Well-controlled on lisinopril/hydrochlorothiazide  Continue same  Refill provided         Relevant Medications    lisinopril-hydrochlorothiazide (PRINZIDE,ZESTORETIC) 20-25 MG per tablet    Other Relevant Orders    CBC and differential    TSH, 3rd generation with Free T4 reflex       Other    Medicare annual wellness visit, subsequent - Primary     Subsequent annual wellness visit completed    Patient is scheduled for colonoscopy on June 21  Reviewed date and time along with instructions for bowel prep         Mixed hyperlipidemia     Cholesterol remains adequately controlled on pravastatin 20 mg once daily  Repeat lipid panel to be done in 6 months         Relevant Medications    pravastatin (PRAVACHOL) 20 mg tablet    Other Relevant Orders    Comprehensive metabolic panel    Lipid panel    Vitamin D deficiency     Remains on over-the-counter vitamin D supplementation  Check vitamin D level in 6 months         Relevant Orders    Vitamin D 1,25 dihydroxy    Work related injury     Patient has been receiving chiropractic treatments  She still has ongoing claim with her employer  Reportedly they are going to settlement in 3 weeks        Other Visit Diagnoses     Essential hypertension        Relevant Medications    lisinopril-hydrochlorothiazide (PRINZIDE,ZESTORETIC) 20-25 MG per tablet        BMI Counseling: Body mass index is 25 82 kg/m²  The BMI is above normal  Nutrition recommendations include reducing intake of saturated and trans fat and reducing intake of cholesterol  Preventive health issues were discussed with patient, and age appropriate screening tests were ordered as noted in patient's After Visit Summary    Personalized health advice and appropriate referrals for health education or preventive services given if needed, as noted in patient's After Visit Summary  History of Present Illness:     Patient presents for Medicare Annual Wellness visit    Patient Care Team:  Elian Hale DO as PCP - General  MD Myla Brambila MD as Endoscopist  ADRIENNE Frazier MD Bobie Furnish, [place] (Chiropractic Medicine)     Problem List:     Patient Active Problem List   Diagnosis   • Benign essential hypertension   • Benign neoplasm of bone   • Disc degeneration, lumbar   • Facial nerve disorder   • Generalized osteoarthritis   • Mixed hyperlipidemia   • Symptomatic menopausal or female climacteric states   • Neuropathy of right foot   • Polyp of sigmoid colon   • Primary osteoarthritis of right knee   • Vitamin D deficiency   • Breast cancer screening   • Flu vaccine need   • Medicare annual wellness visit, subsequent   • 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   • Exposure to COVID-19 virus   • Cervical disc disorder with radiculopathy of mid-cervical region   • Cervical spinal stenosis   • Work related injury      Past Medical and Surgical History:     Past Medical History:   Diagnosis Date   • Arthritis    • 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, unspecified     Last assessed: 10/17/13   • Colon polyps    • Depression    • Hyperlipidemia    • Hypertension    • Lumbago     last assessed: 1/15/14   • Menopause    • Neuropathy    • Ptosis, left eyelid     last assessed: 10/16/15   • Shortness of breath    • Vertigo     last assessed: 10/26/15     Past Surgical History:   Procedure Laterality Date   • BLADDER SURGERY     • HYSTERECTOMY     • MI COLONOSCOPY FLX DX W/COLLJ SPEC WHEN PFRMD N/A 4/11/2016    Procedure: COLONOSCOPY;  Surgeon: Myla Salazar MD;  Location: [facility];   Service: Gastroenterology      Family History:     Family History   Problem Relation Age of Onset   • Heart disease Other         cardiac disorder      Social History:     Social History     Socioeconomic History   • Marital status:  Spouse name: None   • Number of children: None   • Years of education: None   • Highest education level: None   Occupational History   • None   Tobacco Use   • Smoking status: Never   • Smokeless tobacco: Never   Vaping Use   • Vaping Use: Never used   Substance and Sexual Activity   • Alcohol use: Yes     Comment: social, rarely   • Drug use: No   • Sexual activity: None   Other Topics Concern   • None   Social History Narrative    Caffeine use    , per [employer]     Social Determinants of Health     Financial Resource Strain: Low Risk  (5/24/2023)    Overall Financial Resource Strain (CARDIA)    • Difficulty of Paying Living Expenses: Not very hard   Food Insecurity: Not on file   Transportation Needs: No Transportation Needs (5/24/2023)    PRAPARE - Transportation    • Lack of Transportation (Medical): No    • Lack of Transportation (Non-Medical): No   Physical Activity: Not on file   Stress: Not on file   Social Connections: Not on file   Intimate Partner Violence: Not on file   Housing Stability: Not on file       Medications and Allergies:     Current Outpatient Medications   Medication Sig Dispense Refill   • acetaminophen (TYLENOL) 500 mg tablet Take 500 mg by mouth every 6 (six) hours as needed for mild pain  • Cholecalciferol (VITAMIN D3) 5000 units CAPS Take 2,000 Units by mouth every other day       • ibuprofen (MOTRIN) 600 mg tablet Take 1 tablet (600 mg total) by mouth 3 (three) times a day as needed for mild pain 90 tablet 2   • lisinopril-hydrochlorothiazide (PRINZIDE,ZESTORETIC) 20-25 MG per tablet Take 1 tablet by mouth daily 90 tablet 1   • pravastatin (PRAVACHOL) 20 mg tablet Take 1 tablet (20 mg total) by mouth daily at bedtime 90 tablet 1     No current facility-administered medications for this visit       Allergies   Allergen Reactions   • Benzoyl Peroxide Rash      Immunizations:     Immunization History   Administered Date(s) Administered   • COVID-19 MODERNA VACC 0 5 ML IM "01/13/2021, 02/11/2021, 11/11/2022   • COVID-19, unspecified 01/13/2021   • INFLUENZA 11/12/2022   • Influenza Split High Dose Preservative Free IM 09/24/2013, 10/08/2014, 10/26/2015, 12/01/2016, 12/12/2017   • Influenza, high dose seasonal 0 7 mL 12/13/2018, 12/16/2019, 10/19/2020, 11/01/2021   • Pneumococcal Conjugate 13-Valent 05/02/2016   • Pneumococcal Conjugate PCV 7 05/09/2011   • Pneumococcal Polysaccharide PPV23 05/09/2011      Health Maintenance:         Topic Date Due   • Breast Cancer Screening: Mammogram  Never done   • Colorectal Cancer Screening  01/20/2023   • Hepatitis C Screening  06/09/2084 (Originally 1945)     There are no preventive care reminders to display for this patient  Medicare Health Risk Assessment:     /78   Pulse 74   Resp 16   Ht 5' 6\" (1 676 m)   Wt 72 6 kg (160 lb)   SpO2 99%   BMI 25 82 kg/m²      Vida Garay is here for her Subsequent Wellness visit  Last Medicare Wellness visit information reviewed, patient interviewed, no change since last AWV  Health Risk Assessment:   Patient rates overall health as good  Patient feels that their physical health rating is slightly better  Patient is satisfied with their life  Eyesight was rated as slightly worse  Hearing was rated as same  Patient feels that their emotional and mental health rating is slightly better  Patients states they are never, rarely angry  Patient states they are sometimes unusually tired/fatigued  Pain experienced in the last 7 days has been some  Patient's pain rating has been 3/10  Patient states that she has experienced no weight loss or gain in last 6 months  Fall Risk Screening: In the past year, patient has experienced: no history of falling in past year      Urinary Incontinence Screening:   Patient has not leaked urine accidently in the last six months  Home Safety:  Patient does not have trouble with stairs inside or outside of their home   Patient has working smoke alarms and has " working carbon monoxide detector  Home safety hazards include: none  Nutrition:   Current diet is Regular  Medications:   Patient is currently taking over-the-counter supplements  OTC medications include: see medication list  Patient is able to manage medications  Activities of Daily Living (ADLs)/Instrumental Activities of Daily Living (IADLs):   Walk and transfer into and out of bed and chair?: Yes  Dress and groom yourself?: Yes    Bathe or shower yourself?: Yes    Feed yourself? Yes  Do your laundry/housekeeping?: Yes  Manage your money, pay your bills and track your expenses?: Yes  Make your own meals?: Yes    Do your own shopping?: Yes    Previous Hospitalizations:   Any hospitalizations or ED visits within the last 12 months?: No      Advance Care Planning:   Living will: Yes    Durable POA for healthcare:  Yes    Advanced directive: Yes    Advanced directive counseling given: No    Five wishes given: No    Patient declined ACP directive: No    End of Life Decisions reviewed with patient: Yes    Provider agrees with end of life decisions: Yes      PREVENTIVE SCREENINGS      Cardiovascular Screening:    General: Screening Not Indicated and History Lipid Disorder      Diabetes Screening:     General: Screening Current      Colorectal Cancer Screening:     General: Screening Current      Breast Cancer Screening:     General: Risks and Benefits Discussed    Due for: Mammogram        Cervical Cancer Screening:    General: Screening Not Indicated      Osteoporosis Screening:    General: Screening Current      Abdominal Aortic Aneurysm (AAA) Screening:        General: Screening Not Indicated      Lung Cancer Screening:     General: Screening Not Indicated      Hepatitis C Screening:    General: Screening Current    Screening, Brief Intervention, and Referral to Treatment (SBIRT)    Screening  Typical number of drinks in a day: 0  Typical number of drinks in a week: 0  Interpretation: Low risk drinking behavior  AUDIT-C Screenin) How often did you have a drink containing alcohol in the past year? monthly or less  2) How many drinks did you have on a typical day when you were drinking in the past year? 1 to 2  3) How often did you have 6 or more drinks on one occasion in the past year? never    AUDIT-C Score: 1  Interpretation: Score 0-2 (female): Negative screen for alcohol misuse    Single Item Drug Screening:  How often have you used an illegal drug (including marijuana) or a prescription medication for non-medical reasons in the past year? never    Single Item Drug Screen Score: 0  Interpretation: Negative screen for possible drug use disorder      Brian Abdalla DO    HPI      Review of Systems   Constitutional: Negative  HENT: Negative  Eyes: Negative  Visual disturbance: Blind left eye  Respiratory: Negative  Cardiovascular: Negative  Gastrointestinal: Negative  Endocrine: Negative  Genitourinary: Negative  Musculoskeletal: Negative  Arthralgias: Right knee pain  Skin: Negative  Allergic/Immunologic: Negative  Neurological: Negative  Hematological: Negative  Psychiatric/Behavioral: Negative  Physical Exam  Vitals and nursing note reviewed  Constitutional:       General: She is not in acute distress  Appearance: Normal appearance  She is well-developed and normal weight  HENT:      Head: Normocephalic and atraumatic  Eyes:      Conjunctiva/sclera: Conjunctivae normal       Pupils: Pupils are equal, round, and reactive to light  Cardiovascular:      Rate and Rhythm: Normal rate and regular rhythm  Heart sounds: No murmur heard  Pulmonary:      Effort: Pulmonary effort is normal  No respiratory distress  Breath sounds: Normal breath sounds  Abdominal:      General: Bowel sounds are normal       Palpations: Abdomen is soft  Tenderness: There is no abdominal tenderness  Musculoskeletal:         General: No swelling        Cervical back: Normal range of motion and neck supple  Skin:     General: Skin is warm and dry  Capillary Refill: Capillary refill takes less than 2 seconds  Neurological:      Mental Status: She is alert and oriented to person, place, and time  Deep Tendon Reflexes: Reflexes are normal and symmetric  Comments:   Very mild ptosis of the left eyelid   Psychiatric:         Mood and Affect: Mood normal          Behavior: Behavior normal          Thought Content:  Thought content normal          Judgment: Judgment normal

## 2023-05-24 NOTE — ASSESSMENT & PLAN NOTE
Patient has been receiving chiropractic treatments  She still has ongoing claim with her employer    Reportedly they are going to settlement in 3 weeks

## 2023-05-24 NOTE — PATIENT INSTRUCTIONS
Medicare Preventive Visit Patient Instructions  Thank you for completing your Welcome to Medicare Visit or Medicare Annual Wellness Visit today  Your next wellness visit will be due in one year (5/24/2024)  The screening/preventive services that you may require over the next 5-10 years are detailed below  Some tests may not apply to you based off risk factors and/or age  Screening tests ordered at today's visit but not completed yet may show as past due  Also, please note that scanned in results may not display below  Preventive Screenings:  Service Recommendations Previous Testing/Comments   Colorectal Cancer Screening  * Colonoscopy    * Fecal Occult Blood Test (FOBT)/Fecal Immunochemical Test (FIT)  * Fecal DNA/Cologuard Test  * Flexible Sigmoidoscopy Age: 39-70 years old   Colonoscopy: every 10 years (may be performed more frequently if at higher risk)  OR  FOBT/FIT: every 1 year  OR  Cologuard: every 3 years  OR  Sigmoidoscopy: every 5 years  Screening may be recommended earlier than age 39 if at higher risk for colorectal cancer  Also, an individualized decision between you and your healthcare provider will decide whether screening between the ages of 74-80 would be appropriate  Colonoscopy: 04/11/2016  FOBT/FIT: Not on file  Cologuard: 01/19/2023  Sigmoidoscopy: Not on file    Screening Current     Breast Cancer Screening Age: 36 years old  Frequency: every 1-2 years  Not required if history of left and right mastectomy Mammogram: Not on file    Risks and Benefits Discussed  Due for Mammogram   Cervical Cancer Screening Between the ages of 21-29, pap smear recommended once every 3 years  Between the ages of 33-67, can perform pap smear with HPV co-testing every 5 years     Recommendations may differ for women with a history of total hysterectomy, cervical cancer, or abnormal pap smears in past  Pap Smear: Not on file    Screening Not Indicated   Hepatitis C Screening Once for adults born between 1945 and 1965  More frequently in patients at high risk for Hepatitis C Hep C Antibody: Not on file    Screening Current   Diabetes Screening 1-2 times per year if you're at risk for diabetes or have pre-diabetes Fasting glucose: 94 mg/dL (5/22/2023)  A1C: No results in last 5 years (No results in last 5 years)  Screening Current   Cholesterol Screening Once every 5 years if you don't have a lipid disorder  May order more often based on risk factors  Lipid panel: 05/22/2023    Screening Not Indicated  History Lipid Disorder     Other Preventive Screenings Covered by Medicare:  1  Abdominal Aortic Aneurysm (AAA) Screening: covered once if your at risk  You're considered to be at risk if you have a family history of AAA  2  Lung Cancer Screening: covers low dose CT scan once per year if you meet all of the following conditions: (1) Age 50-69; (2) No signs or symptoms of lung cancer; (3) Current smoker or have quit smoking within the last 15 years; (4) You have a tobacco smoking history of at least 20 pack years (packs per day multiplied by number of years you smoked); (5) You get a written order from a healthcare provider  3  Glaucoma Screening: covered annually if you're considered high risk: (1) You have diabetes OR (2) Family history of glaucoma OR (3)  aged 48 and older OR (3)  American aged 72 and older  3  Osteoporosis Screening: covered every 2 years if you meet one of the following conditions: (1) You're estrogen deficient and at risk for osteoporosis based off medical history and other findings; (2) Have a vertebral abnormality; (3) On glucocorticoid therapy for more than 3 months; (4) Have primary hyperparathyroidism; (5) On osteoporosis medications and need to assess response to drug therapy  · Last bone density test (DXA Scan): Not on file  5  HIV Screening: covered annually if you're between the age of 12-76   Also covered annually if you are younger than 13 and older than 72 with risk factors for HIV infection  For pregnant patients, it is covered up to 3 times per pregnancy  Immunizations:  Immunization Recommendations   Influenza Vaccine Annual influenza vaccination during flu season is recommended for all persons aged >= 6 months who do not have contraindications   Pneumococcal Vaccine   * Pneumococcal conjugate vaccine = PCV13 (Prevnar 13), PCV15 (Vaxneuvance), PCV20 (Prevnar 20)  * Pneumococcal polysaccharide vaccine = PPSV23 (Pneumovax) Adults 25-60 years old: 1-3 doses may be recommended based on certain risk factors  Adults 72 years old: 1-2 doses may be recommended based off what pneumonia vaccine you previously received   Hepatitis B Vaccine 3 dose series if at intermediate or high risk (ex: diabetes, end stage renal disease, liver disease)   Tetanus (Td) Vaccine - COST NOT COVERED BY MEDICARE PART B Following completion of primary series, a booster dose should be given every 10 years to maintain immunity against tetanus  Td may also be given as tetanus wound prophylaxis  Tdap Vaccine - COST NOT COVERED BY MEDICARE PART B Recommended at least once for all adults  For pregnant patients, recommended with each pregnancy  Shingles Vaccine (Shingrix) - COST NOT COVERED BY MEDICARE PART B  2 shot series recommended in those aged 48 and above     Health Maintenance Due:      Topic Date Due   • Breast Cancer Screening: Mammogram  Never done   • Colorectal Cancer Screening  01/20/2023   • Hepatitis C Screening  06/09/2084 (Originally 1945)     Immunizations Due:      Topic Date Due   • COVID-19 Vaccine (5 - Mixed Product series) 01/06/2023     Advance Directives   What are advance directives? Advance directives are legal documents that state your wishes and plans for medical care  These plans are made ahead of time in case you lose your ability to make decisions for yourself   Advance directives can apply to any medical decision, such as the treatments you want, and if you want to donate organs  What are the types of advance directives? There are many types of advance directives, and each state has rules about how to use them  You may choose a combination of any of the following:  · Living will: This is a written record of the treatment you want  You can also choose which treatments you do not want, which to limit, and which to stop at a certain time  This includes surgery, medicine, IV fluid, and tube feedings  · Durable power of  for healthcare [facility]): This is a written record that states who you want to make healthcare choices for you when you are unable to make them for yourself  This person, called a proxy, is usually a family member or a friend  You may choose more than 1 proxy  · Do not resuscitate (DNR) order:  A DNR order is used in case your heart stops beating or you stop breathing  It is a request not to have certain forms of treatment, such as CPR  A DNR order may be included in other types of advance directives  · Medical directive: This covers the care that you want if you are in a coma, near death, or unable to make decisions for yourself  You can list the treatments you want for each condition  Treatment may include pain medicine, surgery, blood transfusions, dialysis, IV or tube feedings, and a ventilator (breathing machine)  · Values history: This document has questions about your views, beliefs, and how you feel and think about life  This information can help others choose the care that you would choose  Why are advance directives important? An advance directive helps you control your care  Although spoken wishes may be used, it is better to have your wishes written down  Spoken wishes can be misunderstood, or not followed  Treatments may be given even if you do not want them  An advance directive may make it easier for your family to make difficult choices about your care     Weight Management   Why it is important to manage your weight:  Being overweight increases your risk of health conditions such as heart disease, high blood pressure, type 2 diabetes, and certain types of cancer  It can also increase your risk for osteoarthritis, sleep apnea, and other respiratory problems  Aim for a slow, steady weight loss  Even a small amount of weight loss can lower your risk of health problems  How to lose weight safely:  A safe and healthy way to lose weight is to eat fewer calories and get regular exercise  You can lose up about 1 pound a week by decreasing the number of calories you eat by 500 calories each day  Healthy meal plan for weight management:  A healthy meal plan includes a variety of foods, contains fewer calories, and helps you stay healthy  A healthy meal plan includes the following:  · Eat whole-grain foods more often  A healthy meal plan should contain fiber  Fiber is the part of grains, fruits, and vegetables that is not broken down by your body  Whole-grain foods are healthy and provide extra fiber in your diet  Some examples of whole-grain foods are whole-wheat breads and pastas, oatmeal, brown rice, and bulgur  · Eat a variety of vegetables every day  Include dark, leafy greens such as spinach, kale, parveen greens, and mustard greens  Eat yellow and orange vegetables such as carrots, sweet potatoes, and winter squash  · Eat a variety of fruits every day  Choose fresh or canned fruit (canned in its own juice or light syrup) instead of juice  Fruit juice has very little or no fiber  · Eat low-fat dairy foods  Drink fat-free (skim) milk or 1% milk  Eat fat-free yogurt and low-fat cottage cheese  Try low-fat cheeses such as mozzarella and other reduced-fat cheeses  · Choose meat and other protein foods that are low in fat  Choose beans or other legumes such as split peas or lentils  Choose fish, skinless poultry (chicken or turkey), or lean cuts of red meat (beef or pork)  Before you cook meat or poultry, cut off any visible fat  · Use less fat and oil  Try baking foods instead of frying them  Add less fat, such as margarine, sour cream, regular salad dressing and mayonnaise to foods  Eat fewer high-fat foods  Some examples of high-fat foods include french fries, doughnuts, ice cream, and cakes  · Eat fewer sweets  Limit foods and drinks that are high in sugar  This includes candy, cookies, regular soda, and sweetened drinks  Exercise:  Exercise at least 30 minutes per day on most days of the week  Some examples of exercise include walking, biking, dancing, and swimming  You can also fit in more physical activity by taking the stairs instead of the elevator or parking farther away from stores  Ask your healthcare provider about the best exercise plan for you  © Copyright [employer] 2018 Information is for End User's use only and may not be sold, redistributed or otherwise used for commercial purposes   All illustrations and images included in CareNotes® are the copyrighted property of [employer]  or [address] [place] [address]

## 2023-06-21 ENCOUNTER — ANESTHESIA EVENT (OUTPATIENT)
Dept: GASTROENTEROLOGY | Facility: AMBULARY SURGERY CENTER | Age: 78
End: 2023-06-21

## 2023-06-21 ENCOUNTER — ANESTHESIA (OUTPATIENT)
Dept: GASTROENTEROLOGY | Facility: AMBULARY SURGERY CENTER | Age: 78
End: 2023-06-21

## 2023-06-21 ENCOUNTER — HOSPITAL ENCOUNTER (OUTPATIENT)
Dept: GASTROENTEROLOGY | Facility: AMBULARY SURGERY CENTER | Age: 78
Setting detail: OUTPATIENT SURGERY
Discharge: HOME | End: 2023-06-21
Attending: INTERNAL MEDICINE | Admitting: INTERNAL MEDICINE
Payer: MEDICARE

## 2023-06-21 VITALS
HEIGHT: 66 IN | HEART RATE: 65 BPM | RESPIRATION RATE: 20 BRPM | TEMPERATURE: 97.5 F | DIASTOLIC BLOOD PRESSURE: 54 MMHG | SYSTOLIC BLOOD PRESSURE: 89 MMHG | OXYGEN SATURATION: 99 % | WEIGHT: 152 LBS | BODY MASS INDEX: 24.43 KG/M2

## 2023-06-21 DIAGNOSIS — Z12.11 SCREENING FOR COLON CANCER: ICD-10-CM

## 2023-06-21 PROBLEM — Z98.890 PONV (POSTOPERATIVE NAUSEA AND VOMITING): Status: ACTIVE | Noted: 2023-06-21

## 2023-06-21 PROBLEM — R11.2 PONV (POSTOPERATIVE NAUSEA AND VOMITING): Status: ACTIVE | Noted: 2023-06-21

## 2023-06-21 PROCEDURE — G0121 COLON CA SCRN NOT HI RSK IND: HCPCS | Performed by: INTERNAL MEDICINE

## 2023-06-21 RX ORDER — LIDOCAINE HYDROCHLORIDE 10 MG/ML
INJECTION, SOLUTION EPIDURAL; INFILTRATION; INTRACAUDAL; PERINEURAL AS NEEDED
Status: DISCONTINUED | OUTPATIENT
Start: 2023-06-21 | End: 2023-06-21

## 2023-06-21 RX ORDER — PROPOFOL 10 MG/ML
INJECTION, EMULSION INTRAVENOUS AS NEEDED
Status: DISCONTINUED | OUTPATIENT
Start: 2023-06-21 | End: 2023-06-21

## 2023-06-21 RX ORDER — PROPOFOL 10 MG/ML
INJECTION, EMULSION INTRAVENOUS CONTINUOUS PRN
Status: DISCONTINUED | OUTPATIENT
Start: 2023-06-21 | End: 2023-06-21

## 2023-06-21 RX ORDER — ONDANSETRON 2 MG/ML
INJECTION INTRAMUSCULAR; INTRAVENOUS AS NEEDED
Status: DISCONTINUED | OUTPATIENT
Start: 2023-06-21 | End: 2023-06-21

## 2023-06-21 RX ORDER — SODIUM CHLORIDE, SODIUM LACTATE, POTASSIUM CHLORIDE, CALCIUM CHLORIDE 600; 310; 30; 20 MG/100ML; MG/100ML; MG/100ML; MG/100ML
INJECTION, SOLUTION INTRAVENOUS CONTINUOUS PRN
Status: DISCONTINUED | OUTPATIENT
Start: 2023-06-21 | End: 2023-06-21

## 2023-06-21 RX ADMIN — PROPOFOL 30 MG: 10 INJECTION, EMULSION INTRAVENOUS at 08:51

## 2023-06-21 RX ADMIN — PROPOFOL 70 MG: 10 INJECTION, EMULSION INTRAVENOUS at 08:45

## 2023-06-21 RX ADMIN — PROPOFOL 30 MG: 10 INJECTION, EMULSION INTRAVENOUS at 08:56

## 2023-06-21 RX ADMIN — LIDOCAINE HYDROCHLORIDE 50 MG: 10 INJECTION, SOLUTION EPIDURAL; INFILTRATION; INTRACAUDAL; PERINEURAL at 08:45

## 2023-06-21 RX ADMIN — SODIUM CHLORIDE, SODIUM LACTATE, POTASSIUM CHLORIDE, AND CALCIUM CHLORIDE: .6; .31; .03; .02 INJECTION, SOLUTION INTRAVENOUS at 08:36

## 2023-06-21 RX ADMIN — SODIUM CHLORIDE, SODIUM LACTATE, POTASSIUM CHLORIDE, AND CALCIUM CHLORIDE: .6; .31; .03; .02 INJECTION, SOLUTION INTRAVENOUS at 08:59

## 2023-06-21 RX ADMIN — PROPOFOL 100 MCG/KG/MIN: 10 INJECTION, EMULSION INTRAVENOUS at 08:45

## 2023-06-21 RX ADMIN — ONDANSETRON 4 MG: 2 INJECTION INTRAMUSCULAR; INTRAVENOUS at 08:43

## 2023-06-21 NOTE — H&P
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, unspecified     Last assessed: 10/17/13   • Colon polyps    • Depression    • Hyperlipidemia    • Hypertension    • Lumbago     last assessed: 1/15/14   • Menopause    • Neuropathy    • Ptosis, left eyelid     last assessed: 10/16/15   • Shortness of breath    • Vertigo     last assessed: 10/26/15     Past Surgical History:   Procedure Laterality Date   • BLADDER SURGERY     • HYSTERECTOMY     • AL COLONOSCOPY FLX DX W/COLLJ SPEC WHEN PFRMD N/A 4/11/2016    Procedure: COLONOSCOPY;  Surgeon: Madeline Crawford MD;  Location: [facility]; Service: Gastroenterology     Social History   Social History     Substance and Sexual Activity   Alcohol Use Yes    Comment: social, rarely     Social History     Substance and Sexual Activity   Drug Use No     Social History     Tobacco Use   Smoking Status Never   Smokeless Tobacco Never     Family History   Problem Relation Age of Onset   • Heart disease Other         cardiac disorder       Meds/Allergies     (Not in a hospital admission)      Allergies   Allergen Reactions   • Benzoyl Peroxide Rash       Objective     Blood pressure 140/69, pulse 94, temperature (!) 96 9 °F (36 1 °C), temperature source Temporal, resp  rate 16, height 5' 6\" (1 676 m), weight 68 9 kg (152 lb), SpO2 95 %      PHYSICAL EXAM:    Gen: NAD  CV: S1 & S2 normal, RRR  CHEST: Clear to auscultate  ABD: soft, NT/ND, good bowel sounds  EXT: no edema    ASSESSMENT:     History of colon polyps    PLAN:    Colonoscopy              "

## 2023-06-21 NOTE — ANESTHESIA POSTPROCEDURE EVALUATION
Post-Op Assessment Note    CV Status:  Stable  Pain Score: 0    Pain management: adequate     Mental Status:  Alert and awake   Hydration Status:  Euvolemic   PONV Controlled:  Controlled   Airway Patency:  Patent      Post Op Vitals Reviewed: Yes      Staff: CRNA         No notable events documented      BP (!) 88/52 (06/21/23 0907)    Temp 97 5 °F (36 4 °C) (06/21/23 0907)    Pulse 67 (06/21/23 0907)   Resp 20 (06/21/23 0907)    SpO2 95 % (06/21/23 0907)

## 2023-06-21 NOTE — ANESTHESIA PREPROCEDURE EVALUATION
Procedure:  COLONOSCOPY    Relevant Problems   ANESTHESIA   (+) PONV (postoperative nausea and vomiting)      CARDIO   (+) Benign essential hypertension   (+) Mixed hyperlipidemia      MUSCULOSKELETAL   (+) Disc degeneration, lumbar   (+) Generalized osteoarthritis   (+) Primary osteoarthritis of right knee        Physical Exam    Airway    Mallampati score: unable to assess  TM Distance: >3 FB  Neck ROM: full     Dental       Cardiovascular      Pulmonary      Other Findings        Anesthesia Plan  ASA Score- 2     Anesthesia Type- IV sedation with anesthesia with ASA Monitors  Additional Monitors:   Airway Plan:           Plan Factors-Exercise tolerance (METS): >4 METS  Chart reviewed  Existing labs reviewed  Patient summary reviewed  Patient is not a current smoker  Induction- intravenous  Postoperative Plan-     Informed Consent- Anesthetic plan and risks discussed with patient  I personally reviewed this patient with the CRNA  Discussed and agreed on the Anesthesia Plan with the CRNA  Kaci Brown

## 2023-11-14 ENCOUNTER — TELEPHONE (OUTPATIENT)
Age: 78
End: 2023-11-14

## 2023-11-14 NOTE — TELEPHONE ENCOUNTER
Patient is asking if she should get the RSV vaccine. She is also asking if she can get the covid and flu shot together.   Wilfredo Blanton

## 2023-11-14 NOTE — TELEPHONE ENCOUNTER
Patient advised to not get the Covid and Flu together (wait about 2 weeks) and he does not recommend the RSV vaccine at this time

## 2023-11-22 ENCOUNTER — APPOINTMENT (OUTPATIENT)
Dept: LAB | Facility: CLINIC | Age: 78
End: 2023-11-22
Payer: MEDICARE

## 2023-11-22 DIAGNOSIS — E78.2 MIXED HYPERLIPIDEMIA: ICD-10-CM

## 2023-11-22 DIAGNOSIS — I10 BENIGN ESSENTIAL HYPERTENSION: ICD-10-CM

## 2023-11-22 DIAGNOSIS — E55.9 VITAMIN D DEFICIENCY: ICD-10-CM

## 2023-11-22 LAB
ALBUMIN SERPL BCP-MCNC: 4.5 G/DL (ref 3.5–5)
ALP SERPL-CCNC: 46 U/L (ref 34–104)
ALT SERPL W P-5'-P-CCNC: 9 U/L (ref 7–52)
ANION GAP SERPL CALCULATED.3IONS-SCNC: 4 MMOL/L
AST SERPL W P-5'-P-CCNC: 15 U/L (ref 13–39)
BASOPHILS # BLD AUTO: 0.04 THOUSANDS/ÂΜL (ref 0–0.1)
BASOPHILS NFR BLD AUTO: 1 % (ref 0–1)
BILIRUB SERPL-MCNC: 0.61 MG/DL (ref 0.2–1)
BUN SERPL-MCNC: 25 MG/DL (ref 5–25)
CALCIUM SERPL-MCNC: 9.6 MG/DL (ref 8.4–10.2)
CHLORIDE SERPL-SCNC: 99 MMOL/L (ref 96–108)
CHOLEST SERPL-MCNC: 232 MG/DL
CO2 SERPL-SCNC: 31 MMOL/L (ref 21–32)
CREAT SERPL-MCNC: 0.91 MG/DL (ref 0.6–1.3)
EOSINOPHIL # BLD AUTO: 0.09 THOUSAND/ÂΜL (ref 0–0.61)
EOSINOPHIL NFR BLD AUTO: 1 % (ref 0–6)
ERYTHROCYTE [DISTWIDTH] IN BLOOD BY AUTOMATED COUNT: 12.3 % (ref 11.6–15.1)
GFR SERPL CREATININE-BSD FRML MDRD: 60 ML/MIN/1.73SQ M
GLUCOSE P FAST SERPL-MCNC: 93 MG/DL (ref 65–99)
HCT VFR BLD AUTO: 39.7 % (ref 34.8–46.1)
HDLC SERPL-MCNC: 59 MG/DL
HGB BLD-MCNC: 13 G/DL (ref 11.5–15.4)
IMM GRANULOCYTES # BLD AUTO: 0.02 THOUSAND/UL (ref 0–0.2)
IMM GRANULOCYTES NFR BLD AUTO: 0 % (ref 0–2)
LDLC SERPL CALC-MCNC: 147 MG/DL (ref 0–100)
LYMPHOCYTES # BLD AUTO: 2.79 THOUSANDS/ÂΜL (ref 0.6–4.47)
LYMPHOCYTES NFR BLD AUTO: 34 % (ref 14–44)
MCH RBC QN AUTO: 31.8 PG (ref 26.8–34.3)
MCHC RBC AUTO-ENTMCNC: 32.7 G/DL (ref 31.4–37.4)
MCV RBC AUTO: 97 FL (ref 82–98)
MONOCYTES # BLD AUTO: 0.67 THOUSAND/ÂΜL (ref 0.17–1.22)
MONOCYTES NFR BLD AUTO: 8 % (ref 4–12)
NEUTROPHILS # BLD AUTO: 4.69 THOUSANDS/ÂΜL (ref 1.85–7.62)
NEUTS SEG NFR BLD AUTO: 56 % (ref 43–75)
NONHDLC SERPL-MCNC: 173 MG/DL
NRBC BLD AUTO-RTO: 0 /100 WBCS
PLATELET # BLD AUTO: 289 THOUSANDS/UL (ref 149–390)
PMV BLD AUTO: 10 FL (ref 8.9–12.7)
POTASSIUM SERPL-SCNC: 3.9 MMOL/L (ref 3.5–5.3)
PROT SERPL-MCNC: 7.4 G/DL (ref 6.4–8.4)
RBC # BLD AUTO: 4.09 MILLION/UL (ref 3.81–5.12)
SODIUM SERPL-SCNC: 134 MMOL/L (ref 135–147)
TRIGL SERPL-MCNC: 128 MG/DL
TSH SERPL DL<=0.05 MIU/L-ACNC: 1.77 UIU/ML (ref 0.45–4.5)
WBC # BLD AUTO: 8.3 THOUSAND/UL (ref 4.31–10.16)

## 2023-11-22 PROCEDURE — 80053 COMPREHEN METABOLIC PANEL: CPT

## 2023-11-22 PROCEDURE — 82652 VIT D 1 25-DIHYDROXY: CPT

## 2023-11-22 PROCEDURE — 36415 COLL VENOUS BLD VENIPUNCTURE: CPT

## 2023-11-22 PROCEDURE — 84443 ASSAY THYROID STIM HORMONE: CPT

## 2023-11-22 PROCEDURE — 80061 LIPID PANEL: CPT

## 2023-11-22 PROCEDURE — 85025 COMPLETE CBC W/AUTO DIFF WBC: CPT

## 2023-11-24 LAB — 1,25(OH)2D3 SERPL-MCNC: 63.5 PG/ML (ref 24.8–81.5)

## 2023-11-28 ENCOUNTER — RA CDI HCC (OUTPATIENT)
Dept: OTHER | Facility: HOSPITAL | Age: 78
End: 2023-11-28

## 2023-12-05 ENCOUNTER — HOSPITAL ENCOUNTER (EMERGENCY)
Facility: HOSPITAL | Age: 78
Discharge: HOME | End: 2023-12-05
Attending: EMERGENCY MEDICINE
Payer: MEDICARE

## 2023-12-05 ENCOUNTER — APPOINTMENT (EMERGENCY)
Dept: CT IMAGING | Facility: HOSPITAL | Age: 78
End: 2023-12-05
Payer: MEDICARE

## 2023-12-05 ENCOUNTER — APPOINTMENT (EMERGENCY)
Dept: RADIOLOGY | Facility: HOSPITAL | Age: 78
End: 2023-12-05
Payer: MEDICARE

## 2023-12-05 ENCOUNTER — NURSE TRIAGE (OUTPATIENT)
Age: 78
End: 2023-12-05

## 2023-12-05 VITALS
SYSTOLIC BLOOD PRESSURE: 128 MMHG | RESPIRATION RATE: 18 BRPM | OXYGEN SATURATION: 99 % | DIASTOLIC BLOOD PRESSURE: 59 MMHG | HEART RATE: 73 BPM | TEMPERATURE: 98.2 F

## 2023-12-05 DIAGNOSIS — R42 LIGHTHEADEDNESS: Primary | ICD-10-CM

## 2023-12-05 LAB
2HR DELTA HS TROPONIN: -1 NG/L
ALBUMIN SERPL BCP-MCNC: 4.5 G/DL (ref 3.5–5)
ALP SERPL-CCNC: 41 U/L (ref 34–104)
ALT SERPL W P-5'-P-CCNC: 10 U/L (ref 7–52)
ANION GAP SERPL CALCULATED.3IONS-SCNC: 9 MMOL/L
AST SERPL W P-5'-P-CCNC: 15 U/L (ref 13–39)
BASOPHILS # BLD AUTO: 0.05 THOUSANDS/ÂΜL (ref 0–0.1)
BASOPHILS NFR BLD AUTO: 1 % (ref 0–1)
BILIRUB SERPL-MCNC: 0.41 MG/DL (ref 0.2–1)
BUN SERPL-MCNC: 25 MG/DL (ref 5–25)
CALCIUM SERPL-MCNC: 10.5 MG/DL (ref 8.4–10.2)
CARDIAC TROPONIN I PNL SERPL HS: 3 NG/L
CARDIAC TROPONIN I PNL SERPL HS: 4 NG/L
CHLORIDE SERPL-SCNC: 100 MMOL/L (ref 96–108)
CO2 SERPL-SCNC: 28 MMOL/L (ref 21–32)
CREAT SERPL-MCNC: 1.04 MG/DL (ref 0.6–1.3)
EOSINOPHIL # BLD AUTO: 0.05 THOUSAND/ÂΜL (ref 0–0.61)
EOSINOPHIL NFR BLD AUTO: 1 % (ref 0–6)
ERYTHROCYTE [DISTWIDTH] IN BLOOD BY AUTOMATED COUNT: 12.2 % (ref 11.6–15.1)
GFR SERPL CREATININE-BSD FRML MDRD: 51 ML/MIN/1.73SQ M
GLUCOSE SERPL-MCNC: 98 MG/DL (ref 65–140)
HCT VFR BLD AUTO: 37.7 % (ref 34.8–46.1)
HGB BLD-MCNC: 12.6 G/DL (ref 11.5–15.4)
IMM GRANULOCYTES # BLD AUTO: 0.02 THOUSAND/UL (ref 0–0.2)
IMM GRANULOCYTES NFR BLD AUTO: 0 % (ref 0–2)
LYMPHOCYTES # BLD AUTO: 1.94 THOUSANDS/ÂΜL (ref 0.6–4.47)
LYMPHOCYTES NFR BLD AUTO: 19 % (ref 14–44)
MAGNESIUM SERPL-MCNC: 1.9 MG/DL (ref 1.9–2.7)
MCH RBC QN AUTO: 31.8 PG (ref 26.8–34.3)
MCHC RBC AUTO-ENTMCNC: 33.4 G/DL (ref 31.4–37.4)
MCV RBC AUTO: 95 FL (ref 82–98)
MONOCYTES # BLD AUTO: 0.55 THOUSAND/ÂΜL (ref 0.17–1.22)
MONOCYTES NFR BLD AUTO: 5 % (ref 4–12)
NEUTROPHILS # BLD AUTO: 7.78 THOUSANDS/ÂΜL (ref 1.85–7.62)
NEUTS SEG NFR BLD AUTO: 74 % (ref 43–75)
NRBC BLD AUTO-RTO: 0 /100 WBCS
PLATELET # BLD AUTO: 256 THOUSANDS/UL (ref 149–390)
PMV BLD AUTO: 9.2 FL (ref 8.9–12.7)
POTASSIUM SERPL-SCNC: 4 MMOL/L (ref 3.5–5.3)
PROT SERPL-MCNC: 7.4 G/DL (ref 6.4–8.4)
RBC # BLD AUTO: 3.96 MILLION/UL (ref 3.81–5.12)
SODIUM SERPL-SCNC: 137 MMOL/L (ref 135–147)
WBC # BLD AUTO: 10.39 THOUSAND/UL (ref 4.31–10.16)

## 2023-12-05 PROCEDURE — G1004 CDSM NDSC: HCPCS

## 2023-12-05 PROCEDURE — 99285 EMERGENCY DEPT VISIT HI MDM: CPT | Performed by: EMERGENCY MEDICINE

## 2023-12-05 PROCEDURE — 70450 CT HEAD/BRAIN W/O DYE: CPT

## 2023-12-05 PROCEDURE — 99284 EMERGENCY DEPT VISIT MOD MDM: CPT

## 2023-12-05 PROCEDURE — 85025 COMPLETE CBC W/AUTO DIFF WBC: CPT | Performed by: EMERGENCY MEDICINE

## 2023-12-05 PROCEDURE — 96361 HYDRATE IV INFUSION ADD-ON: CPT

## 2023-12-05 PROCEDURE — 93005 ELECTROCARDIOGRAM TRACING: CPT

## 2023-12-05 PROCEDURE — 83735 ASSAY OF MAGNESIUM: CPT | Performed by: EMERGENCY MEDICINE

## 2023-12-05 PROCEDURE — 96360 HYDRATION IV INFUSION INIT: CPT

## 2023-12-05 PROCEDURE — 36415 COLL VENOUS BLD VENIPUNCTURE: CPT | Performed by: EMERGENCY MEDICINE

## 2023-12-05 PROCEDURE — 84484 ASSAY OF TROPONIN QUANT: CPT | Performed by: EMERGENCY MEDICINE

## 2023-12-05 PROCEDURE — 80053 COMPREHEN METABOLIC PANEL: CPT | Performed by: EMERGENCY MEDICINE

## 2023-12-05 PROCEDURE — 71045 X-RAY EXAM CHEST 1 VIEW: CPT

## 2023-12-05 RX ADMIN — SODIUM CHLORIDE 1000 ML: 0.9 INJECTION, SOLUTION INTRAVENOUS at 13:30

## 2023-12-05 NOTE — TELEPHONE ENCOUNTER
Reason for Disposition  • Pain also in shoulder(s) or arm(s) or jaw    Additional Information  • Chest pain    Answer Assessment - Initial Assessment Questions  1. DESCRIPTION: "Describe your dizziness."      Describes feeling funny  2. LIGHTHEADED: "Do you feel lightheaded?" (e.g., somewhat faint, woozy, weak upon standing)      Faint feeling  3. VERTIGO: "Do you feel like either you or the room is spinning or tilting?" (i.e. vertigo)      Head felt fuzzy, couldn't focus  4. SEVERITY: "How bad is it?"  "Do you feel like you are going to faint?" "Can you stand and walk?"    - MILD: Feels slightly dizzy, but walking normally. - MODERATE: Feels very unsteady when walking, but not falling; interferes with normal activities (e.g., school, work) . - SEVERE: Unable to walk without falling, or requires assistance to walk without falling; feels like passing out now.       moderate  5. ONSET:  "When did the dizziness begin?"      Was in the shower when episode happened   6. AGGRAVATING FACTORS: "Does anything make it worse?" (e.g., standing, change in head position)      standing  7. HEART RATE: "Can you tell me your heart rate?" "How many beats in 15 seconds?"  (Note: not all patients can do this)        Did not feel any heart pounding or racing  8. CAUSE: "What do you think is causing the dizziness?"      unknown  9. RECURRENT SYMPTOM: "Have you had dizziness before?" If Yes, ask: "When was the last time?" "What happened that time?"      denies  10.  OTHER SYMPTOMS: "Do you have any other symptoms?" (e.g., fever, chest pain, vomiting, diarrhea, bleeding)        Left shoulder and upper back discomfort, and bottom of the neck during the episode this morning    Protocols used: Dizziness-ADULT-[place], Chest Pain-ADULT-[place]

## 2023-12-05 NOTE — TELEPHONE ENCOUNTER
Regarding: almost passing out in the shower  ----- Message from Magnus Quinten sent at 12/5/2023 10:04 AM EST -----  Patient called she has an appointment with Dr Ollie Paniagua today      ----the patient was taking a shower    ---she started to feel funny and almost blacked out  everything looked brown    ----the patients neck and the top of her shoulders hurt but she did not hit anything      ---she wanted to change her appointment to later today there are no openings   please call her thank you

## 2023-12-05 NOTE — ED PROVIDER NOTES
History  Chief Complaint   Patient presents with    Syncope     Arriving via ems. Pt was taking a shower and felt dizzy and lightheaded. She sat down and it resolved. Pt ambulated out to the stretcher for EMS. 77-year-old female with history of hypertension, hyperlipidemia, arthritis, depression who presents for evaluation of lightheadedness. Patient reports that she was feeling in her usual state of health until about an hour prior to arrival when she states she was in the shower and felt lightheaded. She states that her vision "went brown" but she did not lose consciousness. She states that she tried turning the water temperature down which seemed to help. This only lasted for a few seconds prior to resolving spontaneously. She states that since the event her head "feels fuzzy" which she has had before. She denies vertiginous symptoms. She had no chest pain or shortness of breath during the event but states her chest does feel a little tight now. She denies abdominal pain, nausea, vomiting, headache, vision changes, diarrhea. Prior to Admission Medications   Prescriptions Last Dose Informant Patient Reported? Taking? Cholecalciferol (VITAMIN D3) 5000 units CAPS  Self Yes No   Sig: Take 2,000 Units by mouth every other day     acetaminophen (TYLENOL) 500 mg tablet  Self Yes No   Sig: Take 500 mg by mouth every 6 (six) hours as needed for mild pain.    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, unspecified     Last assessed: 10/17/13 Colon polyps     Depression     Hyperlipidemia     Hypertension     Lumbago     last assessed: 1/15/14    Menopause     Neuropathy     Ptosis, left eyelid     last assessed: 10/16/15    Shortness of breath     Vertigo     last assessed: 10/26/15       Past Surgical History:   Procedure Laterality Date    BLADDER SURGERY      HYSTERECTOMY      LA COLONOSCOPY FLX DX W/COLLJ SPEC WHEN PFRMD N/A 4/11/2016    Procedure: COLONOSCOPY;  Surgeon: Arely Finney MD;  Location: [facility]; Service: Gastroenterology       Family History   Problem Relation Age of Onset    Heart disease Other         cardiac disorder     I have reviewed and agree with the history as documented. E-Cigarette/Vaping    E-Cigarette Use Never User      E-Cigarette/Vaping Substances    Nicotine No     THC No     CBD No     Flavoring No     Other No     Unknown No      Social History     Tobacco Use    Smoking status: Never    Smokeless tobacco: Never   Vaping Use    Vaping Use: Never used   Substance Use Topics    Alcohol use: Yes     Comment: social, rarely    Drug use: No       Review of Systems   Constitutional:  Negative for chills and fever. Eyes:  Negative for visual disturbance. Respiratory:  Positive for shortness of breath. Cardiovascular:  Negative for chest pain. Gastrointestinal:  Negative for abdominal pain, diarrhea, nausea and vomiting. Genitourinary:  Negative for flank pain. Musculoskeletal:  Negative for gait problem. Skin:  Negative for rash. Neurological:  Positive for light-headedness. Negative for syncope, weakness and numbness. All other systems reviewed and are negative. Physical Exam  Physical Exam  Vitals and nursing note reviewed. Constitutional:       General: She is not in acute distress. Appearance: She is well-developed. She is not ill-appearing. HENT:      Head: Normocephalic and atraumatic.       Nose: Nose normal.      Mouth/Throat:      Mouth: Mucous membranes are moist.   Eyes: Extraocular Movements: Extraocular movements intact. Conjunctiva/sclera: Conjunctivae normal.      Pupils: Pupils are equal, round, and reactive to light. Cardiovascular:      Rate and Rhythm: Normal rate and regular rhythm. Heart sounds: No murmur heard. No friction rub. No gallop. Pulmonary:      Effort: Pulmonary effort is normal.      Breath sounds: Normal breath sounds. No wheezing, rhonchi or rales. Abdominal:      General: There is no distension. Palpations: Abdomen is soft. Tenderness: There is no abdominal tenderness. Musculoskeletal:         General: No swelling or tenderness. Normal range of motion. Cervical back: Normal range of motion and neck supple. Skin:     General: Skin is warm and dry. Coloration: Skin is not pale. Findings: No rash. Neurological:      General: No focal deficit present. Mental Status: She is alert and oriented to person, place, and time. Cranial Nerves: No cranial nerve deficit. Sensory: No sensory deficit. Motor: No weakness.    Psychiatric:         Behavior: Behavior normal.         Vital Signs  ED Triage Vitals   Temperature Pulse Respirations Blood Pressure SpO2   12/05/23 1233 12/05/23 1233 12/05/23 1233 12/05/23 1329 12/05/23 1233   98.2 °F (36.8 °C) 88 16 161/70 100 %      Temp Source Heart Rate Source Patient Position - Orthostatic VS BP Location FiO2 (%)   12/05/23 1233 12/05/23 1329 12/05/23 1329 12/05/23 1233 --   Oral Monitor Lying - Orthostatic VS Right arm       Pain Score       12/05/23 1233       No Pain           Vitals:    12/05/23 1332 12/05/23 1333 12/05/23 1335 12/05/23 1526   BP: 140/67 127/66 126/65 128/59   Pulse: 74 76 80 73   Patient Position - Orthostatic VS: Sitting - Orthostatic VS Standing - Orthostatic VS Standing for 3 minutes - Orthostatic VS Sitting         Visual Acuity      ED Medications  Medications   sodium chloride 0.9 % bolus 1,000 mL (0 mL Intravenous Stopped 12/5/23 1544)       Diagnostic Studies  Results Reviewed       Procedure Component Value Units Date/Time    HS Troponin I 2hr [[id number]]  (Normal) Collected: 12/05/23 1511    Lab Status: Final result Specimen: Blood from Arm, Right Updated: 12/05/23 1537     hs TnI 2hr 3 ng/L      Delta 2hr hsTnI -1 ng/L     HS Troponin I 4hr [[id number]]     Lab Status: No result Specimen: Blood     HS Troponin 0hr (reflex protocol) [[id number]]  (Normal) Collected: 12/05/23 1328    Lab Status: Final result Specimen: Blood from Arm, Right Updated: 12/05/23 1400     hs TnI 0hr 4 ng/L     Comprehensive metabolic panel [[id number]]  (Abnormal) Collected: 12/05/23 1328    Lab Status: Final result Specimen: Blood from Arm, Right Updated: 12/05/23 1357     Sodium 137 mmol/L      Potassium 4.0 mmol/L      Chloride 100 mmol/L      CO2 28 mmol/L      ANION GAP 9 mmol/L      BUN 25 mg/dL      Creatinine 1.04 mg/dL      Glucose 98 mg/dL      Calcium 10.5 mg/dL      AST 15 U/L      ALT 10 U/L      Alkaline Phosphatase 41 U/L      Total Protein 7.4 g/dL      Albumin 4.5 g/dL      Total Bilirubin 0.41 mg/dL      eGFR 51 ml/min/1.73sq m     Narrative:      [place] guidelines for Chronic Kidney Disease (CKD):     Stage 1 with normal or high GFR (GFR > 90 mL/min/1.73 square meters)    Stage 2 Mild CKD (GFR = 60-89 mL/min/1.73 square meters)    Stage 3A Moderate CKD (GFR = 45-59 mL/min/1.73 square meters)    Stage 3B Moderate CKD (GFR = 30-44 mL/min/1.73 square meters)    Stage 4 Severe CKD (GFR = 15-29 mL/min/1.73 square meters)    Stage 5 End Stage CKD (GFR <15 mL/min/1.73 square meters)  Note: GFR calculation is accurate only with a steady state creatinine    Magnesium [[id number]]  (Normal) Collected: 12/05/23 1328    Lab Status: Final result Specimen: Blood from Arm, Right Updated: 12/05/23 1357     Magnesium 1.9 mg/dL     CBC and differential [[id number]]  (Abnormal) Collected: 12/05/23 1343    Lab Status: Final result Specimen: Blood from Arm, Right Updated: 12/05/23 1335     WBC 10.39 Thousand/uL      RBC 3.96 Million/uL      Hemoglobin 12.6 g/dL      Hematocrit 37.7 %      MCV 95 fL      MCH 31.8 pg      MCHC 33.4 g/dL      RDW 12.2 %      MPV 9.2 fL      Platelets 127 Thousands/uL      nRBC 0 /100 WBCs      Neutrophils Relative 74 %      Immat GRANS % 0 %      Lymphocytes Relative 19 %      Monocytes Relative 5 %      Eosinophils Relative 1 %      Basophils Relative 1 %      Neutrophils Absolute 7.78 Thousands/µL      Immature Grans Absolute 0.02 Thousand/uL      Lymphocytes Absolute 1.94 Thousands/µL      Monocytes Absolute 0.55 Thousand/µL      Eosinophils Absolute 0.05 Thousand/µL      Basophils Absolute 0.05 Thousands/µL                    XR chest 1 view portable   ED Interpretation by Lisa Spain MD (12/05 1438)   No infiltrate or pneumothorax. Independently interpreted by me. CT head without contrast   Final Result by Katerina Woodard MD (12/05 1409)      No acute intracranial abnormality. Sinus disease. Workstation performed: [id number]                    Procedures  Procedures         ED Course  ED Course as of 12/05/23 1619   Tue Dec 05, 2023   1335 CBC and differential(!)  Nonspecific mild leukocytosis. 1400 Magnesium: 1.9   1401 hs TnI 0hr: 4   1401 Comprehensive metabolic panel(!)   0915 Delta 2hr hsTnI: -1                               SBIRT 20yo+      Flowsheet Row Most Recent Value   Initial Alcohol Screen: US AUDIT-C     1. How often do you have a drink containing alcohol? 2 Filed at: 12/05/2023 1232   2. How many drinks containing alcohol do you have on a typical day you are drinking? 0 Filed at: 12/05/2023 1232   3a. Male UNDER 65: How often do you have five or more drinks on one occasion? 0 Filed at: 12/05/2023 1232   3b. FEMALE Any Age, or MALE 65+: How often do you have 4 or more drinks on one occassion?  0 Filed at: 12/05/2023 1232   Audit-C Score 2 Filed at: 12/05/2023 1232   SLY: How many times in the past year have you. .. Used an illegal drug or used a prescription medication for non-medical reasons? Never Filed at: 12/05/2023 1232                      Medical Decision Making  61-year-old female presenting for evaluation of an episode of lightheadedness. Stating that she feels "fuzzy". Vital signs stable on arrival.  Patient with an overall benign examination. Differential diagnoses include but not limited to arrhythmia, anemia, ACS, intracranial hemorrhage, intracranial mass, orthostasis. Labs unremarkable including delta troponin. EKG without arrhythmia noted. Orthostatic vital signs within normal limits. CT head unremarkable. Patient treated with IV fluids. Symptoms are resolved at this time. Patient is otherwise stable for discharge. Advised follow-up with PCP. Return precaution discussed. Problems Addressed:  Lightheadedness: acute illness or injury    Amount and/or Complexity of Data Reviewed  Labs: ordered. Decision-making details documented in ED Course. Radiology: ordered and independent interpretation performed. ECG/medicine tests: ordered and independent interpretation performed. Decision-making details documented in ED Course. Disposition  Final diagnoses:   Lightheadedness     Time reflects when diagnosis was documented in both MDM as applicable and the Disposition within this note       Time User Action Codes Description Comment    12/5/2023  3:42 PM Herb Diss Add [R42] [address] [facility]           ED Disposition       ED Disposition   Discharge    Condition   Stable    Date/Time   Tue Dec 5, 2023 [address] discharge to home/self care.                    Follow-up Information       Follow up With Specialties Details Why Contact Info    Hunter Gutierrez,  Family Medicine Schedule an appointment as soon as possible for a visit   [address] [facility]  [phone]              Discharge Medication List as of 12/5/2023  3:42 PM        CONTINUE these medications which have NOT CHANGED    Details   acetaminophen (TYLENOL) 500 mg tablet Take 500 mg by mouth every 6 (six) hours as needed for mild pain., Historical Med      Cholecalciferol (VITAMIN D3) 5000 units CAPS Take 2,000 Units by mouth every other day  , Starting Mon 6/5/2017, Historical Med      ibuprofen (MOTRIN) 600 mg tablet Take 1 tablet (600 mg total) by mouth 3 (three) times a day as needed for mild pain, Starting Wed 1/11/2023, Normal      lisinopril-hydrochlorothiazide (PRINZIDE,ZESTORETIC) 20-25 MG per tablet Take 1 tablet by mouth daily, Starting Wed 5/24/2023, Normal      pravastatin (PRAVACHOL) 20 mg tablet Take 1 tablet (20 mg total) by mouth daily at bedtime, Starting Wed 5/24/2023, Normal             No discharge procedures on file.     PDMP Review         Value Time User    PDMP Reviewed  Yes 2/2/2022 11:19 AM Arvin Alvarado PA-C            ED Provider  Electronically Signed by             Dorita Alva MD  12/05/23 8194

## 2023-12-05 NOTE — TELEPHONE ENCOUNTER
Patient will proceed to the ER for her symptoms.    Was unable to make her regular scheduled appt this morning

## 2023-12-05 NOTE — DISCHARGE INSTRUCTIONS
Follow-up with your primary care physician. Stay well-hydrated. Please return to the emergency department if you develop worsening symptoms, loss of consciousness, chest pain, or anything else concerning to you.

## 2023-12-05 NOTE — ED NOTES
D/c reviewed with pt. Pt verbalized understanding and has no further questions at this time.       Miguel Padilla RN  12/05/23 7314

## 2023-12-09 LAB
ATRIAL RATE: 87 BPM
P AXIS: 50 DEGREES
PR INTERVAL: 182 MS
QRS AXIS: 55 DEGREES
QRSD INTERVAL: 80 MS
QT INTERVAL: 368 MS
QTC INTERVAL: 442 MS
T WAVE AXIS: 58 DEGREES
VENTRICULAR RATE: 87 BPM

## 2023-12-27 ENCOUNTER — TELEPHONE (OUTPATIENT)
Age: 78
End: 2023-12-27

## 2023-12-27 NOTE — TELEPHONE ENCOUNTER
Patient called in needing to schedule an ER f/u with Dr. Guzman because she is still having arm pain and has questions about the AVS from the ER visit stating magnesium at the bottom but unsure why it said that. Please advise no appts available before patients scheduled 1/12/24 appt.

## 2023-12-28 ENCOUNTER — OFFICE VISIT (OUTPATIENT)
Dept: FAMILY MEDICINE CLINIC | Facility: CLINIC | Age: 78
End: 2023-12-28
Payer: MEDICARE

## 2023-12-28 VITALS
HEIGHT: 66 IN | HEART RATE: 72 BPM | RESPIRATION RATE: 16 BRPM | SYSTOLIC BLOOD PRESSURE: 128 MMHG | OXYGEN SATURATION: 98 % | DIASTOLIC BLOOD PRESSURE: 64 MMHG | WEIGHT: 152 LBS | BODY MASS INDEX: 24.43 KG/M2

## 2023-12-28 DIAGNOSIS — M50.120 CERVICAL DISC DISORDER WITH RADICULOPATHY OF MID-CERVICAL REGION: ICD-10-CM
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
## 2023-12-28 DIAGNOSIS — M48.02 CERVICAL SPINAL STENOSIS: ICD-10-CM

## 2023-12-28 PROBLEM — R11.2 PONV (POSTOPERATIVE NAUSEA AND VOMITING): Status: RESOLVED | Noted: 2023-06-21 | Resolved: 2023-12-28

## 2023-12-28 PROBLEM — Z98.890 PONV (POSTOPERATIVE NAUSEA AND VOMITING): Status: RESOLVED | Noted: 2023-06-21 | Resolved: 2023-12-28

## 2023-12-28 PROCEDURE — 99214 OFFICE O/P EST MOD 30 MIN: CPT | Performed by: FAMILY MEDICINE

## 2023-12-28 RX ORDER — PREGABALIN 25 MG/1
25 CAPSULE ORAL 3 TIMES DAILY
Qty: 60 CAPSULE | Refills: 0 | Status: SHIPPED | OUTPATIENT
Start: 2023-12-28

## 2023-12-28 NOTE — ASSESSMENT & PLAN NOTE
- Check MRI of the cervical spine    -Referral back to pain management for reevaluation and possibly repeat corticosteroid injection    -Will try placing the patient on Lyrica 25 mg twice daily to see if that provides some relief of her pain.  She lives alone.  Reluctant to provide narcotic pain medications    Prior to prescribing the controlled substance, a patient search was performed on the [place] prescription drug monitoring program web site.  There was no evidence of diversion or misuse.  Prescription provided

## 2023-12-28 NOTE — PROGRESS NOTES
Subjective:      Patient ID: Laura Granados is a 78 y.o. female.    78-year-old female presents to the office primarily complaining of pain in her left shoulder going down her left arm and especially in her elbow which has been present since she had work related accident 2 years ago.  According to the patient she finally settled her case with prior employers insurance even though she is still having significant pain in her left arm, left shoulder.  She does have history of cervical spinal stenosis.  She finds that as she turns her head in a certain direction she will get sharp burning pain that goes down her left arm into her left hand.  She was seen at ER 3 weeks ago for a near syncopal event while she was taking a hot shower.  No further episodes since that time.  Patient was going to chiropractor regarding her neck and work related injury but she then she settled her claim she is no longer seeing chiropractor, no longer going for therapy.  April of last year she had received epidural corticosteroid injections through pain management.  She does take ibuprofen.  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, unspecified     Last assessed: 10/17/13   • Colon polyps    • Depression    • Hyperlipidemia    • Hypertension    • Lumbago     last assessed: 1/15/14   • Menopause    • Neuropathy    • Ptosis, left eyelid     last assessed: 10/16/15   • Shortness of breath    • Vertigo     last assessed: 10/26/15       Family History   Problem Relation Age of Onset   • Heart disease Other         cardiac disorder       Past Surgical History:   Procedure Laterality Date   • BLADDER SURGERY     • HYSTERECTOMY     • HI COLONOSCOPY FLX DX W/COLLJ SPEC WHEN PFRMD N/A 4/11/2016    Procedure: COLONOSCOPY;  Surgeon: Anna Valencia MD;  Location: [facility];   "Service: Gastroenterology        reports that she has never smoked. She has never used smokeless tobacco. She reports current alcohol use. She reports that she does not use drugs.      Current Outpatient Medications:   •  acetaminophen (TYLENOL) 500 mg tablet, Take 500 mg by mouth every 6 (six) hours as needed for mild pain., Disp: , Rfl:   •  Cholecalciferol (VITAMIN D3) 5000 units CAPS, Take 2,000 Units by mouth every other day  , Disp: , Rfl:   •  ibuprofen (MOTRIN) 600 mg tablet, Take 1 tablet (600 mg total) by mouth 3 (three) times a day as needed for mild pain, Disp: 90 tablet, Rfl: 2  •  lisinopril-hydrochlorothiazide (PRINZIDE,ZESTORETIC) 20-25 MG per tablet, Take 1 tablet by mouth daily, Disp: 90 tablet, Rfl: 1  •  pravastatin (PRAVACHOL) 20 mg tablet, Take 1 tablet (20 mg total) by mouth daily at bedtime, Disp: 90 tablet, Rfl: 1  •  pregabalin (LYRICA) 25 mg capsule, Take 1 capsule (25 mg total) by mouth 3 (three) times a day, Disp: 60 capsule, Rfl: 0    The following portions of the patient's history were reviewed and updated as appropriate: allergies, current medications, past family history, past medical history, past social history, past surgical history and problem list.    Review of Systems   Constitutional: Negative.    HENT: Negative.     Eyes: Negative.  Visual disturbance: Blind left eye.        Blind left eye   Respiratory: Negative.     Cardiovascular: Negative.  Negative for leg swelling.   Gastrointestinal: Negative.    Endocrine: Negative.    Genitourinary: Negative.    Musculoskeletal:  Positive for arthralgias, back pain and neck pain. Negative for joint swelling.   Skin: Negative.    Allergic/Immunologic: Negative.    Neurological:  Positive for weakness and numbness (right ankle and left arm). Negative for dizziness, syncope and light-headedness.   Hematological: Negative.    Psychiatric/Behavioral: Negative.             Objective:    /64   Pulse 72   Resp 16   Ht 5' 6\" (1.676 " m)   Wt 68.9 kg (152 lb)   SpO2 98%   BMI 24.53 kg/m²      Physical Exam  Vitals and nursing note reviewed.   Constitutional:       General: She is not in acute distress.     Appearance: Normal appearance. She is well-developed and normal weight. She is not diaphoretic.   HENT:      Head: Normocephalic and atraumatic.   Eyes:      Extraocular Movements: Extraocular movements intact.      Conjunctiva/sclera: Conjunctivae normal.      Pupils: Pupils are equal, round, and reactive to light.      Comments: Ptosis of left upper eyelid, legally blind left eye   Cardiovascular:      Rate and Rhythm: Normal rate and regular rhythm.      Pulses: Normal pulses.      Heart sounds: Normal heart sounds. No murmur heard.  Pulmonary:      Effort: Pulmonary effort is normal.      Breath sounds: Normal breath sounds.   Musculoskeletal:         General: Tenderness present.      Cervical back: Normal range of motion and neck supple.      Comments: Good range of motion at the left elbow.  No restriction.  Patient does have positive Spurling maneuver on the left side.  She does have good range of motion of her arm.   Skin:     General: Skin is warm and dry.      Findings: No rash.   Neurological:      General: No focal deficit present.      Mental Status: She is alert and oriented to person, place, and time. Mental status is at baseline.      Deep Tendon Reflexes: Reflexes are normal and symmetric.   Psychiatric:         Mood and Affect: Mood normal.         Behavior: Behavior normal.         Thought Content: Thought content normal.         Judgment: Judgment normal.           Recent Results (from the past 1008 hour(s))   CBC and differential    Collection Time: 11/22/23 12:29 PM   Result Value Ref Range    WBC 8.30 4.31 - 10.16 Thousand/uL    RBC 4.09 3.81 - 5.12 Million/uL    Hemoglobin 13.0 11.5 - 15.4 g/dL    Hematocrit 39.7 34.8 - 46.1 %    MCV 97 82 - 98 fL    MCH 31.8 26.8 - 34.3 pg    MCHC 32.7 31.4 - 37.4 g/dL    RDW 12.3  11.6 - 15.1 %    MPV 10.0 8.9 - 12.7 fL    Platelets 289 149 - 390 Thousands/uL    nRBC 0 /100 WBCs    Neutrophils Relative 56 43 - 75 %    Immat GRANS % 0 0 - 2 %    Lymphocytes Relative 34 14 - 44 %    Monocytes Relative 8 4 - 12 %    Eosinophils Relative 1 0 - 6 %    Basophils Relative 1 0 - 1 %    Neutrophils Absolute 4.69 1.85 - 7.62 Thousands/µL    Immature Grans Absolute 0.02 0.00 - 0.20 Thousand/uL    Lymphocytes Absolute 2.79 0.60 - 4.47 Thousands/µL    Monocytes Absolute 0.67 0.17 - 1.22 Thousand/µL    Eosinophils Absolute 0.09 0.00 - 0.61 Thousand/µL    Basophils Absolute 0.04 0.00 - 0.10 Thousands/µL   Comprehensive metabolic panel    Collection Time: 11/22/23 12:29 PM   Result Value Ref Range    Sodium 134 (L) 135 - 147 mmol/L    Potassium 3.9 3.5 - 5.3 mmol/L    Chloride 99 96 - 108 mmol/L    CO2 31 21 - 32 mmol/L    ANION GAP 4 mmol/L    BUN 25 5 - 25 mg/dL    Creatinine 0.91 0.60 - 1.30 mg/dL    Glucose, Fasting 93 65 - 99 mg/dL    Calcium 9.6 8.4 - 10.2 mg/dL    AST 15 13 - 39 U/L    ALT 9 7 - 52 U/L    Alkaline Phosphatase 46 34 - 104 U/L    Total Protein 7.4 6.4 - 8.4 g/dL    Albumin 4.5 3.5 - 5.0 g/dL    Total Bilirubin 0.61 0.20 - 1.00 mg/dL    eGFR 60 ml/min/1.73sq m   Lipid panel    Collection Time: 11/22/23 12:29 PM   Result Value Ref Range    Cholesterol 232 (H) See Comment mg/dL    Triglycerides 128 See Comment mg/dL    HDL, Direct 59 >=50 mg/dL    LDL Calculated 147 (H) 0 - 100 mg/dL    Non-HDL-Chol (CHOL-HDL) 173 mg/dl   TSH, 3rd generation with Free T4 reflex    Collection Time: 11/22/23 12:29 PM   Result Value Ref Range    TSH 3RD GENERATON 1.766 0.450 - 4.500 uIU/mL   Vitamin D 1,25 dihydroxy    Collection Time: 11/22/23 12:29 PM   Result Value Ref Range    Vit D, 1,25-Dihydroxy 63.5 24.8 - 81.5 pg/mL   ECG 12 lead    Collection Time: 12/05/23 12:33 PM   Result Value Ref Range    Ventricular Rate 87 BPM    Atrial Rate 87 BPM    MS Interval 182 ms    QRSD Interval 80 ms    QT Interval  "368 ms    QTC Interval 442 ms    P Axis 50 degrees    QRS Axis 55 degrees    T Wave Axis 58 degrees   CBC and differential    Collection Time: 12/05/23  1:28 PM   Result Value Ref Range    WBC 10.39 (H) 4.31 - 10.16 Thousand/uL    RBC 3.96 3.81 - 5.12 Million/uL    Hemoglobin 12.6 11.5 - 15.4 g/dL    Hematocrit 37.7 34.8 - 46.1 %    MCV 95 82 - 98 fL    MCH 31.8 26.8 - 34.3 pg    MCHC 33.4 31.4 - 37.4 g/dL    RDW 12.2 11.6 - 15.1 %    MPV 9.2 8.9 - 12.7 fL    Platelets 256 149 - 390 Thousands/uL    nRBC 0 /100 WBCs    Neutrophils Relative 74 43 - 75 %    Immat GRANS % 0 0 - 2 %    Lymphocytes Relative 19 14 - 44 %    Monocytes Relative 5 4 - 12 %    Eosinophils Relative 1 0 - 6 %    Basophils Relative 1 0 - 1 %    Neutrophils Absolute 7.78 (H) 1.85 - 7.62 Thousands/µL    Immature Grans Absolute 0.02 0.00 - 0.20 Thousand/uL    Lymphocytes Absolute 1.94 0.60 - 4.47 Thousands/µL    Monocytes Absolute 0.55 0.17 - 1.22 Thousand/µL    Eosinophils Absolute 0.05 0.00 - 0.61 Thousand/µL    Basophils Absolute 0.05 0.00 - 0.10 Thousands/µL   Comprehensive metabolic panel    Collection Time: 12/05/23  1:28 PM   Result Value Ref Range    Sodium 137 135 - 147 mmol/L    Potassium 4.0 3.5 - 5.3 mmol/L    Chloride 100 96 - 108 mmol/L    CO2 28 21 - 32 mmol/L    ANION GAP 9 mmol/L    BUN 25 5 - 25 mg/dL    Creatinine 1.04 0.60 - 1.30 mg/dL    Glucose 98 65 - 140 mg/dL    Calcium 10.5 (H) 8.4 - 10.2 mg/dL    AST 15 13 - 39 U/L    ALT 10 7 - 52 U/L    Alkaline Phosphatase 41 34 - 104 U/L    Total Protein 7.4 6.4 - 8.4 g/dL    Albumin 4.5 3.5 - 5.0 g/dL    Total Bilirubin 0.41 0.20 - 1.00 mg/dL    eGFR 51 ml/min/1.73sq m   Magnesium    Collection Time: 12/05/23  1:28 PM   Result Value Ref Range    Magnesium 1.9 1.9 - 2.7 mg/dL   HS Troponin 0hr (reflex protocol)    Collection Time: 12/05/23  1:28 PM   Result Value Ref Range    hs TnI 0hr 4 \"Refer to ACS Flowchart\"- see link ng/L   HS Troponin I 2hr    Collection Time: 12/05/23  3:11 PM " "  Result Value Ref Range    hs TnI 2hr 3 \"Refer to ACS Flowchart\"- see link ng/L    Delta 2hr hsTnI -1 <20 ng/L       Assessment/Plan:    Cervical disc disorder with radiculopathy of mid-cervical region  - Cervical spinal stenosis.  It appears as though Dr. Jane had ordered a repeat MRI of her cervical spine but that was never completed.  Order provided for repeat MRI of the cervical spine    -Patient only received 1 corticosteroid injection through pain management.  There was some relief that was documented but patient does not recall.  Referral back to pain management    -Symptoms are likely exacerbated following work related injury however patient settled her Workmen's Compensation claim    Cervical spinal stenosis  - Check MRI of the cervical spine    -Referral back to pain management for reevaluation and possibly repeat corticosteroid injection    -Will try placing the patient on Lyrica 25 mg twice daily to see if that provides some relief of her pain.  She lives alone.  Reluctant to provide narcotic pain medications    Prior to prescribing the controlled substance, a patient search was performed on the [place] prescription drug monitoring program web site.  There was no evidence of diversion or misuse.  Prescription provided              Problem List Items Addressed This Visit        Nervous and Auditory    Cervical disc disorder with radiculopathy of mid-cervical region     - Cervical spinal stenosis.  It appears as though Dr. Jane had ordered a repeat MRI of her cervical spine but that was never completed.  Order provided for repeat MRI of the cervical spine    -Patient only received 1 corticosteroid injection through pain management.  There was some relief that was documented but patient does not recall.  Referral back to pain management    -Symptoms are likely exacerbated following work related injury however patient settled her Workmen's Compensation claim         Relevant Orders    MRI cervical " spine wo contrast    Ambulatory referral to Spine [facility] Pain Management       Other    Cervical spinal stenosis     - Check MRI of the cervical spine    -Referral back to pain management for reevaluation and possibly repeat corticosteroid injection    -Will try placing the patient on Lyrica 25 mg twice daily to see if that provides some relief of her pain.  She lives alone.  Reluctant to provide narcotic pain medications    Prior to prescribing the controlled substance, a patient search was performed on the [place] prescription drug monitoring program web site.  There was no evidence of diversion or misuse.  Prescription provided           Relevant Medications    pregabalin (LYRICA) 25 mg capsule    Other Relevant Orders    MRI cervical spine wo contrast    RESOLVED: Work related injury - Primary

## 2023-12-28 NOTE — ASSESSMENT & PLAN NOTE
- Cervical spinal stenosis.  It appears as though Dr. Jane had ordered a repeat MRI of her cervical spine but that was never completed.  Order provided for repeat MRI of the cervical spine    -Patient only received 1 corticosteroid injection through pain management.  There was some relief that was documented but patient does not recall.  Referral back to pain management    -Symptoms are likely exacerbated following work related injury however patient settled her Workmen's Compensation claim

## 2023-12-29 ENCOUNTER — TELEPHONE (OUTPATIENT)
Dept: ADMINISTRATIVE | Facility: HOSPITAL | Age: 78
End: 2023-12-29

## 2023-12-29 NOTE — TELEPHONE ENCOUNTER
Rob Guzman!     I just wanted to make you aware that I did reach out to pt in regards to her MRI. Pt stated she needs an open MRI and contacting an outside facility to get that scheduled.

## 2024-01-05 ENCOUNTER — RA CDI HCC (OUTPATIENT)
Dept: OTHER | Facility: HOSPITAL | Age: 79
End: 2024-01-05

## 2024-01-12 ENCOUNTER — OFFICE VISIT (OUTPATIENT)
Dept: FAMILY MEDICINE CLINIC | Facility: CLINIC | Age: 79
End: 2024-01-12
Payer: MEDICARE

## 2024-01-12 VITALS
WEIGHT: 155 LBS | BODY MASS INDEX: 24.91 KG/M2 | DIASTOLIC BLOOD PRESSURE: 70 MMHG | OXYGEN SATURATION: 96 % | RESPIRATION RATE: 16 BRPM | HEIGHT: 66 IN | SYSTOLIC BLOOD PRESSURE: 120 MMHG | HEART RATE: 64 BPM

## 2024-01-12 DIAGNOSIS — I10 BENIGN ESSENTIAL HYPERTENSION: Primary | ICD-10-CM

## 2024-01-12 DIAGNOSIS — E78.2 MIXED HYPERLIPIDEMIA: ICD-10-CM

## 2024-01-12 DIAGNOSIS — Z12.39 ENCOUNTER FOR SCREENING FOR MALIGNANT NEOPLASM OF BREAST, UNSPECIFIED SCREENING MODALITY: ICD-10-CM

## 2024-01-12 DIAGNOSIS — I10 ESSENTIAL HYPERTENSION: ICD-10-CM

## 2024-01-12 DIAGNOSIS — Z23 FLU VACCINE NEED: ICD-10-CM

## 2024-01-12 DIAGNOSIS — M48.02 CERVICAL SPINAL STENOSIS: ICD-10-CM

## 2024-01-12 DIAGNOSIS — M50.120 CERVICAL DISC DISORDER WITH RADICULOPATHY OF MID-CERVICAL REGION: ICD-10-CM

## 2024-01-12 DIAGNOSIS — E55.9 VITAMIN D DEFICIENCY: ICD-10-CM

## 2024-01-12 PROCEDURE — 90662 IIV NO PRSV INCREASED AG IM: CPT | Performed by: FAMILY MEDICINE

## 2024-01-12 PROCEDURE — G0008 ADMIN INFLUENZA VIRUS VAC: HCPCS | Performed by: FAMILY MEDICINE

## 2024-01-12 PROCEDURE — 99214 OFFICE O/P EST MOD 30 MIN: CPT | Performed by: FAMILY MEDICINE

## 2024-01-12 RX ORDER — PRAVASTATIN SODIUM 20 MG
20 TABLET ORAL
Qty: 90 TABLET | Refills: 1 | Status: SHIPPED | OUTPATIENT
Start: 2024-01-12

## 2024-01-12 RX ORDER — PREGABALIN 25 MG/1
25 CAPSULE ORAL 3 TIMES DAILY
Qty: 270 CAPSULE | Refills: 1 | Status: SHIPPED | OUTPATIENT
Start: 2024-01-12

## 2024-01-12 RX ORDER — LISINOPRIL AND HYDROCHLOROTHIAZIDE 25; 20 MG/1; MG/1
1 TABLET ORAL DAILY
Qty: 90 TABLET | Refills: 1 | Status: SHIPPED | OUTPATIENT
Start: 2024-01-12

## 2024-01-12 NOTE — ASSESSMENT & PLAN NOTE
Hypertension remains very well-controlled on lisinopril/hydrochlorothiazide.  Continue same.  Refill provided.  Reevaluate in 5 months

## 2024-01-12 NOTE — PROGRESS NOTES
Subjective:      Patient ID: Laura Granados is a 78 y.o. female.    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  She did have labs completed in November which were reviewed previously with her.  At her last appointment I did start her on Lyrica 25 mg 3 times daily for her cervical radiculopathy/neuropathy.  Patient states that Lyrica has made a significant improvement in her level of pain.  When she is taking medication 3 times a day she really does not have any pain.  She still has paresthesia going into her left index finger but that is manageable and tolerable.  She was even able to shovel snow from her sidewalk without much difficulty.  She just took her time.  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, unspecified     Last assessed: 10/17/13   • Colon polyps    • Depression    • Hyperlipidemia    • Hypertension    • Lumbago     last assessed: 1/15/14   • Menopause    • Neuropathy    • Ptosis, left eyelid     last assessed: 10/16/15   • Shortness of breath    • Vertigo     last assessed: 10/26/15       Family History   Problem Relation Age of Onset   • Heart disease Other         cardiac disorder       Past Surgical History:   Procedure Laterality Date   • BLADDER SURGERY     • HYSTERECTOMY     • MO COLONOSCOPY FLX DX W/COLLJ SPEC WHEN PFRMD N/A 4/11/2016    Procedure: COLONOSCOPY;  Surgeon: Anna Valencia MD;  Location: [facility];  Service: Gastroenterology        reports that she has never smoked. She has never used smokeless tobacco. She reports current alcohol use. She reports that she does not use drugs.      Current Outpatient Medications:   •  lisinopril-hydrochlorothiazide (PRINZIDE,ZESTORETIC) 20-25 MG per tablet,  "Take 1 tablet by mouth daily, Disp: 90 tablet, Rfl: 1  •  pravastatin (PRAVACHOL) 20 mg tablet, Take 1 tablet (20 mg total) by mouth daily at bedtime, Disp: 90 tablet, Rfl: 1  •  pregabalin (LYRICA) 25 mg capsule, Take 1 capsule (25 mg total) by mouth 3 (three) times a day, Disp: 270 capsule, Rfl: 1  •  acetaminophen (TYLENOL) 500 mg tablet, Take 500 mg by mouth every 6 (six) hours as needed for mild pain., Disp: , Rfl:   •  Cholecalciferol (VITAMIN D3) 5000 units CAPS, Take 2,000 Units by mouth every other day  , Disp: , Rfl:   •  ibuprofen (MOTRIN) 600 mg tablet, Take 1 tablet (600 mg total) by mouth 3 (three) times a day as needed for mild pain, Disp: 90 tablet, Rfl: 2    The following portions of the patient's history were reviewed and updated as appropriate: allergies, current medications, past family history, past medical history, past social history, past surgical history and problem list.    Review of Systems   Constitutional: Negative.    HENT: Negative.     Eyes: Negative.  Visual disturbance: Blind left eye.   Respiratory: Negative.     Cardiovascular: Negative.    Gastrointestinal: Negative.    Endocrine: Negative.    Genitourinary: Negative.    Musculoskeletal:  Positive for neck pain.   Skin: Negative.    Allergic/Immunologic: Negative.    Neurological: Negative.    Hematological: Negative.    Psychiatric/Behavioral: Negative.             Objective:    /70   Pulse 64   Resp 16   Ht 5' 6\" (1.676 m)   Wt 70.3 kg (155 lb)   SpO2 96%   BMI 25.02 kg/m²      Physical Exam  Vitals and nursing note reviewed.   Constitutional:       General: She is not in acute distress.     Appearance: Normal appearance. She is well-developed and normal weight. She is not diaphoretic.   HENT:      Head: Normocephalic and atraumatic.   Eyes:      Extraocular Movements: Extraocular movements intact.      Conjunctiva/sclera: Conjunctivae normal.      Pupils: Pupils are equal, round, and reactive to light.      " Comments: Ptosis of left upper eyelid, legally blind left eye   Cardiovascular:      Rate and Rhythm: Normal rate and regular rhythm.      Pulses: Normal pulses.      Heart sounds: Normal heart sounds. No murmur heard.  Pulmonary:      Effort: Pulmonary effort is normal.      Breath sounds: Normal breath sounds.   Musculoskeletal:         General: No tenderness.      Cervical back: Normal range of motion and neck supple.      Comments: Good range of motion at the left elbow.  No restriction.  Patient does have positive Spurling maneuver on the left side.  She does have good range of motion of her arm.   Skin:     General: Skin is warm and dry.      Findings: No rash.   Neurological:      General: No focal deficit present.      Mental Status: She is alert and oriented to person, place, and time. Mental status is at baseline.      Deep Tendon Reflexes: Reflexes are normal and symmetric.   Psychiatric:         Mood and Affect: Mood normal.         Behavior: Behavior normal.         Thought Content: Thought content normal.         Judgment: Judgment normal.           Recent Results (from the past 1008 hour(s))   ECG 12 lead    Collection Time: 12/05/23 12:33 PM   Result Value Ref Range    Ventricular Rate 87 BPM    Atrial Rate 87 BPM    NJ Interval 182 ms    QRSD Interval 80 ms    QT Interval 368 ms    QTC Interval 442 ms    P Axis 50 degrees    QRS Axis 55 degrees    T Wave Axis 58 degrees   CBC and differential    Collection Time: 12/05/23  1:28 PM   Result Value Ref Range    WBC 10.39 (H) 4.31 - 10.16 Thousand/uL    RBC 3.96 3.81 - 5.12 Million/uL    Hemoglobin 12.6 11.5 - 15.4 g/dL    Hematocrit 37.7 34.8 - 46.1 %    MCV 95 82 - 98 fL    MCH 31.8 26.8 - 34.3 pg    MCHC 33.4 31.4 - 37.4 g/dL    RDW 12.2 11.6 - 15.1 %    MPV 9.2 8.9 - 12.7 fL    Platelets 256 149 - 390 Thousands/uL    nRBC 0 /100 WBCs    Neutrophils Relative 74 43 - 75 %    Immat GRANS % 0 0 - 2 %    Lymphocytes Relative 19 14 - 44 %    Monocytes  "Relative 5 4 - 12 %    Eosinophils Relative 1 0 - 6 %    Basophils Relative 1 0 - 1 %    Neutrophils Absolute 7.78 (H) 1.85 - 7.62 Thousands/µL    Immature Grans Absolute 0.02 0.00 - 0.20 Thousand/uL    Lymphocytes Absolute 1.94 0.60 - 4.47 Thousands/µL    Monocytes Absolute 0.55 0.17 - 1.22 Thousand/µL    Eosinophils Absolute 0.05 0.00 - 0.61 Thousand/µL    Basophils Absolute 0.05 0.00 - 0.10 Thousands/µL   Comprehensive metabolic panel    Collection Time: 12/05/23  1:28 PM   Result Value Ref Range    Sodium 137 135 - 147 mmol/L    Potassium 4.0 3.5 - 5.3 mmol/L    Chloride 100 96 - 108 mmol/L    CO2 28 21 - 32 mmol/L    ANION GAP 9 mmol/L    BUN 25 5 - 25 mg/dL    Creatinine 1.04 0.60 - 1.30 mg/dL    Glucose 98 65 - 140 mg/dL    Calcium 10.5 (H) 8.4 - 10.2 mg/dL    AST 15 13 - 39 U/L    ALT 10 7 - 52 U/L    Alkaline Phosphatase 41 34 - 104 U/L    Total Protein 7.4 6.4 - 8.4 g/dL    Albumin 4.5 3.5 - 5.0 g/dL    Total Bilirubin 0.41 0.20 - 1.00 mg/dL    eGFR 51 ml/min/1.73sq m   Magnesium    Collection Time: 12/05/23  1:28 PM   Result Value Ref Range    Magnesium 1.9 1.9 - 2.7 mg/dL   HS Troponin 0hr (reflex protocol)    Collection Time: 12/05/23  1:28 PM   Result Value Ref Range    hs TnI 0hr 4 \"Refer to ACS Flowchart\"- see link ng/L   HS Troponin I 2hr    Collection Time: 12/05/23  3:11 PM   Result Value Ref Range    hs TnI 2hr 3 \"Refer to ACS Flowchart\"- see link ng/L    Delta 2hr hsTnI -1 <20 ng/L       Assessment/Plan:    Benign essential hypertension  Hypertension remains very well-controlled on lisinopril/hydrochlorothiazide.  Continue same.  Refill provided.  Reevaluate in 5 months    Cervical disc disorder with radiculopathy of mid-cervical region  -Patient has history of cervical spinal stenosis.  Still an open order for MRI of her cervical spine    -Patient has had some improvement in her level of pain on Lyrica 25 mg 3 times daily.  I did provide refill of Lyrica    -Patient is scheduled for follow-up " with pain management on February 2.  I would like for the patient to keep that appointment.  She did have corticosteroid injections through pain management previously    Vitamin D deficiency  Continue on over-the-counter vitamin D supplementation.  Repeat vitamin D level to be done in 5 months    Mixed hyperlipidemia  Patient remains on pravastatin 20 mg once daily at bedtime.  Patient will have repeat lipid panel done in 5 months    Cervical spinal stenosis  Patient does have open order for MRI of the cervical spine.  She has been referred back to pain management however she has had improvement with Lyrica 25 mg 3 times daily.  This has provided relief of her pain without any significant side effects    Breast cancer screening  Advised patient that she has open order for screening mammogram.  She would stop screening mammograms at age 80.  Patient knows that she has open order.  She states that those mammograms heurt          Problem List Items Addressed This Visit        Cardiovascular and Mediastinum    Benign essential hypertension - Primary     Hypertension remains very well-controlled on lisinopril/hydrochlorothiazide.  Continue same.  Refill provided.  Reevaluate in 5 months         Relevant Medications    lisinopril-hydrochlorothiazide (PRINZIDE,ZESTORETIC) 20-25 MG per tablet       Nervous and Auditory    Cervical disc disorder with radiculopathy of mid-cervical region     -Patient has history of cervical spinal stenosis.  Still an open order for MRI of her cervical spine    -Patient has had some improvement in her level of pain on Lyrica 25 mg 3 times daily.  I did provide refill of Lyrica    -Patient is scheduled for follow-up with pain management on February 2.  I would like for the patient to keep that appointment.  She did have corticosteroid injections through pain management previously            Other    Breast cancer screening     Advised patient that she has open order for screening mammogram.   She would stop screening mammograms at age 80.  Patient knows that she has open order.  She states that those mammograms heurt         Cervical spinal stenosis     Patient does have open order for MRI of the cervical spine.  She has been referred back to pain management however she has had improvement with Lyrica 25 mg 3 times daily.  This has provided relief of her pain without any significant side effects         Relevant Medications    pregabalin (LYRICA) 25 mg capsule    Flu vaccine need    Relevant Orders    influenza vaccine, high-dose, PF 0.7 mL (FLUZONE HIGH-DOSE) (Completed)    Mixed hyperlipidemia     Patient remains on pravastatin 20 mg once daily at bedtime.  Patient will have repeat lipid panel done in 5 months         Relevant Medications    pravastatin (PRAVACHOL) 20 mg tablet    Other Relevant Orders    Comprehensive metabolic panel    Lipid panel    Vitamin D deficiency     Continue on over-the-counter vitamin D supplementation.  Repeat vitamin D level to be done in 5 months         Relevant Orders    Vitamin D 1,25 dihydroxy   Other Visit Diagnoses     Essential hypertension        Relevant Medications    lisinopril-hydrochlorothiazide (PRINZIDE,ZESTORETIC) 20-25 MG per tablet    Other Relevant Orders    CBC and differential    TSH, 3rd generation with Free T4 reflex

## 2024-01-12 NOTE — ASSESSMENT & PLAN NOTE
-Patient has history of cervical spinal stenosis.  Still an open order for MRI of her cervical spine    -Patient has had some improvement in her level of pain on Lyrica 25 mg 3 times daily.  I did provide refill of Lyrica    -Patient is scheduled for follow-up with pain management on February 2.  I would like for the patient to keep that appointment.  She did have corticosteroid injections through pain management previously

## 2024-01-12 NOTE — ASSESSMENT & PLAN NOTE
Advised patient that she has open order for screening mammogram.  She would stop screening mammograms at age 80.  Patient knows that she has open order.  She states that those mammograms heurt

## 2024-01-12 NOTE — ASSESSMENT & PLAN NOTE
Patient does have open order for MRI of the cervical spine.  She has been referred back to pain management however she has had improvement with Lyrica 25 mg 3 times daily.  This has provided relief of her pain without any significant side effects

## 2024-01-12 NOTE — ASSESSMENT & PLAN NOTE
Continue on over-the-counter vitamin D supplementation.  Repeat vitamin D level to be done in 5 months

## 2024-01-12 NOTE — ASSESSMENT & PLAN NOTE
Patient remains on pravastatin 20 mg once daily at bedtime.  Patient will have repeat lipid panel done in 5 months

## 2024-02-21 PROBLEM — Z12.39 BREAST CANCER SCREENING: Status: RESOLVED | Noted: 2018-06-18 | Resolved: 2024-02-21

## 2024-02-21 PROBLEM — Z00.00 MEDICARE ANNUAL WELLNESS VISIT, SUBSEQUENT: Status: RESOLVED | Noted: 2018-12-13 | Resolved: 2024-02-21

## 2024-05-20 ENCOUNTER — APPOINTMENT (OUTPATIENT)
Dept: LAB | Facility: CLINIC | Age: 79
End: 2024-05-20
Payer: MEDICARE

## 2024-05-20 DIAGNOSIS — E78.2 MIXED HYPERLIPIDEMIA: ICD-10-CM

## 2024-05-20 DIAGNOSIS — E55.9 VITAMIN D DEFICIENCY: ICD-10-CM

## 2024-05-20 DIAGNOSIS — I10 ESSENTIAL HYPERTENSION: ICD-10-CM

## 2024-05-20 LAB
ALBUMIN SERPL BCP-MCNC: 4.2 G/DL (ref 3.5–5)
ALP SERPL-CCNC: 40 U/L (ref 34–104)
ALT SERPL W P-5'-P-CCNC: 8 U/L (ref 7–52)
ANION GAP SERPL CALCULATED.3IONS-SCNC: 6 MMOL/L (ref 4–13)
AST SERPL W P-5'-P-CCNC: 15 U/L (ref 13–39)
BASOPHILS # BLD AUTO: 0.04 THOUSANDS/ÂΜL (ref 0–0.1)
BASOPHILS NFR BLD AUTO: 1 % (ref 0–1)
BILIRUB SERPL-MCNC: 0.66 MG/DL (ref 0.2–1)
BUN SERPL-MCNC: 17 MG/DL (ref 5–25)
CALCIUM SERPL-MCNC: 9.2 MG/DL (ref 8.4–10.2)
CHLORIDE SERPL-SCNC: 101 MMOL/L (ref 96–108)
CHOLEST SERPL-MCNC: 204 MG/DL
CO2 SERPL-SCNC: 30 MMOL/L (ref 21–32)
CREAT SERPL-MCNC: 0.77 MG/DL (ref 0.6–1.3)
EOSINOPHIL # BLD AUTO: 0.08 THOUSAND/ÂΜL (ref 0–0.61)
EOSINOPHIL NFR BLD AUTO: 1 % (ref 0–6)
ERYTHROCYTE [DISTWIDTH] IN BLOOD BY AUTOMATED COUNT: 12.7 % (ref 11.6–15.1)
GFR SERPL CREATININE-BSD FRML MDRD: 74 ML/MIN/1.73SQ M
GLUCOSE P FAST SERPL-MCNC: 90 MG/DL (ref 65–99)
HCT VFR BLD AUTO: 36.8 % (ref 34.8–46.1)
HDLC SERPL-MCNC: 62 MG/DL
HGB BLD-MCNC: 11.8 G/DL (ref 11.5–15.4)
IMM GRANULOCYTES # BLD AUTO: 0.02 THOUSAND/UL (ref 0–0.2)
IMM GRANULOCYTES NFR BLD AUTO: 0 % (ref 0–2)
LDLC SERPL CALC-MCNC: 122 MG/DL (ref 0–100)
LYMPHOCYTES # BLD AUTO: 2.26 THOUSANDS/ÂΜL (ref 0.6–4.47)
LYMPHOCYTES NFR BLD AUTO: 39 % (ref 14–44)
MCH RBC QN AUTO: 31.9 PG (ref 26.8–34.3)
MCHC RBC AUTO-ENTMCNC: 32.1 G/DL (ref 31.4–37.4)
MCV RBC AUTO: 100 FL (ref 82–98)
MONOCYTES # BLD AUTO: 0.56 THOUSAND/ÂΜL (ref 0.17–1.22)
MONOCYTES NFR BLD AUTO: 10 % (ref 4–12)
NEUTROPHILS # BLD AUTO: 2.87 THOUSANDS/ÂΜL (ref 1.85–7.62)
NEUTS SEG NFR BLD AUTO: 49 % (ref 43–75)
NONHDLC SERPL-MCNC: 142 MG/DL
NRBC BLD AUTO-RTO: 0 /100 WBCS
PLATELET # BLD AUTO: 244 THOUSANDS/UL (ref 149–390)
PMV BLD AUTO: 10.4 FL (ref 8.9–12.7)
POTASSIUM SERPL-SCNC: 3.9 MMOL/L (ref 3.5–5.3)
PROT SERPL-MCNC: 6.7 G/DL (ref 6.4–8.4)
RBC # BLD AUTO: 3.7 MILLION/UL (ref 3.81–5.12)
SODIUM SERPL-SCNC: 137 MMOL/L (ref 135–147)
TRIGL SERPL-MCNC: 99 MG/DL
TSH SERPL DL<=0.05 MIU/L-ACNC: 0.81 UIU/ML (ref 0.45–4.5)
WBC # BLD AUTO: 5.83 THOUSAND/UL (ref 4.31–10.16)

## 2024-05-20 PROCEDURE — 82652 VIT D 1 25-DIHYDROXY: CPT

## 2024-05-20 PROCEDURE — 80061 LIPID PANEL: CPT

## 2024-05-20 PROCEDURE — 84443 ASSAY THYROID STIM HORMONE: CPT

## 2024-05-20 PROCEDURE — 80053 COMPREHEN METABOLIC PANEL: CPT

## 2024-05-20 PROCEDURE — 36415 COLL VENOUS BLD VENIPUNCTURE: CPT

## 2024-05-20 PROCEDURE — 85025 COMPLETE CBC W/AUTO DIFF WBC: CPT

## 2024-05-22 LAB — 1,25(OH)2D3 SERPL-MCNC: 50.7 PG/ML (ref 24.8–81.5)

## 2024-06-04 ENCOUNTER — OFFICE VISIT (OUTPATIENT)
Dept: FAMILY MEDICINE CLINIC | Facility: CLINIC | Age: 79
End: 2024-06-04

## 2024-06-04 VITALS
RESPIRATION RATE: 16 BRPM | SYSTOLIC BLOOD PRESSURE: 114 MMHG | DIASTOLIC BLOOD PRESSURE: 62 MMHG | HEART RATE: 58 BPM | OXYGEN SATURATION: 97 % | WEIGHT: 149 LBS | BODY MASS INDEX: 23.95 KG/M2 | HEIGHT: 66 IN

## 2024-06-04 DIAGNOSIS — I10 ESSENTIAL HYPERTENSION: ICD-10-CM

## 2024-06-04 DIAGNOSIS — Z12.31 ENCOUNTER FOR SCREENING MAMMOGRAM FOR BREAST CANCER: Primary | ICD-10-CM

## 2024-06-04 DIAGNOSIS — M48.02 CERVICAL SPINAL STENOSIS: ICD-10-CM

## 2024-06-04 DIAGNOSIS — M50.120 CERVICAL DISC DISORDER WITH RADICULOPATHY OF MID-CERVICAL REGION: ICD-10-CM

## 2024-06-04 DIAGNOSIS — N95.1 SYMPTOMATIC MENOPAUSAL OR FEMALE CLIMACTERIC STATES: ICD-10-CM

## 2024-06-04 DIAGNOSIS — E55.9 VITAMIN D DEFICIENCY: ICD-10-CM

## 2024-06-04 DIAGNOSIS — E78.2 MIXED HYPERLIPIDEMIA: ICD-10-CM

## 2024-06-04 DIAGNOSIS — I10 BENIGN ESSENTIAL HYPERTENSION: ICD-10-CM

## 2024-06-04 DIAGNOSIS — Z78.0 POSTMENOPAUSAL: ICD-10-CM

## 2024-06-04 RX ORDER — IBUPROFEN 600 MG/1
600 TABLET ORAL 3 TIMES DAILY PRN
Qty: 90 TABLET | Refills: 2 | Status: SHIPPED | OUTPATIENT
Start: 2024-06-04

## 2024-06-04 RX ORDER — PREGABALIN 25 MG/1
25 CAPSULE ORAL 3 TIMES DAILY
Qty: 270 CAPSULE | Refills: 1 | Status: SHIPPED | OUTPATIENT
Start: 2024-06-04

## 2024-06-04 RX ORDER — PRAVASTATIN SODIUM 20 MG
20 TABLET ORAL
Qty: 90 TABLET | Refills: 1 | Status: SHIPPED | OUTPATIENT
Start: 2024-06-04

## 2024-06-04 RX ORDER — LISINOPRIL AND HYDROCHLOROTHIAZIDE 25; 20 MG/1; MG/1
1 TABLET ORAL DAILY
Qty: 90 TABLET | Refills: 1 | Status: SHIPPED | OUTPATIENT
Start: 2024-06-04

## 2024-06-04 NOTE — ASSESSMENT & PLAN NOTE
Patient remains on pravastatin 20 mg once daily at bedtime.  Patient will have repeat lipid panel done in 6 months

## 2024-06-04 NOTE — ASSESSMENT & PLAN NOTE
History of spinal stenosis.  Patient does get good relief with Lyrica.    She has seen pain management in the past.  She canceled her appointment in February even though I told her to keep that.  Order placed once again for pain management as patient has had good relief with corticosteroid injections in the past

## 2024-06-04 NOTE — PROGRESS NOTES
Assessment and Plan:     Problem List Items Addressed This Visit        Cardiovascular and Mediastinum    Benign essential hypertension     Hypertension remains very well-controlled on lisinopril/hydrochlorothiazide.  Continue same.  Refill provided.  Reevaluate in 6 months         Relevant Medications    lisinopril-hydrochlorothiazide (PRINZIDE,ZESTORETIC) 20-25 MG per tablet    RESOLVED: Essential hypertension    Relevant Medications    lisinopril-hydrochlorothiazide (PRINZIDE,ZESTORETIC) 20-25 MG per tablet    Other Relevant Orders    CBC and differential    TSH, 3rd generation with Free T4 reflex       Nervous and Auditory    Cervical disc disorder with radiculopathy of mid-cervical region     History of spinal stenosis.  Patient does get good relief with Lyrica.    She has seen pain management in the past.  She canceled her appointment in February even though I told her to keep that.  Order placed once again for pain management as patient has had good relief with corticosteroid injections in the past         Relevant Medications    ibuprofen (MOTRIN) 600 mg tablet       Orthopedic/Musculoskeletal    Cervical spinal stenosis     Continue Lyrica 25 mg 3 times daily         Relevant Medications    pregabalin (LYRICA) 25 mg capsule    ibuprofen (MOTRIN) 600 mg tablet    Other Relevant Orders    Ambulatory referral to Spine & Pain Management       Obstetrics/Gynecology    Encounter for screening mammogram for breast cancer - Primary     Screening mammogram once again ordered         Relevant Orders    Mammo screening bilateral w 3d & cad    Postmenopausal    Relevant Orders    DXA bone density spine hip and pelvis    Symptomatic menopausal or female climacteric states     DEXA scan ordered once again for bone density screening         Relevant Orders    DXA bone density spine hip and pelvis       Other    Mixed hyperlipidemia     Patient remains on pravastatin 20 mg once daily at bedtime.  Patient will have repeat  lipid panel done in 6 months         Relevant Medications    pravastatin (PRAVACHOL) 20 mg tablet    Other Relevant Orders    Comprehensive metabolic panel    Lipid panel    Vitamin D deficiency    Relevant Orders    Vitamin D 25 hydroxy       BMI Counseling: Body mass index is 24.05 kg/m². The BMI is above normal. Nutrition recommendations include reducing intake of saturated and trans fat and reducing intake of cholesterol. Rationale for BMI follow-up plan is due to patient being overweight or obese.     Depression Screening and Follow-up Plan: Patient was screened for depression during today's encounter. They screened negative with a PHQ-2 score of 0.      Preventive health issues were discussed with patient, and age appropriate screening tests were ordered as noted in patient's After Visit Summary.  Personalized health advice and appropriate referrals for health education or preventive services given if needed, as noted in patient's After Visit Summary.     History of Present Illness:     Patient presents for Medicare Annual Wellness visit    Patient Care Team:  Richi Guzman DO as PCP - General  MD Anna Bashir MD as Endoscopist  ADRIENNE Pardo MD Alison Benedetto, [place] (Chiropractic Medicine)     Problem List:     Patient Active Problem List   Diagnosis   • Benign essential hypertension   • Benign neoplasm of bone   • Disc degeneration, lumbar   • Facial nerve disorder   • Generalized osteoarthritis   • Mixed hyperlipidemia   • Symptomatic menopausal or female climacteric states   • Neuropathy of right foot   • Polyp of sigmoid colon   • Primary osteoarthritis of right knee   • Vitamin D deficiency   • Flu vaccine need   • 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   • Exposure to COVID-19 virus   • Encounter for screening mammogram for breast cancer   • Cervical disc disorder with radiculopathy of mid-cervical region   • Cervical spinal stenosis   • Postmenopausal       Past Medical and Surgical History:     Past Medical History:   Diagnosis Date   • Arthritis    • 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, unspecified     Last assessed: 10/17/13   • Colon polyps    • Depression    • Hyperlipidemia    • Hypertension    • Lumbago     last assessed: 1/15/14   • Menopause    • Neuropathy    • Ptosis, left eyelid     last assessed: 10/16/15   • Shortness of breath    • Vertigo     last assessed: 10/26/15     Past Surgical History:   Procedure Laterality Date   • BLADDER SURGERY     • HYSTERECTOMY     • NV COLONOSCOPY FLX DX W/COLLJ SPEC WHEN PFRMD N/A 4/11/2016    Procedure: COLONOSCOPY;  Surgeon: Anna Valencia MD;  Location: [facility];  Service: Gastroenterology      Family History:     Family History   Problem Relation Age of Onset   • Heart disease Other         cardiac disorder      Social History:     Social History     Socioeconomic History   • Marital status:      Spouse name: None   • Number of children: None   • Years of education: None   • Highest education level: None   Occupational History   • None   Tobacco Use   • Smoking status: Never   • Smokeless tobacco: Never   Vaping Use   • Vaping status: Never Used   Substance and Sexual Activity   • Alcohol use: Yes     Comment: social, rarely   • Drug use: No   • Sexual activity: None   Other Topics Concern   • None   Social History Narrative    Caffeine use    , per [employer]     Social Determinants of Health     Financial Resource Strain: Low Risk  (5/24/2023)    Overall Financial Resource Strain (CARDIA)    • Difficulty of Paying Living Expenses: Not very hard   Food Insecurity: No Food Insecurity (6/4/2024)    Hunger Vital Sign    • Worried About Running Out of Food in the Last Year: Never true    • Ran Out of Food in the Last Year: Never true   Transportation Needs: No Transportation Needs (6/4/2024)    PRAPARE - Transportation    • Lack of Transportation (Medical): No    • Lack of Transportation  (Non-Medical): No   Physical Activity: Not on file   Stress: Not on file   Social Connections: Not on file   Intimate Partner Violence: Not on file   Housing Stability: Low Risk  (6/4/2024)    Housing Stability Vital Sign    • Unable to Pay for Housing in the Last Year: No    • Number of Times Moved in the Last Year: 0    • Homeless in the Last Year: No       Medications and Allergies:     Current Outpatient Medications   Medication Sig Dispense Refill   • acetaminophen (TYLENOL) 500 mg tablet Take 500 mg by mouth every 6 (six) hours as needed for mild pain.     • Cholecalciferol (VITAMIN D3) 5000 units CAPS Take 2,000 Units by mouth every other day       • ibuprofen (MOTRIN) 600 mg tablet Take 1 tablet (600 mg total) by mouth 3 (three) times a day as needed for mild pain 90 tablet 2   • lisinopril-hydrochlorothiazide (PRINZIDE,ZESTORETIC) 20-25 MG per tablet Take 1 tablet by mouth daily 90 tablet 1   • pravastatin (PRAVACHOL) 20 mg tablet Take 1 tablet (20 mg total) by mouth daily at bedtime 90 tablet 1   • pregabalin (LYRICA) 25 mg capsule Take 1 capsule (25 mg total) by mouth 3 (three) times a day 270 capsule 1     No current facility-administered medications for this visit.     Allergies   Allergen Reactions   • Benzoyl Peroxide Rash      Immunizations:     Immunization History   Administered Date(s) Administered   • COVID-19 MODERNA VACC 0.5 ML IM 01/13/2021, 02/11/2021, 12/06/2021, 11/11/2022   • COVID-19 Pfizer mRNA vacc PF dannielle-sucrose 12 yr and older (Comirnaty) 11/14/2023   • COVID-19, unspecified 01/13/2021   • INFLUENZA 11/12/2022   • Influenza Split High Dose Preservative Free IM 09/24/2013, 10/08/2014, 10/26/2015, 12/01/2016, 12/12/2017   • Influenza, high dose seasonal 0.7 mL 12/13/2018, 12/16/2019, 10/19/2020, 11/01/2021, 01/12/2024   • Pneumococcal Conjugate 13-Valent 05/02/2016   • Pneumococcal Conjugate PCV 7 05/09/2011   • Pneumococcal Polysaccharide PPV23 05/09/2011      Health Maintenance:       "   Topic Date Due   • Breast Cancer Screening: Mammogram  12/28/2024 (Originally 8/7/1985)   • Hepatitis C Screening  06/09/2084 (Originally 1945)   • Colorectal Cancer Screening  Discontinued     There are no preventive care reminders to display for this patient.   Medicare Health Risk Assessment:     /62   Pulse 58   Resp 16   Ht 5' 6\" (1.676 m)   Wt 67.6 kg (149 lb)   SpO2 97%   BMI 24.05 kg/m²      Laura is here for her Subsequent Wellness visit. Last Medicare Wellness visit information reviewed, patient interviewed, no change since last AWV.     Health Risk Assessment:   Patient rates overall health as good. Patient feels that their physical health rating is slightly better. Patient is satisfied with their life. Eyesight was rated as slightly worse. Hearing was rated as same. Patient feels that their emotional and mental health rating is slightly better. Patients states they are never, rarely angry. Patient states they are sometimes unusually tired/fatigued. Pain experienced in the last 7 days has been some. Patient's pain rating has been 3/10. Patient states that she has experienced no weight loss or gain in last 6 months.     Depression Screening:   PHQ-2 Score: 0      Fall Risk Screening:   In the past year, patient has experienced: no history of falling in past year      Urinary Incontinence Screening:   Patient has not leaked urine accidently in the last six months.     Home Safety:  Patient does not have trouble with stairs inside or outside of their home. Patient has working smoke alarms and has working carbon monoxide detector. Home safety hazards include: none.     Nutrition:   Current diet is Regular.     Medications:   Patient is currently taking over-the-counter supplements. OTC medications include: see medication list. Patient is able to manage medications.     Activities of Daily Living (ADLs)/Instrumental Activities of Daily Living (IADLs):   Walk and transfer into and out of bed " and chair?: Yes  Dress and groom yourself?: Yes    Bathe or shower yourself?: Yes    Feed yourself? Yes  Do your laundry/housekeeping?: Yes  Manage your money, pay your bills and track your expenses?: Yes  Make your own meals?: Yes    Do your own shopping?: Yes    Previous Hospitalizations:   Any hospitalizations or ED visits within the last 12 months?: No      Advance Care Planning:   Living will: Yes    Durable POA for healthcare: Yes    Advanced directive: Yes    Advanced directive counseling given: No    Five wishes given: No    Patient declined ACP directive: No    End of Life Decisions reviewed with patient: Yes    Provider agrees with end of life decisions: Yes      PREVENTIVE SCREENINGS      Cardiovascular Screening:    General: Screening Not Indicated and History Lipid Disorder      Diabetes Screening:     General: Screening Current      Colorectal Cancer Screening:     General: Screening Current      Breast Cancer Screening:     General: Risks and Benefits Discussed    Due for: Mammogram        Cervical Cancer Screening:    General: Screening Not Indicated      Osteoporosis Screening:    General: Screening Current      Abdominal Aortic Aneurysm (AAA) Screening:        General: Screening Not Indicated      Lung Cancer Screening:     General: Screening Not Indicated      Hepatitis C Screening:    General: Screening Current    Screening, Brief Intervention, and Referral to Treatment (SBIRT)    Screening  Typical number of drinks in a day: 0  Typical number of drinks in a week: 0  Interpretation: Low risk drinking behavior.    AUDIT-C Screenin) How often did you have a drink containing alcohol in the past year? monthly or less  2) How many drinks did you have on a typical day when you were drinking in the past year? 1 to 2  3) How often did you have 6 or more drinks on one occasion in the past year? never    AUDIT-C Score: 1  Interpretation: Score 0-2 (female): Negative screen for alcohol misuse    Single  Item Drug Screening:  How often have you used an illegal drug (including marijuana) or a prescription medication for non-medical reasons in the past year? never    Single Item Drug Screen Score: 0  Interpretation: Negative screen for possible drug use disorder      Richi GuzmanDO    78-year-old female presents for subsequent annual wellness visit and follow-up of chronic conditions including hyperlipidemia vitamin D deficiency, hypertension and cervical spinal stenosis.  Patient overall has been feeling well.  Retired from working as a home health aide.  Still occasionally does get neck pain if she is doing yard work outside and she will take Lyrica which does provide her with good relief.  She had been referred back to pain management but had canceled that appointment because she thought it was related to Workmen's Comp. case that has been settled. Still would like to have evaluation as she had recevied epidural corticosteroid injections with releif in the past.  Labs reviewed with patient.  Cholesterol is very well-controlled.  Patient never completed bone density testing or screening mammogram        Review of Systems   Constitutional: Negative.    HENT: Negative.     Eyes: Negative.  Visual disturbance: Blind left eye.   Respiratory: Negative.     Cardiovascular: Negative.    Gastrointestinal: Negative.    Endocrine: Negative.    Genitourinary: Negative.    Musculoskeletal:  Positive for neck pain. Arthralgias: Right knee pain.  Skin: Negative.    Allergic/Immunologic: Negative.    Neurological: Negative.    Hematological: Negative.    Psychiatric/Behavioral: Negative.         Physical Exam  Vitals and nursing note reviewed.   Constitutional:       General: She is not in acute distress.     Appearance: Normal appearance. She is well-developed and normal weight.   HENT:      Head: Normocephalic and atraumatic.   Eyes:      Conjunctiva/sclera: Conjunctivae normal.      Pupils: Pupils are equal, round, and  reactive to light.   Cardiovascular:      Rate and Rhythm: Normal rate and regular rhythm.      Heart sounds: No murmur heard.  Pulmonary:      Effort: Pulmonary effort is normal. No respiratory distress.      Breath sounds: Normal breath sounds.   Abdominal:      General: Bowel sounds are normal.      Palpations: Abdomen is soft.      Tenderness: There is no abdominal tenderness.   Musculoskeletal:         General: No swelling.      Cervical back: Normal range of motion and neck supple.   Skin:     General: Skin is warm and dry.      Capillary Refill: Capillary refill takes less than 2 seconds.   Neurological:      Mental Status: She is alert and oriented to person, place, and time.      Deep Tendon Reflexes: Reflexes are normal and symmetric.      Comments:   Very mild ptosis of the left eyelid   Psychiatric:         Mood and Affect: Mood normal.         Behavior: Behavior normal.         Thought Content: Thought content normal.         Judgment: Judgment normal.

## 2024-06-04 NOTE — ASSESSMENT & PLAN NOTE
Hypertension remains very well-controlled on lisinopril/hydrochlorothiazide.  Continue same.  Refill provided.  Reevaluate in 6 months

## 2024-06-04 NOTE — PATIENT INSTRUCTIONS
Medicare Preventive Visit Patient Instructions  Thank you for completing your Welcome to Medicare Visit or Medicare Annual Wellness Visit today. Your next wellness visit will be due in one year (6/5/2025).  The screening/preventive services that you may require over the next 5-10 years are detailed below. Some tests may not apply to you based off risk factors and/or age. Screening tests ordered at today's visit but not completed yet may show as past due. Also, please note that scanned in results may not display below.  Preventive Screenings:  Service Recommendations Previous Testing/Comments   Colorectal Cancer Screening  * Colonoscopy    * Fecal Occult Blood Test (FOBT)/Fecal Immunochemical Test (FIT)  * Fecal DNA/Cologuard Test  * Flexible Sigmoidoscopy Age: 45-75 years old   Colonoscopy: every 10 years (may be performed more frequently if at higher risk)  OR  FOBT/FIT: every 1 year  OR  Cologuard: every 3 years  OR  Sigmoidoscopy: every 5 years  Screening may be recommended earlier than age 45 if at higher risk for colorectal cancer. Also, an individualized decision between you and your healthcare provider will decide whether screening between the ages of 76-85 would be appropriate. Colonoscopy: 06/21/2023  FOBT/FIT: Not on file  Cologuard: 01/19/2023  Sigmoidoscopy: Not on file    Screening Current     Breast Cancer Screening Age: 40+ years old  Frequency: every 1-2 years  Not required if history of left and right mastectomy Mammogram: Not on file    Risks and Benefits Discussed  Due for Mammogram   Cervical Cancer Screening Between the ages of 21-29, pap smear recommended once every 3 years.   Between the ages of 30-65, can perform pap smear with HPV co-testing every 5 years.   Recommendations may differ for women with a history of total hysterectomy, cervical cancer, or abnormal pap smears in past. Pap Smear: Not on file    Screening Not Indicated   Hepatitis C Screening Once for adults born between 1945 and  1965  More frequently in patients at high risk for Hepatitis C Hep C Antibody: Not on file    Screening Current   Diabetes Screening 1-2 times per year if you're at risk for diabetes or have pre-diabetes Fasting glucose: 90 mg/dL (5/20/2024)  A1C: No results in last 5 years (No results in last 5 years)  Screening Current   Cholesterol Screening Once every 5 years if you don't have a lipid disorder. May order more often based on risk factors. Lipid panel: 05/20/2024    Screening Not Indicated  History Lipid Disorder     Other Preventive Screenings Covered by Medicare:  Abdominal Aortic Aneurysm (AAA) Screening: covered once if your at risk. You're considered to be at risk if you have a family history of AAA.  Lung Cancer Screening: covers low dose CT scan once per year if you meet all of the following conditions: (1) Age 55-77; (2) No signs or symptoms of lung cancer; (3) Current smoker or have quit smoking within the last 15 years; (4) You have a tobacco smoking history of at least 20 pack years (packs per day multiplied by number of years you smoked); (5) You get a written order from a healthcare provider.  Glaucoma Screening: covered annually if you're considered high risk: (1) You have diabetes OR (2) Family history of glaucoma OR (3)  aged 50 and older OR (4)  American aged 65 and older  Osteoporosis Screening: covered every 2 years if you meet one of the following conditions: (1) You're estrogen deficient and at risk for osteoporosis based off medical history and other findings; (2) Have a vertebral abnormality; (3) On glucocorticoid therapy for more than 3 months; (4) Have primary hyperparathyroidism; (5) On osteoporosis medications and need to assess response to drug therapy.   Last bone density test (DXA Scan): Not on file.  HIV Screening: covered annually if you're between the age of 15-65. Also covered annually if you are younger than 15 and older than 65 with risk factors for HIV  infection. For pregnant patients, it is covered up to 3 times per pregnancy.    Immunizations:  Immunization Recommendations   Influenza Vaccine Annual influenza vaccination during flu season is recommended for all persons aged >= 6 months who do not have contraindications   Pneumococcal Vaccine   * Pneumococcal conjugate vaccine = PCV13 (Prevnar 13), PCV15 (Vaxneuvance), PCV20 (Prevnar 20)  * Pneumococcal polysaccharide vaccine = PPSV23 (Pneumovax) Adults 19-65 yo with certain risk factors or if 65+ yo  If never received any pneumonia vaccine: recommend Prevnar 20 (PCV20)  Give PCV20 if previously received 1 dose of PCV13 or PPSV23   Hepatitis B Vaccine 3 dose series if at intermediate or high risk (ex: diabetes, end stage renal disease, liver disease)   Respiratory syncytial virus (RSV) Vaccine - COVERED BY MEDICARE PART D  * RSVPreF3 (Arexvy) CDC recommends that adults 60 years of age and older may receive a single dose of RSV vaccine using shared clinical decision-making (SCDM)   Tetanus (Td) Vaccine - COST NOT COVERED BY MEDICARE PART B Following completion of primary series, a booster dose should be given every 10 years to maintain immunity against tetanus. Td may also be given as tetanus wound prophylaxis.   Tdap Vaccine - COST NOT COVERED BY MEDICARE PART B Recommended at least once for all adults. For pregnant patients, recommended with each pregnancy.   Shingles Vaccine (Shingrix) - COST NOT COVERED BY MEDICARE PART B  2 shot series recommended in those 19 years and older who have or will have weakened immune systems or those 50 years and older     Health Maintenance Due:      Topic Date Due   • Breast Cancer Screening: Mammogram  12/28/2024 (Originally 8/7/1985)   • Hepatitis C Screening  06/09/2084 (Originally 1945)   • Colorectal Cancer Screening  Discontinued     Immunizations Due:  There are no preventive care reminders to display for this patient.  Advance Directives   What are advance  directives?  Advance directives are legal documents that state your wishes and plans for medical care. These plans are made ahead of time in case you lose your ability to make decisions for yourself. Advance directives can apply to any medical decision, such as the treatments you want, and if you want to donate organs.   What are the types of advance directives?  There are many types of advance directives, and each state has rules about how to use them. You may choose a combination of any of the following:  Living will:  This is a written record of the treatment you want. You can also choose which treatments you do not want, which to limit, and which to stop at a certain time. This includes surgery, medicine, IV fluid, and tube feedings.   Durable power of  for healthcare (DPAHC):  This is a written record that states who you want to make healthcare choices for you when you are unable to make them for yourself. This person, called a proxy, is usually a family member or a friend. You may choose more than 1 proxy.  Do not resuscitate (DNR) order:  A DNR order is used in case your heart stops beating or you stop breathing. It is a request not to have certain forms of treatment, such as CPR. A DNR order may be included in other types of advance directives.  Medical directive:  This covers the care that you want if you are in a coma, near death, or unable to make decisions for yourself. You can list the treatments you want for each condition. Treatment may include pain medicine, surgery, blood transfusions, dialysis, IV or tube feedings, and a ventilator (breathing machine).  Values history:  This document has questions about your views, beliefs, and how you feel and think about life. This information can help others choose the care that you would choose.  Why are advance directives important?  An advance directive helps you control your care. Although spoken wishes may be used, it is better to have your wishes  written down. Spoken wishes can be misunderstood, or not followed. Treatments may be given even if you do not want them. An advance directive may make it easier for your family to make difficult choices about your care.   Weight Management   Why it is important to manage your weight:  Being overweight increases your risk of health conditions such as heart disease, high blood pressure, type 2 diabetes, and certain types of cancer. It can also increase your risk for osteoarthritis, sleep apnea, and other respiratory problems. Aim for a slow, steady weight loss. Even a small amount of weight loss can lower your risk of health problems.  How to lose weight safely:  A safe and healthy way to lose weight is to eat fewer calories and get regular exercise. You can lose up about 1 pound a week by decreasing the number of calories you eat by 500 calories each day.   Healthy meal plan for weight management:  A healthy meal plan includes a variety of foods, contains fewer calories, and helps you stay healthy. A healthy meal plan includes the following:  Eat whole-grain foods more often.  A healthy meal plan should contain fiber. Fiber is the part of grains, fruits, and vegetables that is not broken down by your body. Whole-grain foods are healthy and provide extra fiber in your diet. Some examples of whole-grain foods are whole-wheat breads and pastas, oatmeal, brown rice, and bulgur.  Eat a variety of vegetables every day.  Include dark, leafy greens such as spinach, kale, parveen greens, and mustard greens. Eat yellow and orange vegetables such as carrots, sweet potatoes, and winter squash.   Eat a variety of fruits every day.  Choose fresh or canned fruit (canned in its own juice or light syrup) instead of juice. Fruit juice has very little or no fiber.  Eat low-fat dairy foods.  Drink fat-free (skim) milk or 1% milk. Eat fat-free yogurt and low-fat cottage cheese. Try low-fat cheeses such as mozzarella and other reduced-fat  cheeses.  Choose meat and other protein foods that are low in fat.  Choose beans or other legumes such as split peas or lentils. Choose fish, skinless poultry (chicken or turkey), or lean cuts of red meat (beef or pork). Before you cook meat or poultry, cut off any visible fat.   Use less fat and oil.  Try baking foods instead of frying them. Add less fat, such as margarine, sour cream, regular salad dressing and mayonnaise to foods. Eat fewer high-fat foods. Some examples of high-fat foods include french fries, doughnuts, ice cream, and cakes.  Eat fewer sweets.  Limit foods and drinks that are high in sugar. This includes candy, cookies, regular soda, and sweetened drinks.  Exercise:  Exercise at least 30 minutes per day on most days of the week. Some examples of exercise include walking, biking, dancing, and swimming. You can also fit in more physical activity by taking the stairs instead of the elevator or parking farther away from stores. Ask your healthcare provider about the best exercise plan for you.      © Copyright [employer] 2018 Information is for End User's use only and may not be sold, redistributed or otherwise used for commercial purposes. All illustrations and images included in CareNotes® are the copyrighted property of [employer] Inc[employer] or [employer]

## 2024-06-05 ENCOUNTER — TELEPHONE (OUTPATIENT)
Dept: PAIN MEDICINE | Facility: CLINIC | Age: 79
End: 2024-06-05

## 2024-06-05 NOTE — TELEPHONE ENCOUNTER
----- Message from Peter Rivers MD sent at 6/5/2024  7:39 AM EDT -----  Regarding: FW: Cervical spinal stenosis follow-up  Please expedite an appt for her  ----- Message -----  From: Richi Guzman DO  Sent: 6/4/2024  10:07 AM EDT  To: Peter Rivers MD  Subject: Cervical spinal stenosis follow-up               Dr. Rivers,        My apologies, she was supposed to see either you or Bekah back in February/March But she had canceled those appointments thinking that it was related to her Workmen's Comp. case which have been settled.  It was not for that reason.  If she could be rescheduled it would be greatly appreciated.  Thank you    Sky

## 2024-06-24 ENCOUNTER — HOSPITAL ENCOUNTER (OUTPATIENT)
Dept: RADIOLOGY | Facility: HOSPITAL | Age: 79
Discharge: HOME | End: 2024-06-24
Payer: MEDICARE

## 2024-06-24 ENCOUNTER — OFFICE VISIT (OUTPATIENT)
Dept: PAIN MEDICINE | Facility: CLINIC | Age: 79
End: 2024-06-24
Payer: MEDICARE

## 2024-06-24 VITALS
SYSTOLIC BLOOD PRESSURE: 113 MMHG | RESPIRATION RATE: 17 BRPM | BODY MASS INDEX: 23.95 KG/M2 | HEART RATE: 70 BPM | DIASTOLIC BLOOD PRESSURE: 69 MMHG | WEIGHT: 149.03 LBS | HEIGHT: 66 IN

## 2024-06-24 DIAGNOSIS — G89.29 CHRONIC PAIN OF RIGHT KNEE: ICD-10-CM

## 2024-06-24 DIAGNOSIS — M25.561 CHRONIC PAIN OF RIGHT KNEE: ICD-10-CM

## 2024-06-24 DIAGNOSIS — G89.4 CHRONIC PAIN SYNDROME: ICD-10-CM

## 2024-06-24 DIAGNOSIS — M48.02 CERVICAL SPINAL STENOSIS: ICD-10-CM

## 2024-06-24 DIAGNOSIS — M48.062 SPINAL STENOSIS OF LUMBAR REGION WITH NEUROGENIC CLAUDICATION: ICD-10-CM

## 2024-06-24 DIAGNOSIS — M51.16 INTERVERTEBRAL DISC DISORDER WITH RADICULOPATHY OF LUMBAR REGION: Primary | ICD-10-CM

## 2024-06-24 PROCEDURE — 73562 X-RAY EXAM OF KNEE 3: CPT

## 2024-06-24 PROCEDURE — 99214 OFFICE O/P EST MOD 30 MIN: CPT

## 2024-06-24 NOTE — PROGRESS NOTES
Assessment:  1. Intervertebral disc disorder with radiculopathy of lumbar region    2. Spinal stenosis of lumbar region with neurogenic claudication    3. Chronic pain syndrome    4. Chronic pain of right knee    5. Cervical spinal stenosis        Plan:  The patient is a 78-year-old female with a history of chronic pain secondary to neck pain, cervical disc disorder with radiculopathy, cervical spinal stenosis, left shoulder pain and right ankle pain who presents to the office with a new pain complaint of worsening bilateral low back pain and right knee pain.    MRI of lumbar spine done on 2/16/2022 shows multilevel disc degeneration with central and neuroforaminal stenosis, severe at L4-L5.  I did instruct patient we can perform a lumbar epidural steroid injection to decrease inflammation and provide her relief.  Patient would like to proceed.  I instructed our  will schedule her.  Complete risks and benefits including bleeding, infection, tissue reaction, nerve injury and allergic reaction were discussed.  The approach was demonstrated using models and literature was provided.  Verbal and written consent was obtained.      I will also x-ray her right knee for further evaluation of her right knee pain.  I did instruct patient I will consult receive the results.    My impressions and treatment recommendations were discussed in detail with the patient who verbalized understanding and had no further questions.  Discharge instructions were provided. I personally saw and examined the patient and I agree with the above discussed plan of care.    Orders Placed This Encounter   Procedures    FL spine and pain procedure     Dr Rivers     Standing Status:   Future     Standing Expiration Date:   6/24/2028     Order Specific Question:   Reason for Exam:     Answer:   Bilateral L4-5 TFESI     Order Specific Question:   Anticoagulant hold needed?     Answer:   No    XR knee 3 vw right non injury     Standing Status:    Future     Number of Occurrences:   1     Standing Expiration Date:   6/24/2028     Scheduling Instructions:      Bring along any outside films relating to this procedure.           No orders of the defined types were placed in this encounter.      History of Present Illness:  Laura Granados is a 78 y.o. female with a history of chronic pain secondary to neck pain, cervical disc disorder with radiculopathy, cervical spinal stenosis, left shoulder pain and right ankle pain.  She was last seen on 5/23/2022 where she was started on methocarbamol to help with her muscle spasm pain.  She presents to the office with a new pain complaint of worsening bilateral low back pain and right knee pain.    She states her pain is worse since the last office visit and constant.  She rates quality of her pain is dull/aching, sharp and is currently rating her pain a 5-10/10 scale.    Current pain medications include ibuprofen and Tylenol as needed which is helpful.  She is also taking Lyrica 25 mg as prescribed by her PCP.    I have personally reviewed and/or updated the patient's past medical history, past surgical history, family history, social history, current medications, allergies, and vital signs today.     Review of Systems   Respiratory:  Negative for shortness of breath.    Cardiovascular:  Negative for chest pain.   Gastrointestinal:  Positive for nausea. Negative for constipation and vomiting.   Musculoskeletal:  Positive for back pain, gait problem and joint swelling. Negative for arthralgias and myalgias.        Bilateral Knee Stiffness  Bilateral foot swelling   Skin:  Negative for rash.   Neurological:  Positive for dizziness and weakness. Negative for seizures.   Psychiatric/Behavioral:  Positive for decreased concentration.    All other systems reviewed and are negative.      Patient Active Problem List   Diagnosis    Benign essential hypertension    Benign neoplasm of bone    Disc degeneration, lumbar    Facial  nerve disorder    Generalized osteoarthritis    Mixed hyperlipidemia    Symptomatic menopausal or female climacteric states    Neuropathy of right foot    Polyp of sigmoid colon    Primary osteoarthritis of right knee    Vitamin D deficiency    Flu vaccine need    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    Exposure to COVID-19 virus    Encounter for screening mammogram for breast cancer    Cervical disc disorder with radiculopathy of mid-cervical region    Cervical spinal stenosis    Postmenopausal       Past Medical History:   Diagnosis Date    Arthritis     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, unspecified     Last assessed: 10/17/13    Colon polyps     Depression     Hyperlipidemia     Hypertension     Lumbago     last assessed: 1/15/14    Menopause     Neuropathy     Ptosis, left eyelid     last assessed: 10/16/15    Shortness of breath     Vertigo     last assessed: 10/26/15       Past Surgical History:   Procedure Laterality Date    BLADDER SURGERY      HYSTERECTOMY      VA COLONOSCOPY FLX DX W/COLLJ SPEC WHEN PFRMD N/A 4/11/2016    Procedure: COLONOSCOPY;  Surgeon: Anna Valencia MD;  Location: [facility];  Service: Gastroenterology       Family History   Problem Relation Age of Onset    Heart disease Other         cardiac disorder       Social History     Occupational History    Not on file   Tobacco Use    Smoking status: Never    Smokeless tobacco: Never   Vaping Use    Vaping status: Never Used   Substance and Sexual Activity    Alcohol use: Yes     Comment: social, rarely    Drug use: No    Sexual activity: Not on file       Current Outpatient Medications on File Prior to Visit   Medication Sig    acetaminophen (TYLENOL) 500 mg tablet Take 500 mg by mouth every 6 (six) hours as needed for mild pain.    Cholecalciferol (VITAMIN D3) 5000 units CAPS Take 2,000 Units by mouth every other day      ibuprofen (MOTRIN) 600 mg tablet Take 1 tablet (600 mg total) by mouth 3 (three) times a day as  "needed for mild pain    lisinopril-hydrochlorothiazide (PRINZIDE,ZESTORETIC) 20-25 MG per tablet Take 1 tablet by mouth daily    pravastatin (PRAVACHOL) 20 mg tablet Take 1 tablet (20 mg total) by mouth daily at bedtime    pregabalin (LYRICA) 25 mg capsule Take 1 capsule (25 mg total) by mouth 3 (three) times a day     No current facility-administered medications on file prior to visit.       Allergies   Allergen Reactions    Benzoyl Peroxide Rash       Physical Exam:    /69   Pulse 70   Resp 17   Ht 5' 6\" (1.676 m)   Wt 67.6 kg (149 lb 0.5 oz)   BMI 24.05 kg/m²     Constitutional:normal, well developed, well nourished, alert, in no distress and non-toxic and no overt pain behavior.  Eyes:anicteric  HEENT:grossly intact  Neck:supple, symmetric, trachea midline and no masses   Pulmonary:even and unlabored  Cardiovascular:No edema or pitting edema present  Skin:Normal without rashes or lesions and well hydrated  Psychiatric:Mood and affect appropriate  Neurologic:Cranial Nerves II-XII grossly intact  Musculoskeletal:antalgic    Lumbar Spine Exam    Appearance:  Normal lordosis  Palpation/Tenderness:  left lumbar paraspinal tenderness  right lumbar paraspinal tenderness  Sensory:  no sensory deficits noted  Range of Motion:  Flexion:  Minimally limited  with pain  Extension:  Minimally limited  with pain  Motor Strength:  Left hip flexion:  5/5  Right hip flexion:  5/5  Left knee flexion:  5/5  Left knee extension:  5/5  Right knee flexion:  5/5  Right knee extension:  5/5  Left foot dorsiflexion:  5/5  Left foot plantar flexion:  5/5  Right foot dorsiflexion:  5/5  Right foot plantar flexion:  5/5        Imaging    "

## 2024-07-26 ENCOUNTER — HOSPITAL ENCOUNTER (OUTPATIENT)
Dept: RADIOLOGY | Facility: CLINIC | Age: 79
Discharge: HOME | End: 2024-07-26

## 2024-10-01 ENCOUNTER — HOSPITAL ENCOUNTER (OUTPATIENT)
Dept: RADIOLOGY | Age: 79
Discharge: HOME | End: 2024-10-01
Payer: MEDICARE

## 2024-10-01 VITALS — HEIGHT: 65 IN | WEIGHT: 151 LBS | BODY MASS INDEX: 25.16 KG/M2

## 2024-10-01 DIAGNOSIS — Z12.31 ENCOUNTER FOR SCREENING MAMMOGRAM FOR BREAST CANCER: ICD-10-CM

## 2024-10-01 DIAGNOSIS — Z78.0 POSTMENOPAUSAL: ICD-10-CM

## 2024-10-01 DIAGNOSIS — N95.1 SYMPTOMATIC MENOPAUSAL OR FEMALE CLIMACTERIC STATES: ICD-10-CM

## 2024-10-01 PROCEDURE — 77067 SCR MAMMO BI INCL CAD: CPT

## 2024-10-01 PROCEDURE — 77080 DXA BONE DENSITY AXIAL: CPT

## 2024-10-01 PROCEDURE — 77063 BREAST TOMOSYNTHESIS BI: CPT

## 2024-10-03 ENCOUNTER — TELEPHONE (OUTPATIENT)
Dept: FAMILY MEDICINE CLINIC | Facility: CLINIC | Age: 79
End: 2024-10-03

## 2024-10-03 NOTE — RESULT ENCOUNTER NOTE
Please call patient and notify that results of her DEXA scan are normal.  No evidence of osteoporosis.  Actually has quite strong bones.  That is probably why she can fall down a flight of stairs with a client, fall into a closet door and not break anything

## 2024-10-03 NOTE — TELEPHONE ENCOUNTER
----- Message from Richi Guzman DO sent at 10/3/2024  8:53 AM EDT -----  Please call patient and notify that results of her DEXA scan are normal.  No evidence of osteoporosis.  Actually has quite strong bones.  That is probably why she can fall down a flight of stairs with a client, fall into a closet door and not break anything  
Pt informed  
Detail Level: Zone
Detail Level: Generalized

## 2024-12-02 ENCOUNTER — APPOINTMENT (OUTPATIENT)
Dept: LAB | Facility: CLINIC | Age: 79
End: 2024-12-02
Payer: MEDICARE

## 2024-12-02 ENCOUNTER — RESULTS FOLLOW-UP (OUTPATIENT)
Dept: FAMILY MEDICINE CLINIC | Facility: CLINIC | Age: 79
End: 2024-12-02

## 2024-12-02 DIAGNOSIS — E55.9 VITAMIN D DEFICIENCY: ICD-10-CM

## 2024-12-02 DIAGNOSIS — I10 ESSENTIAL HYPERTENSION: ICD-10-CM

## 2024-12-02 DIAGNOSIS — E78.2 MIXED HYPERLIPIDEMIA: ICD-10-CM

## 2024-12-02 LAB
ALBUMIN SERPL BCG-MCNC: 4.5 G/DL (ref 3.5–5)
ALP SERPL-CCNC: 44 U/L (ref 34–104)
ALT SERPL W P-5'-P-CCNC: 8 U/L (ref 7–52)
ANION GAP SERPL CALCULATED.3IONS-SCNC: 6 MMOL/L (ref 4–13)
AST SERPL W P-5'-P-CCNC: 16 U/L (ref 13–39)
BASOPHILS # BLD AUTO: 0.04 THOUSANDS/ΜL (ref 0–0.1)
BASOPHILS NFR BLD AUTO: 1 % (ref 0–1)
BILIRUB SERPL-MCNC: 0.53 MG/DL (ref 0.2–1)
BUN SERPL-MCNC: 19 MG/DL (ref 5–25)
CALCIUM SERPL-MCNC: 9.7 MG/DL (ref 8.4–10.2)
CHLORIDE SERPL-SCNC: 98 MMOL/L (ref 96–108)
CHOLEST SERPL-MCNC: 208 MG/DL (ref ?–200)
CO2 SERPL-SCNC: 32 MMOL/L (ref 21–32)
CREAT SERPL-MCNC: 0.95 MG/DL (ref 0.6–1.3)
EOSINOPHIL # BLD AUTO: 0.09 THOUSAND/ΜL (ref 0–0.61)
EOSINOPHIL NFR BLD AUTO: 1 % (ref 0–6)
ERYTHROCYTE [DISTWIDTH] IN BLOOD BY AUTOMATED COUNT: 12.1 % (ref 11.6–15.1)
GFR SERPL CREATININE-BSD FRML MDRD: 57 ML/MIN/1.73SQ M
GLUCOSE P FAST SERPL-MCNC: 89 MG/DL (ref 65–99)
HCT VFR BLD AUTO: 38.8 % (ref 34.8–46.1)
HDLC SERPL-MCNC: 66 MG/DL
HGB BLD-MCNC: 12.9 G/DL (ref 11.5–15.4)
IMM GRANULOCYTES # BLD AUTO: 0.02 THOUSAND/UL (ref 0–0.2)
IMM GRANULOCYTES NFR BLD AUTO: 0 % (ref 0–2)
LDLC SERPL CALC-MCNC: 122 MG/DL (ref 0–100)
LYMPHOCYTES # BLD AUTO: 2.77 THOUSANDS/ΜL (ref 0.6–4.47)
LYMPHOCYTES NFR BLD AUTO: 41 % (ref 14–44)
MCH RBC QN AUTO: 32.2 PG (ref 26.8–34.3)
MCHC RBC AUTO-ENTMCNC: 33.2 G/DL (ref 31.4–37.4)
MCV RBC AUTO: 97 FL (ref 82–98)
MONOCYTES # BLD AUTO: 0.55 THOUSAND/ΜL (ref 0.17–1.22)
MONOCYTES NFR BLD AUTO: 8 % (ref 4–12)
NEUTROPHILS # BLD AUTO: 3.23 THOUSANDS/ΜL (ref 1.85–7.62)
NEUTS SEG NFR BLD AUTO: 49 % (ref 43–75)
NONHDLC SERPL-MCNC: 142 MG/DL
NRBC BLD AUTO-RTO: 0 /100 WBCS
PLATELET # BLD AUTO: 277 THOUSANDS/UL (ref 149–390)
PMV BLD AUTO: 10 FL (ref 8.9–12.7)
POTASSIUM SERPL-SCNC: 4.1 MMOL/L (ref 3.5–5.3)
PROT SERPL-MCNC: 7.3 G/DL (ref 6.4–8.4)
RBC # BLD AUTO: 4.01 MILLION/UL (ref 3.81–5.12)
SODIUM SERPL-SCNC: 136 MMOL/L (ref 135–147)
TRIGL SERPL-MCNC: 100 MG/DL (ref ?–150)
TSH SERPL DL<=0.05 MIU/L-ACNC: 1.45 UIU/ML (ref 0.45–4.5)
WBC # BLD AUTO: 6.7 THOUSAND/UL (ref 4.31–10.16)

## 2024-12-02 PROCEDURE — 80053 COMPREHEN METABOLIC PANEL: CPT

## 2024-12-02 PROCEDURE — 36415 COLL VENOUS BLD VENIPUNCTURE: CPT

## 2024-12-02 PROCEDURE — 85025 COMPLETE CBC W/AUTO DIFF WBC: CPT

## 2024-12-02 PROCEDURE — 82306 VITAMIN D 25 HYDROXY: CPT

## 2024-12-02 PROCEDURE — 80061 LIPID PANEL: CPT

## 2024-12-02 PROCEDURE — 84443 ASSAY THYROID STIM HORMONE: CPT

## 2024-12-05 LAB — 25(OH)D3 SERPL-MCNC: 44.4 NG/ML (ref 30–100)

## 2024-12-09 ENCOUNTER — TELEPHONE (OUTPATIENT)
Dept: PAIN MEDICINE | Facility: CLINIC | Age: 79
End: 2024-12-09

## 2024-12-09 ENCOUNTER — OFFICE VISIT (OUTPATIENT)
Dept: FAMILY MEDICINE CLINIC | Facility: CLINIC | Age: 79
End: 2024-12-09
Payer: MEDICARE

## 2024-12-09 VITALS
BODY MASS INDEX: 25.16 KG/M2 | DIASTOLIC BLOOD PRESSURE: 64 MMHG | RESPIRATION RATE: 16 BRPM | HEART RATE: 70 BPM | OXYGEN SATURATION: 98 % | SYSTOLIC BLOOD PRESSURE: 128 MMHG | WEIGHT: 151 LBS | HEIGHT: 65 IN

## 2024-12-09 DIAGNOSIS — I10 BENIGN ESSENTIAL HYPERTENSION: ICD-10-CM

## 2024-12-09 DIAGNOSIS — I10 ESSENTIAL HYPERTENSION: ICD-10-CM

## 2024-12-09 DIAGNOSIS — E78.2 MIXED HYPERLIPIDEMIA: Primary | ICD-10-CM

## 2024-12-09 DIAGNOSIS — M48.02 CERVICAL SPINAL STENOSIS: ICD-10-CM

## 2024-12-09 DIAGNOSIS — E55.9 VITAMIN D DEFICIENCY: ICD-10-CM

## 2024-12-09 DIAGNOSIS — M51.369 DEGENERATION OF INTERVERTEBRAL DISC OF LUMBAR REGION, UNSPECIFIED WHETHER PAIN PRESENT: ICD-10-CM

## 2024-12-09 DIAGNOSIS — G57.91 NEUROPATHY OF RIGHT FOOT: ICD-10-CM

## 2024-12-09 DIAGNOSIS — Z23 FLU VACCINE NEED: ICD-10-CM

## 2024-12-09 PROCEDURE — 90662 IIV NO PRSV INCREASED AG IM: CPT | Performed by: FAMILY MEDICINE

## 2024-12-09 PROCEDURE — 99214 OFFICE O/P EST MOD 30 MIN: CPT | Performed by: FAMILY MEDICINE

## 2024-12-09 PROCEDURE — G0008 ADMIN INFLUENZA VIRUS VAC: HCPCS | Performed by: FAMILY MEDICINE

## 2024-12-09 RX ORDER — LISINOPRIL AND HYDROCHLOROTHIAZIDE 20; 25 MG/1; MG/1
1 TABLET ORAL DAILY
Qty: 90 TABLET | Refills: 1 | Status: SHIPPED | OUTPATIENT
Start: 2024-12-09

## 2024-12-09 RX ORDER — PREGABALIN 25 MG/1
25 CAPSULE ORAL 2 TIMES DAILY
Qty: 60 CAPSULE | Refills: 1 | Status: SHIPPED | OUTPATIENT
Start: 2024-12-09

## 2024-12-09 RX ORDER — PRAVASTATIN SODIUM 20 MG
20 TABLET ORAL
Qty: 90 TABLET | Refills: 1 | Status: SHIPPED | OUTPATIENT
Start: 2024-12-09

## 2024-12-09 NOTE — ASSESSMENT & PLAN NOTE
Longstanding history of DJD of the lumbar spine.  She does have L4-L5 facet arthrosis that was noted on MRI of the lumbar spine back in 2022.  I greatly appreciate pain management evaluation.  I advised the patient that she should have epidural corticosteroid injection performed.  Sent message to nurse practitioner about getting her scheduled once again

## 2024-12-09 NOTE — PROGRESS NOTES
Subjective:      Patient ID: Laura Granados is a 79 y.o. female.    79-year-old female presents for follow-up of chronic conditions including hyperlipidemia vitamin D deficiency, hypertension, DJD lumbar spine, neuropathy of feet and cervical spinal stenosis.  Patient overall has been feeling well.  Retired and not doing much to occupy her time.  Continues to complain of paresthesias in both feet as well as lower back pain.  She was referred back to pain management after I had seen her in June.  She was seen by nurse practitioner and was scheduled for epidural corticosteroid injection.  That was supposed to be done in July and in the chart it states that the patient canceled that appointment.  She also states that she does not recall canceling the appointment.  She was supposed to be taking Lyrica which I did send a refill for in June.  She never picked that up.  I checked the [place] drug monitoring program and the last time she picked up Lyrica was December of last year.  She still has a bottle of medication with Dr. Rivers's name on it which is gabapentin from 2022.  She does not like the way that makes her feel.  She cannot recall if she ever tried taking the Lyrica.  She is current with screening mammogram as well as DEXA scan.  She did have blood work performed.  Cholesterol remains adequately controlled on pravastatin.  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, unspecified     Last assessed: 10/17/13    Colon polyps     Depression     Hyperlipidemia     Hypertension     Lumbago     last assessed: 1/15/14    Menopause     Neuropathy     Ptosis, left eyelid     last assessed: 10/16/15    Shortness of breath     Vertigo     last assessed: 10/26/15       Family History   Problem Relation Age of Onset    No Known Problems Mother     Throat cancer Father     Lung cancer Father     No Known Problems  Sister     No Known Problems Daughter     No Known Problems Daughter     No Known Problems Daughter     No Known Problems Daughter     No Known Problems Maternal Grandmother     No Known Problems Maternal Grandfather     No Known Problems Paternal Grandmother     Cancer Paternal Grandfather         behind the eye    Heart disease Other         cardiac disorder    No Known Problems Brother     No Known Problems Maternal Aunt     No Known Problems Maternal Uncle     No Known Problems Paternal Aunt     No Known Problems Paternal Aunt     No Known Problems Paternal Aunt     No Known Problems Paternal Aunt     Breast cancer Paternal Cousin         unsure of age       Past Surgical History:   Procedure Laterality Date    BLADDER SURGERY      HYSTERECTOMY      still has ovaries; about age 66    VT COLONOSCOPY FLX DX W/COLLJ SPEC WHEN PFRMD N/A 04/11/2016    Procedure: COLONOSCOPY;  Surgeon: Anna Valencia MD;  Location: [facility];  Service: Gastroenterology        reports that she has never smoked. She has never used smokeless tobacco. She reports current alcohol use. She reports that she does not use drugs.      Current Outpatient Medications:     acetaminophen (TYLENOL) 500 mg tablet, Take 500 mg by mouth every 6 (six) hours as needed for mild pain., Disp: , Rfl:     Cholecalciferol (VITAMIN D3) 5000 units CAPS, Take 2,000 Units by mouth every other day  , Disp: , Rfl:     ibuprofen (MOTRIN) 600 mg tablet, Take 1 tablet (600 mg total) by mouth 3 (three) times a day as needed for mild pain, Disp: 90 tablet, Rfl: 2    lisinopril-hydrochlorothiazide (PRINZIDE,ZESTORETIC) 20-25 MG per tablet, Take 1 tablet by mouth daily, Disp: 90 tablet, Rfl: 1    pravastatin (PRAVACHOL) 20 mg tablet, Take 1 tablet (20 mg total) by mouth daily at bedtime, Disp: 90 tablet, Rfl: 1    pregabalin (LYRICA) 25 mg capsule, Take 1 capsule (25 mg total) by mouth 2 (two) times a day, Disp: 60 capsule, Rfl: 1    The following portions of the patient's  "history were reviewed and updated as appropriate: allergies, current medications, past family history, past medical history, past social history, past surgical history and problem list.    Review of Systems   Constitutional: Negative.    HENT: Negative.     Eyes: Negative.  Visual disturbance: Blind left eye.        Blind left eye   Respiratory: Negative.     Cardiovascular: Negative.  Negative for leg swelling.   Gastrointestinal: Negative.    Endocrine: Negative.    Genitourinary: Negative.    Musculoskeletal:  Positive for arthralgias, back pain and neck pain. Negative for joint swelling.   Skin: Negative.    Allergic/Immunologic: Negative.    Neurological:  Positive for weakness and numbness (Bilateral feet). Negative for dizziness, syncope and light-headedness.   Hematological: Negative.    Psychiatric/Behavioral: Negative.             Objective:    /64   Pulse 70   Resp 16   Ht 5' 5\" (1.651 m)   Wt 68.5 kg (151 lb)   SpO2 98%   BMI 25.13 kg/m²      Physical Exam  Vitals and nursing note reviewed.   Constitutional:       General: She is not in acute distress.     Appearance: Normal appearance. She is well-developed and normal weight. She is not diaphoretic.   HENT:      Head: Normocephalic and atraumatic.      Right Ear: Tympanic membrane, ear canal and external ear normal.      Left Ear: Tympanic membrane, ear canal and external ear normal.   Eyes:      Extraocular Movements: Extraocular movements intact.      Conjunctiva/sclera: Conjunctivae normal.      Pupils: Pupils are equal, round, and reactive to light.      Comments: Ptosis of left upper eyelid, legally blind left eye   Cardiovascular:      Rate and Rhythm: Normal rate and regular rhythm.      Pulses: Normal pulses.      Heart sounds: Normal heart sounds. No murmur heard.  Pulmonary:      Effort: Pulmonary effort is normal.      Breath sounds: Normal breath sounds.   Abdominal:      General: Abdomen is flat. Bowel sounds are normal.      " Palpations: Abdomen is soft.   Musculoskeletal:         General: Normal range of motion.      Cervical back: Normal range of motion and neck supple.   Skin:     General: Skin is warm and dry.      Findings: No rash.   Neurological:      General: No focal deficit present.      Mental Status: She is alert and oriented to person, place, and time. Mental status is at baseline.      Sensory: Sensory deficit (Bilateral feet) present.      Motor: No weakness.      Gait: Gait normal.      Deep Tendon Reflexes: Reflexes are normal and symmetric.   Psychiatric:         Mood and Affect: Mood normal.         Behavior: Behavior normal.         Thought Content: Thought content normal.         Judgment: Judgment normal.           Recent Results (from the past 6 weeks)   CBC and differential    Collection Time: 12/02/24 12:00 PM   Result Value Ref Range    WBC 6.70 4.31 - 10.16 Thousand/uL    RBC 4.01 3.81 - 5.12 Million/uL    Hemoglobin 12.9 11.5 - 15.4 g/dL    Hematocrit 38.8 34.8 - 46.1 %    MCV 97 82 - 98 fL    MCH 32.2 26.8 - 34.3 pg    MCHC 33.2 31.4 - 37.4 g/dL    RDW 12.1 11.6 - 15.1 %    MPV 10.0 8.9 - 12.7 fL    Platelets 277 149 - 390 Thousands/uL    nRBC 0 /100 WBCs    Segmented % 49 43 - 75 %    Immature Grans % 0 0 - 2 %    Lymphocytes % 41 14 - 44 %    Monocytes % 8 4 - 12 %    Eosinophils Relative 1 0 - 6 %    Basophils Relative 1 0 - 1 %    Absolute Neutrophils 3.23 1.85 - 7.62 Thousands/µL    Absolute Immature Grans 0.02 0.00 - 0.20 Thousand/uL    Absolute Lymphocytes 2.77 0.60 - 4.47 Thousands/µL    Absolute Monocytes 0.55 0.17 - 1.22 Thousand/µL    Eosinophils Absolute 0.09 0.00 - 0.61 Thousand/µL    Basophils Absolute 0.04 0.00 - 0.10 Thousands/µL   Comprehensive metabolic panel    Collection Time: 12/02/24 12:00 PM   Result Value Ref Range    Sodium 136 135 - 147 mmol/L    Potassium 4.1 3.5 - 5.3 mmol/L    Chloride 98 96 - 108 mmol/L    CO2 32 21 - 32 mmol/L    ANION GAP 6 4 - 13 mmol/L    BUN 19 5 - 25 mg/dL     Creatinine 0.95 0.60 - 1.30 mg/dL    Glucose, Fasting 89 65 - 99 mg/dL    Calcium 9.7 8.4 - 10.2 mg/dL    AST 16 13 - 39 U/L    ALT 8 7 - 52 U/L    Alkaline Phosphatase 44 34 - 104 U/L    Total Protein 7.3 6.4 - 8.4 g/dL    Albumin 4.5 3.5 - 5.0 g/dL    Total Bilirubin 0.53 0.20 - 1.00 mg/dL    eGFR 57 ml/min/1.73sq m   Lipid panel    Collection Time: 12/02/24 12:00 PM   Result Value Ref Range    Cholesterol 208 (H) See Comment mg/dL    Triglycerides 100 See Comment mg/dL    HDL, Direct 66 >=50 mg/dL    LDL Calculated 122 (H) 0 - 100 mg/dL    Non-HDL-Chol (CHOL-HDL) 142 mg/dl   TSH, 3rd generation with Free T4 reflex    Collection Time: 12/02/24 12:00 PM   Result Value Ref Range    TSH 3RD GENERATON 1.446 0.450 - 4.500 uIU/mL   Vitamin D 25 hydroxy    Collection Time: 12/02/24 12:00 PM   Result Value Ref Range    Vit D, 25-Hydroxy 44.4 30.0 - 100.0 ng/mL       Assessment/Plan:    Benign essential hypertension  Hypertension remains very well-controlled on lisinopril/hydrochlorothiazide.  Continue same.  Refill provided.  Reevaluate in 6 months    Neuropathy of right foot  Neuropathy of both feet is likely the result of DJD of the lumbar spine.  She did not like the way gabapentin made her feel.  We have documented that previously.  I did recommend that she try taking Lyrica.  Recent prescription to [place] [facility]    Prior to prescribing the controlled substance, a patient search was performed on the [place] prescription drug monitoring program web site.  There was no evidence of diversion or misuse.  Prescription provided      Disc degeneration, lumbar  Longstanding history of DJD of the lumbar spine.  She does have L4-L5 facet arthrosis that was noted on MRI of the lumbar spine back in 2022.  I greatly appreciate pain management evaluation.  I advised the patient that she should have epidural corticosteroid injection performed.  Sent message to nurse practitioner about getting her scheduled once  again    Vitamin D deficiency  Therapeutic vitamin D level on supplementation.  Repeat vitamin D level to be done in 6 months    Mixed hyperlipidemia  Cholesterol remains adequately controlled on pravastatin 20 mg once daily.  Continue same.  Repeat lipid panel in 6 months          Problem List Items Addressed This Visit          Cardiovascular and Mediastinum    Benign essential hypertension    Hypertension remains very well-controlled on lisinopril/hydrochlorothiazide.  Continue same.  Refill provided.  Reevaluate in 6 months         Relevant Medications    lisinopril-hydrochlorothiazide (PRINZIDE,ZESTORETIC) 20-25 MG per tablet    Other Relevant Orders    CBC and differential    TSH, 3rd generation with Free T4 reflex       Nervous and Auditory    Neuropathy of right foot    Neuropathy of both feet is likely the result of DJD of the lumbar spine.  She did not like the way gabapentin made her feel.  We have documented that previously.  I did recommend that she try taking Lyrica.  Recent prescription to [place] pharmacy    Prior to prescribing the controlled substance, a patient search was performed on the [place] prescription drug monitoring program web site.  There was no evidence of diversion or misuse.  Prescription provided              Musculoskeletal and Integument    Disc degeneration, lumbar    Longstanding history of DJD of the lumbar spine.  She does have L4-L5 facet arthrosis that was noted on MRI of the lumbar spine back in 2022.  I greatly appreciate pain management evaluation.  I advised the patient that she should have epidural corticosteroid injection performed.  Sent message to nurse practitioner about getting her scheduled once again            Orthopedic/Musculoskeletal    Cervical spinal stenosis    Relevant Medications    pregabalin (LYRICA) 25 mg capsule       Other    Flu vaccine need    Relevant Orders    influenza vaccine, high-dose, PF 0.5 mL (Fluzone High Dose) (Completed)    Mixed  hyperlipidemia - Primary    Cholesterol remains adequately controlled on pravastatin 20 mg once daily.  Continue same.  Repeat lipid panel in 6 months         Relevant Medications    pravastatin (PRAVACHOL) 20 mg tablet    Other Relevant Orders    Comprehensive metabolic panel    Lipid panel    Vitamin D deficiency    Therapeutic vitamin D level on supplementation.  Repeat vitamin D level to be done in 6 months         Relevant Orders    Vitamin D 25 hydroxy     Other Visit Diagnoses         Essential hypertension        Relevant Medications    lisinopril-hydrochlorothiazide (PRINZIDE,ZESTORETIC) 20-25 MG per tablet    Other Relevant Orders    CBC and differential    TSH, 3rd generation with Free T4 reflex

## 2024-12-09 NOTE — ASSESSMENT & PLAN NOTE
Cholesterol remains adequately controlled on pravastatin 20 mg once daily.  Continue same.  Repeat lipid panel in 6 months

## 2024-12-09 NOTE — TELEPHONE ENCOUNTER
----- Message from CELIA Weller sent at 12/9/2024 12:26 PM EST -----  Regarding: RE: Epidural steroid  Hi Dr Guzman,  I was reviewing her chart and I cannot see any reason why that procedure was cancelled. We can absolutely reschedule her.  Kaden - Please call patient and reschedule for epidural. Thank you!    Bekah  ----- Message -----  From: Richi Guzman DO  Sent: 12/9/2024  10:36 AM EST  To: CELIA Oquendo  Subject: Epidural steroid                                 Bekah,    You saw her in June and set up for an epidural with Dr. Rivers in July.  Says that patient canceled but she does not recall doing that.  Can she be scheduled once again?  Thanks    Dr. Guzman

## 2024-12-09 NOTE — ASSESSMENT & PLAN NOTE
Neuropathy of both feet is likely the result of DJD of the lumbar spine.  She did not like the way gabapentin made her feel.  We have documented that previously.  I did recommend that she try taking Lyrica.  Recent prescription to [place] pharmacy    Prior to prescribing the controlled substance, a patient search was performed on the [place] prescription drug monitoring program web site.  There was no evidence of diversion or misuse.  Prescription provided

## 2025-06-09 ENCOUNTER — RESULTS FOLLOW-UP (OUTPATIENT)
Dept: FAMILY MEDICINE CLINIC | Facility: CLINIC | Age: 80
End: 2025-06-09

## 2025-06-09 ENCOUNTER — APPOINTMENT (OUTPATIENT)
Dept: LAB | Facility: CLINIC | Age: 80
End: 2025-06-09
Attending: FAMILY MEDICINE
Payer: MEDICARE

## 2025-06-09 DIAGNOSIS — E78.2 MIXED HYPERLIPIDEMIA: ICD-10-CM

## 2025-06-09 DIAGNOSIS — E55.9 VITAMIN D DEFICIENCY: ICD-10-CM

## 2025-06-09 DIAGNOSIS — I10 ESSENTIAL HYPERTENSION: ICD-10-CM

## 2025-06-09 DIAGNOSIS — I10 BENIGN ESSENTIAL HYPERTENSION: ICD-10-CM

## 2025-06-09 LAB
25(OH)D3 SERPL-MCNC: 35.9 NG/ML (ref 30–100)
ALBUMIN SERPL BCG-MCNC: 4.5 G/DL (ref 3.5–5)
ALP SERPL-CCNC: 47 U/L (ref 34–104)
ALT SERPL W P-5'-P-CCNC: 8 U/L (ref 7–52)
ANION GAP SERPL CALCULATED.3IONS-SCNC: 6 MMOL/L (ref 4–13)
AST SERPL W P-5'-P-CCNC: 15 U/L (ref 13–39)
BASOPHILS # BLD AUTO: 0.07 THOUSANDS/ÂΜL (ref 0–0.1)
BASOPHILS NFR BLD AUTO: 1 % (ref 0–1)
BILIRUB SERPL-MCNC: 0.5 MG/DL (ref 0.2–1)
BUN SERPL-MCNC: 15 MG/DL (ref 5–25)
CALCIUM SERPL-MCNC: 9.8 MG/DL (ref 8.4–10.2)
CHLORIDE SERPL-SCNC: 100 MMOL/L (ref 96–108)
CHOLEST SERPL-MCNC: 241 MG/DL (ref ?–200)
CO2 SERPL-SCNC: 32 MMOL/L (ref 21–32)
CREAT SERPL-MCNC: 0.89 MG/DL (ref 0.6–1.3)
EOSINOPHIL # BLD AUTO: 0.1 THOUSAND/ÂΜL (ref 0–0.61)
EOSINOPHIL NFR BLD AUTO: 1 % (ref 0–6)
ERYTHROCYTE [DISTWIDTH] IN BLOOD BY AUTOMATED COUNT: 12.6 % (ref 11.6–15.1)
GFR SERPL CREATININE-BSD FRML MDRD: 61 ML/MIN/1.73SQ M
GLUCOSE P FAST SERPL-MCNC: 97 MG/DL (ref 65–99)
HCT VFR BLD AUTO: 39.3 % (ref 34.8–46.1)
HDLC SERPL-MCNC: 77 MG/DL
HGB BLD-MCNC: 13.2 G/DL (ref 11.5–15.4)
IMM GRANULOCYTES # BLD AUTO: 0.03 THOUSAND/UL (ref 0–0.2)
IMM GRANULOCYTES NFR BLD AUTO: 0 % (ref 0–2)
LDLC SERPL CALC-MCNC: 144 MG/DL (ref 0–100)
LYMPHOCYTES # BLD AUTO: 2.15 THOUSANDS/ÂΜL (ref 0.6–4.47)
LYMPHOCYTES NFR BLD AUTO: 30 % (ref 14–44)
MCH RBC QN AUTO: 33.1 PG (ref 26.8–34.3)
MCHC RBC AUTO-ENTMCNC: 33.6 G/DL (ref 31.4–37.4)
MCV RBC AUTO: 99 FL (ref 82–98)
MONOCYTES # BLD AUTO: 0.56 THOUSAND/ÂΜL (ref 0.17–1.22)
MONOCYTES NFR BLD AUTO: 8 % (ref 4–12)
NEUTROPHILS # BLD AUTO: 4.17 THOUSANDS/ÂΜL (ref 1.85–7.62)
NEUTS SEG NFR BLD AUTO: 60 % (ref 43–75)
NONHDLC SERPL-MCNC: 164 MG/DL
NRBC BLD AUTO-RTO: 0 /100 WBCS
PLATELET # BLD AUTO: 289 THOUSANDS/UL (ref 149–390)
PMV BLD AUTO: 9.7 FL (ref 8.9–12.7)
POTASSIUM SERPL-SCNC: 4.3 MMOL/L (ref 3.5–5.3)
PROT SERPL-MCNC: 7.2 G/DL (ref 6.4–8.4)
RBC # BLD AUTO: 3.99 MILLION/UL (ref 3.81–5.12)
SODIUM SERPL-SCNC: 138 MMOL/L (ref 135–147)
TRIGL SERPL-MCNC: 101 MG/DL (ref ?–150)
TSH SERPL DL<=0.05 MIU/L-ACNC: 1.29 UIU/ML (ref 0.45–4.5)
WBC # BLD AUTO: 7.08 THOUSAND/UL (ref 4.31–10.16)

## 2025-06-09 PROCEDURE — 36415 COLL VENOUS BLD VENIPUNCTURE: CPT

## 2025-06-09 PROCEDURE — 80053 COMPREHEN METABOLIC PANEL: CPT

## 2025-06-09 PROCEDURE — 85025 COMPLETE CBC W/AUTO DIFF WBC: CPT

## 2025-06-09 PROCEDURE — 84443 ASSAY THYROID STIM HORMONE: CPT

## 2025-06-09 PROCEDURE — 80061 LIPID PANEL: CPT

## 2025-06-09 PROCEDURE — 82306 VITAMIN D 25 HYDROXY: CPT

## 2025-06-17 ENCOUNTER — OFFICE VISIT (OUTPATIENT)
Dept: FAMILY MEDICINE CLINIC | Facility: CLINIC | Age: 80
End: 2025-06-17
Payer: MEDICARE

## 2025-06-17 VITALS
WEIGHT: 150 LBS | DIASTOLIC BLOOD PRESSURE: 72 MMHG | HEART RATE: 76 BPM | OXYGEN SATURATION: 97 % | RESPIRATION RATE: 16 BRPM | SYSTOLIC BLOOD PRESSURE: 118 MMHG | BODY MASS INDEX: 24.99 KG/M2 | HEIGHT: 65 IN

## 2025-06-17 DIAGNOSIS — E55.9 VITAMIN D DEFICIENCY: ICD-10-CM

## 2025-06-17 DIAGNOSIS — I10 ESSENTIAL HYPERTENSION: ICD-10-CM

## 2025-06-17 DIAGNOSIS — G57.91 NEUROPATHY OF RIGHT FOOT: ICD-10-CM

## 2025-06-17 DIAGNOSIS — I10 BENIGN ESSENTIAL HYPERTENSION: ICD-10-CM

## 2025-06-17 DIAGNOSIS — H61.23 BILATERAL IMPACTED CERUMEN: ICD-10-CM

## 2025-06-17 DIAGNOSIS — M50.120 CERVICAL DISC DISORDER WITH RADICULOPATHY OF MID-CERVICAL REGION: ICD-10-CM

## 2025-06-17 DIAGNOSIS — Z00.00 MEDICARE ANNUAL WELLNESS VISIT, SUBSEQUENT: Primary | ICD-10-CM

## 2025-06-17 DIAGNOSIS — E78.2 MIXED HYPERLIPIDEMIA: ICD-10-CM

## 2025-06-17 DIAGNOSIS — Z23 ENCOUNTER FOR IMMUNIZATION: ICD-10-CM

## 2025-06-17 PROCEDURE — G0009 ADMIN PNEUMOCOCCAL VACCINE: HCPCS | Performed by: FAMILY MEDICINE

## 2025-06-17 PROCEDURE — 90677 PCV20 VACCINE IM: CPT | Performed by: FAMILY MEDICINE

## 2025-06-17 PROCEDURE — G0439 PPPS, SUBSEQ VISIT: HCPCS | Performed by: FAMILY MEDICINE

## 2025-06-17 PROCEDURE — G2211 COMPLEX E/M VISIT ADD ON: HCPCS | Performed by: FAMILY MEDICINE

## 2025-06-17 PROCEDURE — 99214 OFFICE O/P EST MOD 30 MIN: CPT | Performed by: FAMILY MEDICINE

## 2025-06-17 RX ORDER — ROSUVASTATIN CALCIUM 20 MG/1
20 TABLET, COATED ORAL DAILY
Qty: 90 TABLET | Refills: 1 | Status: SHIPPED | OUTPATIENT
Start: 2025-06-17

## 2025-06-17 RX ORDER — LISINOPRIL AND HYDROCHLOROTHIAZIDE 20; 25 MG/1; MG/1
1 TABLET ORAL DAILY
Qty: 90 TABLET | Refills: 1 | Status: SHIPPED | OUTPATIENT
Start: 2025-06-17

## 2025-06-17 NOTE — PROGRESS NOTES
Assessment and Plan:     Problem List Items Addressed This Visit        Cardiovascular and Mediastinum    Benign essential hypertension    Hypertension remains very well-controlled on lisinopril/hydrochlorothiazide.  Continue same.  Refill provided.  Reevaluate in 6 months         Relevant Medications    lisinopril-hydrochlorothiazide (PRINZIDE,ZESTORETIC) 20-25 MG per tablet    Other Relevant Orders    TSH, 3rd generation with Free T4 reflex       Nervous and Auditory    Bilateral impacted cerumen    Removed as per procedure note         Cervical disc disorder with radiculopathy of mid-cervical region    Patient does have longstanding history of spinal stenosis.  She has been seen by pain management in the past.  I did provide the telephone number for her to contact pain management.  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  Placed her back onto Lyrica.  Lyrica was filled according to drug monitoring website.  She needs to make certain that she still has the medication            Other    Medicare annual wellness visit, subsequent - Primary    Subsequent annual wellness visit completed    Prevnar 20 provided in the office         Mixed hyperlipidemia    Cholesterol is significantly worse likely related to dietary discretion.  Advised the patient that I can either change her statin therapy or she can cut back on her ice cream and chocolate.  Patient would like me to change her statin therapy.  Switch to rosuvastatin 20 mg once daily.  Repeat lipid panel to be done in 6 months         Relevant Medications    rosuvastatin (CRESTOR) 20 MG tablet    Other Relevant Orders    Comprehensive metabolic panel    Lipid panel    Vitamin D deficiency    Continue on vitamin D supplementation.  Repeat vitamin D level to be done in 6 months         Relevant Orders    Vitamin D 25 hydroxy   Other  Visit Diagnoses       Encounter for immunization        Relevant Orders    Pneumococcal Conjugate Vaccine 20-valent (Pcv20) (Completed)      Essential hypertension        Relevant Medications    lisinopril-hydrochlorothiazide (PRINZIDE,ZESTORETIC) 20-25 MG per tablet    Other Relevant Orders    CBC and differential          BMI Counseling: Body mass index is 24.96 kg/m². The BMI is above normal. Nutrition recommendations include reducing intake of saturated and trans fat and reducing intake of cholesterol. Rationale for BMI follow-up plan is due to patient being overweight or obese.     Depression Screening and Follow-up Plan: Patient was screened for depression during today's encounter. They screened negative with a PHQ-2 score of 0.        Preventive health issues were discussed with patient, and age appropriate screening tests were ordered as noted in patient's After Visit Summary.  Personalized health advice and appropriate referrals for health education or preventive services given if needed, as noted in patient's After Visit Summary.     History of Present Illness:     Patient presents for Medicare Annual Wellness visit    Patient Care Team:  Richi Guzman DO as PCP - General  MD Anna Bashir MD as Endoscopist  ADRIENNE Pardo MD Alison Benedetto, [place] (Chiropractic Medicine)     Problem List:     Patient Active Problem List   Diagnosis   • Benign essential hypertension   • Benign neoplasm of bone   • Disc degeneration, lumbar   • Facial nerve disorder   • Generalized osteoarthritis   • Mixed hyperlipidemia   • Symptomatic menopausal or female climacteric states   • Neuropathy of right foot   • Polyp of sigmoid colon   • Primary osteoarthritis of right knee   • Vitamin D deficiency   • Flu vaccine need   • Medicare annual wellness visit, subsequent   • 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   • Exposure to COVID-19 virus   • Encounter for screening mammogram for  breast cancer   • Cervical disc disorder with radiculopathy of mid-cervical region   • Cervical spinal stenosis   • Postmenopausal   • Bilateral impacted cerumen      Past Medical and Surgical History:     Past Medical History:   Diagnosis Date   • Arthritis    • 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, unspecified     Last assessed: 10/17/13   • Colon polyps    • Depression    • Hyperlipidemia    • Hypertension    • Lumbago     last assessed: 1/15/14   • Menopause    • Neuropathy    • Ptosis, left eyelid     last assessed: 10/16/15   • Shortness of breath    • Vertigo     last assessed: 10/26/15     Past Surgical History:   Procedure Laterality Date   • BLADDER SURGERY     • HYSTERECTOMY      still has ovaries; about age 66   • ID COLONOSCOPY FLX DX W/COLLJ SPEC WHEN PFRMD N/A 04/11/2016    Procedure: COLONOSCOPY;  Surgeon: Anna Valencia MD;  Location: [facility];  Service: Gastroenterology      Family History:     Family History   Problem Relation Name Age of Onset   • No Known Problems Mother     • Throat cancer Father     • Lung cancer Father     • No Known Problems Sister     • No Known Problems Daughter     • No Known Problems Daughter     • No Known Problems Daughter     • No Known Problems Daughter     • No Known Problems Maternal Grandmother     • No Known Problems Maternal Grandfather     • No Known Problems Paternal Grandmother     • Cancer Paternal Grandfather          behind the eye   • Heart disease Other Grandparent         cardiac disorder   • No Known Problems Brother     • No Known Problems Maternal Aunt     • No Known Problems Maternal Uncle     • No Known Problems Paternal Aunt     • No Known Problems Paternal Aunt     • No Known Problems Paternal Aunt     • No Known Problems Paternal Aunt     • Breast cancer Paternal Cousin          unsure of age      Social History:     Social History     Socioeconomic History   • Marital status:    Tobacco Use   • Smoking status: Never   •  Smokeless tobacco: Never   Vaping Use   • Vaping status: Never Used   Substance and Sexual Activity   • Alcohol use: Yes     Comment: social, rarely   • Drug use: No   • Sexual activity: Not Currently   Social History Narrative    Caffeine use    , per [employer]     Social Drivers of Health     Financial Resource Strain: Low Risk  (5/24/2023)    Overall Financial Resource Strain (CARDIA)    • Difficulty of Paying Living Expenses: Not very hard   Food Insecurity: No Food Insecurity (6/17/2025)    Nursing - Inadequate Food Risk Classification    • Worried About Running Out of Food in the Last Year: Never true    • Ran Out of Food in the Last Year: Never true   Transportation Needs: No Transportation Needs (6/17/2025)    PRAPARE - Transportation    • Lack of Transportation (Medical): No    • Lack of Transportation (Non-Medical): No   Housing Stability: Low Risk  (6/17/2025)    Housing Stability Vital Sign    • Unable to Pay for Housing in the Last Year: No    • Number of Times Moved in the Last Year: 0    • Homeless in the Last Year: No       Medications and Allergies:     Current Outpatient Medications   Medication Sig Dispense Refill   • acetaminophen (TYLENOL) 500 mg tablet Take 500 mg by mouth every 6 (six) hours as needed for mild pain     • Cholecalciferol (VITAMIN D3) 5000 units CAPS Take 2,000 Units by mouth every other day     • ibuprofen (MOTRIN) 600 mg tablet Take 1 tablet (600 mg total) by mouth 3 (three) times a day as needed for mild pain 90 tablet 2   • lisinopril-hydrochlorothiazide (PRINZIDE,ZESTORETIC) 20-25 MG per tablet Take 1 tablet by mouth daily 90 tablet 1   • pregabalin (LYRICA) 25 mg capsule Take 1 capsule (25 mg total) by mouth 2 (two) times a day 60 capsule 1   • rosuvastatin (CRESTOR) 20 MG tablet Take 1 tablet (20 mg total) by mouth daily 90 tablet 1     No current facility-administered medications for this visit.     Allergies   Allergen Reactions   • Benzoyl Peroxide Rash       "Immunizations:     Immunization History   Administered Date(s) Administered   • COVID-19 MODERNA VACC 0.5 ML IM 01/13/2021, 02/11/2021, 12/06/2021, 11/11/2022   • COVID-19 Moderna mRNA Vaccine 12 Yr+ 50 mcg/0.5 mL (Spikevax) 10/07/2024   • COVID-19 Pfizer mRNA vacc PF dannielle-sucrose 12 yr and older (Comirnaty) 11/14/2023   • COVID-19, unspecified 01/13/2021   • INFLUENZA 11/12/2022   • Influenza Split High Dose Preservative Free IM 09/24/2013, 10/08/2014, 10/26/2015, 12/01/2016, 12/12/2017, 12/09/2024   • Influenza, high dose seasonal 0.7 mL 12/13/2018, 12/16/2019, 10/19/2020, 11/01/2021, 01/12/2024   • Pneumococcal Conjugate 13-Valent 05/02/2016   • Pneumococcal Conjugate PCV 7 05/09/2011   • Pneumococcal Conjugate Vaccine 20-valent (Pcv20), Polysace 06/17/2025   • Pneumococcal Polysaccharide PPV23 05/09/2011      Health Maintenance:         Topic Date Due   • Hepatitis C Screening  06/09/2084 (Originally 1945)   • Breast Cancer Screening: Mammogram  10/01/2025   • Colorectal Cancer Screening  Discontinued     There are no preventive care reminders to display for this patient.   Medicare Health Risk Assessment:     /72   Pulse 76   Resp 16   Ht 5' 5\" (1.651 m)   Wt 68 kg (150 lb)   SpO2 97%   BMI 24.96 kg/m²      Laura is here for her Subsequent Wellness visit. Last Medicare Wellness visit information reviewed, patient interviewed, no change since last AWV.     Health Risk Assessment:   Patient rates overall health as good. Patient feels that their physical health rating is slightly better. Patient is satisfied with their life. Eyesight was rated as slightly worse. Hearing was rated as same. Patient feels that their emotional and mental health rating is slightly better. Patients states they are never, rarely angry. Patient states they are sometimes unusually tired/fatigued. Pain experienced in the last 7 days has been some. Patient's pain rating has been 3/10. Patient states that she has experienced " no weight loss or gain in last 6 months.     Depression Screening:   PHQ-2 Score: 0      Fall Risk Screening:   In the past year, patient has experienced: no history of falling in past year      Urinary Incontinence Screening:   Patient has not leaked urine accidently in the last six months.     Home Safety:  Patient does not have trouble with stairs inside or outside of their home. Patient has working smoke alarms and has working carbon monoxide detector. Home safety hazards include: none.     Nutrition:   Current diet is Regular.     Medications:   Patient is currently taking over-the-counter supplements. OTC medications include: see medication list. Patient is able to manage medications.     Activities of Daily Living (ADLs)/Instrumental Activities of Daily Living (IADLs):   Walk and transfer into and out of bed and chair?: Yes  Dress and groom yourself?: Yes    Bathe or shower yourself?: Yes    Feed yourself? Yes  Do your laundry/housekeeping?: Yes  Manage your money, pay your bills and track your expenses?: Yes  Make your own meals?: Yes    Do your own shopping?: Yes    Previous Hospitalizations:   Any hospitalizations or ED visits within the last 12 months?: No      Advance Care Planning:   Living will: Yes    Durable POA for healthcare: Yes    Advanced directive: Yes    Advanced directive counseling given: No    Five wishes given: No    Patient declined ACP directive: No    End of Life Decisions reviewed with patient: Yes    Provider agrees with end of life decisions: Yes      Preventive Screenings      Cardiovascular Screening:    General: Screening Not Indicated and History Lipid Disorder      Diabetes Screening:     General: Screening Current      Colorectal Cancer Screening:     General: Screening Current      Breast Cancer Screening:     General: Screening Current    Due for: Mammogram        Cervical Cancer Screening:    General: Screening Not Indicated      Osteoporosis Screening:    General: Screening  Current      Abdominal Aortic Aneurysm (AAA) Screening:        General: Screening Not Indicated      Lung Cancer Screening:     General: Screening Not Indicated      Hepatitis C Screening:    General: Screening Current    Immunizations:  - Immunizations due: Zoster (Shingrix)    Screening, Brief Intervention, and Referral to Treatment (SBIRT)     Screening  Typical number of drinks in a day: 0  Typical number of drinks in a week: 0  Interpretation: Low risk drinking behavior.    AUDIT-C Screenin) How often did you have a drink containing alcohol in the past year? monthly or less  2) How many drinks did you have on a typical day when you were drinking in the past year? 1 to 2  3) How often did you have 6 or more drinks on one occasion in the past year? never    AUDIT-C Score: 1  Interpretation: Score 0-2 (female): Negative screen for alcohol misuse    Single Item Drug Screening:  How often have you used an illegal drug (including marijuana) or a prescription medication for non-medical reasons in the past year? never    Single Item Drug Screen Score: 0  Interpretation: Negative screen for possible drug use disorder      Richi Guzman,     79-year-old female presents for subsequent annual wellness visit and follow-up of chronic conditions including hyperlipidemia vitamin D deficiency, hypertension and cervical spinal stenosis.  Patient overall has been feeling well.  Still has pain in her left shoulder ever since her fall back in .  I did contact pain management back in December.  They had called and left a message on her cell phone but she states that she never received the message.  Lyrica was filled at [facility] pharmacy but she is not actively been taking the medication.  Cholesterol has increased significantly which the patient attributes to dietary indiscretion with ice cream and chocolates.        Review of Systems   Constitutional: Negative.    HENT: Negative.     Eyes: Negative.  Visual disturbance:  Blind left eye.   Respiratory: Negative.     Cardiovascular: Negative.    Gastrointestinal: Negative.    Endocrine: Negative.    Genitourinary: Negative.    Musculoskeletal:  Positive for neck pain. Arthralgias: Right knee pain.  Skin: Negative.    Allergic/Immunologic: Negative.    Neurological: Negative.    Hematological: Negative.    Psychiatric/Behavioral: Negative.         Physical Exam  Vitals and nursing note reviewed.   Constitutional:       General: She is not in acute distress.     Appearance: Normal appearance. She is well-developed and normal weight.   HENT:      Head: Normocephalic and atraumatic.      Right Ear: There is impacted cerumen.      Left Ear: There is impacted cerumen.     Eyes:      Conjunctiva/sclera: Conjunctivae normal.      Pupils: Pupils are equal, round, and reactive to light.       Cardiovascular:      Rate and Rhythm: Normal rate and regular rhythm.      Heart sounds: No murmur heard.  Pulmonary:      Effort: Pulmonary effort is normal. No respiratory distress.      Breath sounds: Normal breath sounds.   Abdominal:      General: Bowel sounds are normal.      Palpations: Abdomen is soft.      Tenderness: There is no abdominal tenderness.     Musculoskeletal:         General: No swelling.      Cervical back: Normal range of motion and neck supple.     Skin:     General: Skin is warm and dry.      Capillary Refill: Capillary refill takes less than 2 seconds.     Neurological:      Mental Status: She is alert and oriented to person, place, and time.      Deep Tendon Reflexes: Reflexes are normal and symmetric.      Comments:   Very mild ptosis of the left eyelid   Psychiatric:         Mood and Affect: Mood normal.         Behavior: Behavior normal.         Thought Content: Thought content normal.         Judgment: Judgment normal.       Recent Results (from the past 8 weeks)   CBC and differential    Collection Time: 06/09/25 11:39 AM   Result Value Ref Range    WBC 7.08 4.31 - 10.16  Thousand/uL    RBC 3.99 3.81 - 5.12 Million/uL    Hemoglobin 13.2 11.5 - 15.4 g/dL    Hematocrit 39.3 34.8 - 46.1 %    MCV 99 (H) 82 - 98 fL    MCH 33.1 26.8 - 34.3 pg    MCHC 33.6 31.4 - 37.4 g/dL    RDW 12.6 11.6 - 15.1 %    MPV 9.7 8.9 - 12.7 fL    Platelets 289 149 - 390 Thousands/uL    nRBC 0 /100 WBCs    Segmented % 60 43 - 75 %    Immature Grans % 0 0 - 2 %    Lymphocytes % 30 14 - 44 %    Monocytes % 8 4 - 12 %    Eosinophils Relative 1 0 - 6 %    Basophils Relative 1 0 - 1 %    Absolute Neutrophils 4.17 1.85 - 7.62 Thousands/µL    Absolute Immature Grans 0.03 0.00 - 0.20 Thousand/uL    Absolute Lymphocytes 2.15 0.60 - 4.47 Thousands/µL    Absolute Monocytes 0.56 0.17 - 1.22 Thousand/µL    Eosinophils Absolute 0.10 0.00 - 0.61 Thousand/µL    Basophils Absolute 0.07 0.00 - 0.10 Thousands/µL   Comprehensive metabolic panel    Collection Time: 06/09/25 11:39 AM   Result Value Ref Range    Sodium 138 135 - 147 mmol/L    Potassium 4.3 3.5 - 5.3 mmol/L    Chloride 100 96 - 108 mmol/L    CO2 32 21 - 32 mmol/L    ANION GAP 6 4 - 13 mmol/L    BUN 15 5 - 25 mg/dL    Creatinine 0.89 0.60 - 1.30 mg/dL    Glucose, Fasting 97 65 - 99 mg/dL    Calcium 9.8 8.4 - 10.2 mg/dL    AST 15 13 - 39 U/L    ALT 8 7 - 52 U/L    Alkaline Phosphatase 47 34 - 104 U/L    Total Protein 7.2 6.4 - 8.4 g/dL    Albumin 4.5 3.5 - 5.0 g/dL    Total Bilirubin 0.50 0.20 - 1.00 mg/dL    eGFR 61 ml/min/1.73sq m   Lipid panel    Collection Time: 06/09/25 11:39 AM   Result Value Ref Range    Cholesterol 241 (H) See Comment mg/dL    Triglycerides 101 See Comment mg/dL    HDL, Direct 77 >=50 mg/dL    LDL Calculated 144 (H) 0 - 100 mg/dL    Non-HDL-Chol (CHOL-HDL) 164 mg/dl   TSH, 3rd generation with Free T4 reflex    Collection Time: 06/09/25 11:39 AM   Result Value Ref Range    TSH 3RD GENERATION 1.291 0.450 - 4.500 uIU/mL   Vitamin D 25 hydroxy    Collection Time: 06/09/25 11:39 AM   Result Value Ref Range    Vit D, 25-Hydroxy 35.9 30.0 - 100.0 ng/mL

## 2025-06-17 NOTE — ASSESSMENT & PLAN NOTE
Patient does have longstanding history of spinal stenosis.  She has been seen by pain management in the past.  I did provide the telephone number for her to contact pain management.  Also advised her to work in her medicine cabinet to see if she still has Lyrica

## 2025-06-17 NOTE — PATIENT INSTRUCTIONS
Medicare Preventive Visit Patient Instructions  Thank you for completing your Welcome to Medicare Visit or Medicare Annual Wellness Visit today. Your next wellness visit will be due in one year (6/18/2026).  The screening/preventive services that you may require over the next 5-10 years are detailed below. Some tests may not apply to you based off risk factors and/or age. Screening tests ordered at today's visit but not completed yet may show as past due. Also, please note that scanned in results may not display below.  Preventive Screenings:  Service Recommendations Previous Testing/Comments   Colorectal Cancer Screening  * Colonoscopy    * Fecal Occult Blood Test (FOBT)/Fecal Immunochemical Test (FIT)  * Fecal DNA/Cologuard Test  * Flexible Sigmoidoscopy Age: 45-75 years old   Colonoscopy: every 10 years (may be performed more frequently if at higher risk)  OR  FOBT/FIT: every 1 year  OR  Cologuard: every 3 years  OR  Sigmoidoscopy: every 5 years  Screening may be recommended earlier than age 45 if at higher risk for colorectal cancer. Also, an individualized decision between you and your healthcare provider will decide whether screening between the ages of 76-85 would be appropriate. Colonoscopy: 06/21/2023  FOBT/FIT: Not on file  Cologuard: 01/19/2023  Sigmoidoscopy: Not on file    Screening Current     Breast Cancer Screening Age: 40+ years old  Frequency: every 1-2 years  Not required if history of left and right mastectomy Mammogram: 10/01/2024    Screening Current  Due for Mammogram   Cervical Cancer Screening Between the ages of 21-29, pap smear recommended once every 3 years.   Between the ages of 30-65, can perform pap smear with HPV co-testing every 5 years.   Recommendations may differ for women with a history of total hysterectomy, cervical cancer, or abnormal pap smears in past. Pap Smear: Not on file    Screening Not Indicated   Hepatitis C Screening Once for adults born between 1945 and 1965  More  frequently in patients at high risk for Hepatitis C Hep C Antibody: Not on file    Screening Current   Diabetes Screening 1-2 times per year if you're at risk for diabetes or have pre-diabetes Fasting glucose: 97 mg/dL (6/9/2025)  A1C: No results in last 5 years (No results in last 5 years)  Screening Current   Cholesterol Screening Once every 5 years if you don't have a lipid disorder. May order more often based on risk factors. Lipid panel: 06/09/2025    Screening Not Indicated  History Lipid Disorder     Other Preventive Screenings Covered by Medicare:  Abdominal Aortic Aneurysm (AAA) Screening: covered once if your at risk. You're considered to be at risk if you have a family history of AAA.  Lung Cancer Screening: covers low dose CT scan once per year if you meet all of the following conditions: (1) Age 55-77; (2) No signs or symptoms of lung cancer; (3) Current smoker or have quit smoking within the last 15 years; (4) You have a tobacco smoking history of at least 20 pack years (packs per day multiplied by number of years you smoked); (5) You get a written order from a healthcare provider.  Glaucoma Screening: covered annually if you're considered high risk: (1) You have diabetes OR (2) Family history of glaucoma OR (3)  aged 50 and older OR (4)  American aged 65 and older  Osteoporosis Screening: covered every 2 years if you meet one of the following conditions: (1) You're estrogen deficient and at risk for osteoporosis based off medical history and other findings; (2) Have a vertebral abnormality; (3) On glucocorticoid therapy for more than 3 months; (4) Have primary hyperparathyroidism; (5) On osteoporosis medications and need to assess response to drug therapy.   Last bone density test (DXA Scan): 10/01/2024.  HIV Screening: covered annually if you're between the age of 15-65. Also covered annually if you are younger than 15 and older than 65 with risk factors for HIV infection. For  pregnant patients, it is covered up to 3 times per pregnancy.    Immunizations:  Immunization Recommendations   Influenza Vaccine Annual influenza vaccination during flu season is recommended for all persons aged >= 6 months who do not have contraindications   Pneumococcal Vaccine   * Pneumococcal conjugate vaccine = PCV13 (Prevnar 13), PCV15 (Vaxneuvance), PCV20 (Prevnar 20)  * Pneumococcal polysaccharide vaccine = PPSV23 (Pneumovax) Adults 19-63 yo with certain risk factors or if 65+ yo  If never received any pneumonia vaccine: recommend Prevnar 20 (PCV20)  Give PCV20 if previously received 1 dose of PCV13 or PPSV23   Hepatitis B Vaccine 3 dose series if at intermediate or high risk (ex: diabetes, end stage renal disease, liver disease)   Respiratory syncytial virus (RSV) Vaccine - COVERED BY MEDICARE PART D  * RSVPreF3 (Arexvy) CDC recommends that adults 60 years of age and older may receive a single dose of RSV vaccine using shared clinical decision-making (SCDM)   Tetanus (Td) Vaccine - COST NOT COVERED BY MEDICARE PART B Following completion of primary series, a booster dose should be given every 10 years to maintain immunity against tetanus. Td may also be given as tetanus wound prophylaxis.   Tdap Vaccine - COST NOT COVERED BY MEDICARE PART B Recommended at least once for all adults. For pregnant patients, recommended with each pregnancy.   Shingles Vaccine (Shingrix) - COST NOT COVERED BY MEDICARE PART B  2 shot series recommended in those 19 years and older who have or will have weakened immune systems or those 50 years and older     Health Maintenance Due:      Topic Date Due   • Hepatitis C Screening  06/09/2084 (Originally 1945)   • Breast Cancer Screening: Mammogram  10/01/2025   • Colorectal Cancer Screening  Discontinued     Immunizations Due:  There are no preventive care reminders to display for this patient.  Advance Directives   What are advance directives?  Advance directives are legal  documents that state your wishes and plans for medical care. These plans are made ahead of time in case you lose your ability to make decisions for yourself. Advance directives can apply to any medical decision, such as the treatments you want, and if you want to donate organs.   What are the types of advance directives?  There are many types of advance directives, and each state has rules about how to use them. You may choose a combination of any of the following:  Living will:  This is a written record of the treatment you want. You can also choose which treatments you do not want, which to limit, and which to stop at a certain time. This includes surgery, medicine, IV fluid, and tube feedings.   Durable power of  for healthcare (DPAHC):  This is a written record that states who you want to make healthcare choices for you when you are unable to make them for yourself. This person, called a proxy, is usually a family member or a friend. You may choose more than 1 proxy.  Do not resuscitate (DNR) order:  A DNR order is used in case your heart stops beating or you stop breathing. It is a request not to have certain forms of treatment, such as CPR. A DNR order may be included in other types of advance directives.  Medical directive:  This covers the care that you want if you are in a coma, near death, or unable to make decisions for yourself. You can list the treatments you want for each condition. Treatment may include pain medicine, surgery, blood transfusions, dialysis, IV or tube feedings, and a ventilator (breathing machine).  Values history:  This document has questions about your views, beliefs, and how you feel and think about life. This information can help others choose the care that you would choose.  Why are advance directives important?  An advance directive helps you control your care. Although spoken wishes may be used, it is better to have your wishes written down. Spoken wishes can be  misunderstood, or not followed. Treatments may be given even if you do not want them. An advance directive may make it easier for your family to make difficult choices about your care.       © Copyright [employer] 2018 Information is for End User's use only and may not be sold, redistributed or otherwise used for commercial purposes. All illustrations and images included in CareNotes® are the copyrighted property of [employer] or [employer]

## 2025-06-17 NOTE — ASSESSMENT & PLAN NOTE
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  Placed her back onto Lyrica.  Lyrica was filled according to drug monitoring website.  She needs to make certain that she still has the medication

## 2025-06-17 NOTE — ASSESSMENT & PLAN NOTE
Cholesterol is significantly worse likely related to dietary discretion.  Advised the patient that I can either change her statin therapy or she can cut back on her ice cream and chocolate.  Patient would like me to change her statin therapy.  Switch to rosuvastatin 20 mg once daily.  Repeat lipid panel to be done in 6 months

## 2025-07-17 PROBLEM — H61.23 BILATERAL IMPACTED CERUMEN: Status: RESOLVED | Noted: 2025-06-17 | Resolved: 2025-07-17

## 2025-07-17 PROBLEM — Z00.00 MEDICARE ANNUAL WELLNESS VISIT, SUBSEQUENT: Status: RESOLVED | Noted: 2018-12-13 | Resolved: 2025-07-17
